# Patient Record
Sex: FEMALE | Race: WHITE | NOT HISPANIC OR LATINO | Employment: FULL TIME | URBAN - METROPOLITAN AREA
[De-identification: names, ages, dates, MRNs, and addresses within clinical notes are randomized per-mention and may not be internally consistent; named-entity substitution may affect disease eponyms.]

---

## 2018-07-20 ENCOUNTER — OFFICE VISIT (OUTPATIENT)
Dept: CARDIOLOGY | Facility: CLINIC | Age: 33
End: 2018-07-20
Payer: COMMERCIAL

## 2018-07-20 ENCOUNTER — OFFICE VISIT (OUTPATIENT)
Dept: ENDOCRINOLOGY | Facility: CLINIC | Age: 33
End: 2018-07-20
Payer: COMMERCIAL

## 2018-07-20 VITALS
DIASTOLIC BLOOD PRESSURE: 88 MMHG | WEIGHT: 172.63 LBS | HEART RATE: 69 BPM | BODY MASS INDEX: 30.59 KG/M2 | SYSTOLIC BLOOD PRESSURE: 128 MMHG | HEIGHT: 63 IN

## 2018-07-20 VITALS
HEIGHT: 64 IN | WEIGHT: 172.63 LBS | DIASTOLIC BLOOD PRESSURE: 88 MMHG | BODY MASS INDEX: 29.47 KG/M2 | HEART RATE: 70 BPM | SYSTOLIC BLOOD PRESSURE: 126 MMHG

## 2018-07-20 DIAGNOSIS — L70.9 ACNE, UNSPECIFIED ACNE TYPE: ICD-10-CM

## 2018-07-20 DIAGNOSIS — E28.2 PCOS (POLYCYSTIC OVARIAN SYNDROME): Primary | ICD-10-CM

## 2018-07-20 DIAGNOSIS — R00.2 FLUTTERING SENSATION OF HEART: ICD-10-CM

## 2018-07-20 DIAGNOSIS — R07.9 CHEST PAIN, UNSPECIFIED TYPE: ICD-10-CM

## 2018-07-20 DIAGNOSIS — L68.0 HIRSUTISM: ICD-10-CM

## 2018-07-20 DIAGNOSIS — I49.49 PREMATURE BEATS: Primary | ICD-10-CM

## 2018-07-20 PROCEDURE — 99204 OFFICE O/P NEW MOD 45 MIN: CPT | Mod: S$GLB,,, | Performed by: INTERNAL MEDICINE

## 2018-07-20 PROCEDURE — 3008F BODY MASS INDEX DOCD: CPT | Mod: CPTII,S$GLB,, | Performed by: INTERNAL MEDICINE

## 2018-07-20 PROCEDURE — 99999 PR PBB SHADOW E&M-EST. PATIENT-LVL III: CPT | Mod: PBBFAC,,, | Performed by: INTERNAL MEDICINE

## 2018-07-20 PROCEDURE — 93000 ELECTROCARDIOGRAM COMPLETE: CPT | Mod: S$GLB,,, | Performed by: INTERNAL MEDICINE

## 2018-07-20 NOTE — LETTER
July 29, 2018      PIERO Dodson Jr., MD  [address]  [facility] [address]           [facility]  [address] [facility] [address] [place] [address]  Phone: [phone]  Fax: [phone]          Patient: Lacey Jenkins Mondor   MR Number: [id number]   YOB: 1985   Date of Visit: 7/20/2018       Dear Dr. PIERO Dodson Jr.:    Thank you for referring Lacey Mondor to me for evaluation. Attached you will find relevant portions of my assessment and plan of care.    If you have questions, please do not hesitate to call me. I look forward to following Lacey Mondor along with you.    Sincerely,    Messi Dickson, DO [employer]  CC:  No Recipients    If you would like to receive this communication electronically, please contact [email or web address] or [phone] to request more information on [employer] access.    For providers and/or their staff who would like to refer a patient to [facility], please contact us through our one-stop-shop provider referral line, [facility], at [phone].    If you feel you have received this communication in error or would no longer like to receive these types of communications, please e-mail [email or web address]

## 2018-07-20 NOTE — PROGRESS NOTES
CHIEF COMPLAINT: PCOS  33 year old being seen as a new patient. Diagnosed with PCOS in teens but does not recall age. Has cystic acne. She delivered 9 months ago and still breast feeding. No menstrual cycle. Has not seen derm for acne. Has hirsutism on chin- tweezes. No history of metformin. She is seeing OB.       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 Possible hypothyroidism. + Dm 2.     MEDICATIONS/ALLERGIES: The patient's MedCard has been updated and reviewed.      ROS:   Constitutional: No recent significant weight change  Eyes: No recent visual changes  ENT: No dysphagia  Cardiovascular: Denies current anginal symptoms  Respiratory: Denies current respiratory difficulty  Gastrointestinal: Denies recent bowel disturbances  GenitoUrinary - No dysuria  Skin: No new skin rash  Neurologic: No focal neurologic complaints  Remainder ROS negative        PE:    GENERAL: Well developed, well nourished.  PSYCH:  appropriate mood and affect  EYES:  PERRL, EOM intact.  ENT: Nares patent, oropharynx clear, mucosa pink,   NECK: Supple, trachea midline, no palpable thyroid nodules.   CHEST: Resp even and unlabored, CTA bilateral.  CARDIAC: RRR, S1, S2 heard, no murmurs, rubs, S3, or S4  ABDOMEN: Soft, non-tender, non-distended;  No organomegaly  VASCULAR:  DP pulses +2/4 bilaterally, no edema  NEURO: Gait steady, CN II-VII grossly intact  SKIN: No areas of breakdown, no acanthosis nigracans.    LABS     ASSESSMENT/PLAN:  1. PCOS  2. Hirsutism  3. Acne    PLAN:  Will do w/u as seen below. If fertility a consideration in the near future can try metformin depending on the insulin levels. Currently breast feeding.       FOLLOWUP  Fasting and 8 AM- Insulin. CMP, 17 hydroprogesterone. Testosterone, DHEAS, ACTH, Cortisol, TSH

## 2018-07-20 NOTE — PROGRESS NOTES
Subjective:    Patient ID:  Lacey Jenkins Mondor is a 33 y.o. female who presents for evaluation of Palpitations      Pt has been experiencing a fluttering in her chest which is described as a single beat and will recur throughout the day. It started during her last pregnancy. Occasionally she will feel like her heart races for a few seconds. She denies any dizziness, syncope or pre-syncope. She denies any sustained rhythms. She denies any chest pain, SOB, PND, orthopnea.        Review of Systems   Constitution: Negative for weight gain and weight loss.   HENT: Negative.    Eyes: Negative.    Cardiovascular: Positive for irregular heartbeat and palpitations. Negative for chest pain, claudication, cyanosis, dyspnea on exertion, leg swelling, near-syncope, orthopnea (no PND) and syncope.   Respiratory: Negative for cough, hemoptysis, shortness of breath and snoring.    Endocrine: Negative.    Skin: Negative.    Musculoskeletal: Negative for joint pain, muscle cramps, muscle weakness and myalgias.   Gastrointestinal: Negative for diarrhea, hematemesis, nausea and vomiting.   Genitourinary: Negative.    Neurological: Negative for dizziness, focal weakness, light-headedness, loss of balance, numbness, paresthesias and seizures.   Psychiatric/Behavioral: Negative.         Objective:    Physical Exam   Constitutional: She is oriented to person, place, and time. She appears well-developed and well-nourished.   Eyes: Pupils are equal, round, and reactive to light.   Neck: Normal range of motion. No thyromegaly present.   Cardiovascular: Normal rate, regular rhythm, S1 normal, S2 normal, normal heart sounds, intact distal pulses and normal pulses.   No extrasystoles are present. PMI is not displaced.  Exam reveals no friction rub.    No murmur heard.  Pulmonary/Chest: Effort normal and breath sounds normal. She has no wheezes. She has no rales. She exhibits no tenderness.   Abdominal: Soft. Bowel sounds are normal. She  exhibits no distension and no mass. There is no tenderness.   Musculoskeletal: Normal range of motion. She exhibits no edema.   Neurological: She is alert and oriented to person, place, and time.   Skin: Skin is warm and dry.   Vitals reviewed.      ECG - normal  Assessment:       1. Premature beats    2. Fluttering sensation of heart         Plan:       Spent 20 min discussing benign nature of the palpitations and PAC/PVC's, as well as the causes of increased frequency including stress , caffine, etc.  Pt was reassured.  Holter to document rhythm  We discussed possible B-blocker if the symptoms worsen or too distracting. She will think about it

## 2018-07-21 ENCOUNTER — LAB VISIT (OUTPATIENT)
Dept: LAB | Facility: HOSPITAL | Age: 33
End: 2018-07-21
Attending: INTERNAL MEDICINE
Payer: COMMERCIAL

## 2018-07-21 DIAGNOSIS — E28.2 PCOS (POLYCYSTIC OVARIAN SYNDROME): ICD-10-CM

## 2018-07-21 LAB
ALBUMIN SERPL BCP-MCNC: 4.1 G/DL
ALP SERPL-CCNC: 84 U/L
ALT SERPL W/O P-5'-P-CCNC: 13 U/L
ANION GAP SERPL CALC-SCNC: 8 MMOL/L
AST SERPL-CCNC: 15 U/L
BILIRUB SERPL-MCNC: 0.3 MG/DL
BUN SERPL-MCNC: 19 MG/DL
CALCIUM SERPL-MCNC: 9.4 MG/DL
CHLORIDE SERPL-SCNC: 109 MMOL/L
CO2 SERPL-SCNC: 23 MMOL/L
CORTIS SERPL-MCNC: 10.9 UG/DL
CREAT SERPL-MCNC: 0.7 MG/DL
DHEA-S SERPL-MCNC: 164 UG/DL
EST. GFR  (AFRICAN AMERICAN): >60 ML/MIN/1.73 M^2
EST. GFR  (NON AFRICAN AMERICAN): >60 ML/MIN/1.73 M^2
GLUCOSE SERPL-MCNC: 94 MG/DL
INSULIN COLLECTION INTERVAL: 3
INSULIN SERPL-ACNC: 10.9 UU/ML
POTASSIUM SERPL-SCNC: 4.4 MMOL/L
PROT SERPL-MCNC: 7.3 G/DL
SODIUM SERPL-SCNC: 140 MMOL/L
TESTOST SERPL-MCNC: 39 NG/DL
TSH SERPL DL<=0.005 MIU/L-ACNC: 1.64 UIU/ML

## 2018-07-21 PROCEDURE — 82024 ASSAY OF ACTH: CPT

## 2018-07-21 PROCEDURE — 83498 ASY HYDROXYPROGESTERONE 17-D: CPT

## 2018-07-21 PROCEDURE — 83525 ASSAY OF INSULIN: CPT

## 2018-07-21 PROCEDURE — 82627 DEHYDROEPIANDROSTERONE: CPT

## 2018-07-21 PROCEDURE — 80053 COMPREHEN METABOLIC PANEL: CPT

## 2018-07-21 PROCEDURE — 84403 ASSAY OF TOTAL TESTOSTERONE: CPT

## 2018-07-21 PROCEDURE — 82533 TOTAL CORTISOL: CPT

## 2018-07-21 PROCEDURE — 84443 ASSAY THYROID STIM HORMONE: CPT

## 2018-07-23 ENCOUNTER — OFFICE VISIT (OUTPATIENT)
Dept: FAMILY MEDICINE | Facility: CLINIC | Age: 33
End: 2018-07-23
Payer: COMMERCIAL

## 2018-07-23 VITALS
WEIGHT: 172.19 LBS | HEIGHT: 63 IN | SYSTOLIC BLOOD PRESSURE: 122 MMHG | DIASTOLIC BLOOD PRESSURE: 80 MMHG | HEART RATE: 73 BPM | BODY MASS INDEX: 30.51 KG/M2 | OXYGEN SATURATION: 99 %

## 2018-07-23 DIAGNOSIS — H65.93 OME (OTITIS MEDIA WITH EFFUSION), BILATERAL: Primary | ICD-10-CM

## 2018-07-23 PROCEDURE — 99999 PR PBB SHADOW E&M-EST. PATIENT-LVL III: CPT | Mod: PBBFAC,,, | Performed by: NURSE PRACTITIONER

## 2018-07-23 PROCEDURE — 99203 OFFICE O/P NEW LOW 30 MIN: CPT | Mod: S$GLB,,, | Performed by: NURSE PRACTITIONER

## 2018-07-23 PROCEDURE — 3008F BODY MASS INDEX DOCD: CPT | Mod: CPTII,S$GLB,, | Performed by: NURSE PRACTITIONER

## 2018-07-23 RX ORDER — FLUTICASONE PROPIONATE 50 MCG
1 SPRAY, SUSPENSION (ML) NASAL DAILY
Qty: 16 G | Refills: 0 | Status: SHIPPED | OUTPATIENT
Start: 2018-07-23 | End: 2018-10-05

## 2018-07-23 RX ORDER — NAPROXEN SODIUM 220 MG
220 TABLET ORAL
COMMUNITY
End: 2019-08-19

## 2018-07-23 RX ORDER — AZELASTINE 1 MG/ML
1 SPRAY, METERED NASAL 2 TIMES DAILY
Qty: 30 ML | Refills: 2 | Status: SHIPPED | OUTPATIENT
Start: 2018-07-23 | End: 2019-03-12

## 2018-07-23 RX ORDER — ONDANSETRON HYDROCHLORIDE 8 MG/1
8 TABLET, FILM COATED ORAL EVERY 8 HOURS PRN
Qty: 30 TABLET | Refills: 0 | Status: SHIPPED | OUTPATIENT
Start: 2018-07-23 | End: 2018-08-21

## 2018-07-23 NOTE — PROGRESS NOTES
"Subjective:       Patient ID: Lacey Jenkins Mondor is a 33 y.o. female.    Chief Complaint: Otalgia    There are no preventive care reminders to display for this patient.    Past Medical History:  Past Medical History:  No date: Abnormal thyroid stimulating hormone (TSH) lev*  No date: Depression  No date: Goiter  No date: Ovarian cyst  No date: Pancreatitis  No date: Preeclampsia  Past Surgical History:  No date: DILATION AND CURETTAGE OF UTERUS  No date: ECTOPIC PREGNANCY SURGERY Left      Comment: laparoscopy  No date: Laparoscopic left salpingectomy  Review of patient's allergies indicates:   -- Morphine -- Other (See Comments)    --  "burns like hell when they put it in"   -- Oxycodone-acetaminophen -- Blisters   -- Percocet (oxycodone-acetaminophen) -- Itching  Current Outpatient Prescriptions on File Prior to Visit:  prenat.vits,adan,min-iron-folic Tab, Take 1 tablet by mouth once daily., Disp: , Rfl:     No current facility-administered medications on file prior to visit.     Social History    Marital status:              Spouse name:                       Years of education:                 Number of children:               Occupational History    None on file    Social History Main Topics    Smoking status: Never Smoker                                                                Smokeless tobacco: Never Used                        Alcohol use: No                 Comment: rare social    Drug use: No              Sexual activity: Yes               Partners with: Male       Birth control/protection: None    Other Topics            Concern    None on file    Social History Narrative    ** Merged History Encounter **           Review of patient's family history indicates:  Problem: Colon polyps      Relation: Mother       Age of Onset: (Not Specified)   Problem: Colon cancer      Relation: Maternal Grandmother       Age of Onset: (Not Specified)   Problem: Diabetes      Relation: Paternal Grandmother "       Age of Onset: (Not Specified)   Problem: Heart failure      Relation: Paternal Grandmother       Age of Onset: (Not Specified)             Otalgia    There is pain in both ears. This is a new problem. The current episode started 1 to 4 weeks ago. The problem occurs constantly. The problem has been gradually worsening. There has been no fever. The fever has been present for less than 1 day. The pain is at a severity of 4/10. The pain is moderate. Associated symptoms include drainage, ear discharge, hearing loss, neck pain and a sore throat. Pertinent negatives include no abdominal pain, coughing, diarrhea, headaches, rash, rhinorrhea or vomiting. She has tried NSAIDs, acetaminophen and ear drops for the symptoms. The treatment provided moderate relief. There is no history of a chronic ear infection, hearing loss or a tympanostomy tube.     Review of Systems   Constitutional: Negative for chills and fever.   HENT: Positive for ear discharge, ear pain, hearing loss and sore throat. Negative for rhinorrhea.    Eyes: Negative.    Respiratory: Negative.  Negative for cough.    Cardiovascular: Negative.    Gastrointestinal: Negative.  Negative for abdominal pain, diarrhea and vomiting.   Musculoskeletal: Positive for neck pain.   Skin: Negative for rash.   Neurological: Negative.  Negative for headaches.       Objective:      Physical Exam   Constitutional: She is oriented to person, place, and time. No distress.   HENT:   Right Ear: No tenderness. Tympanic membrane is not erythematous. A middle ear effusion is present.   Left Ear: No tenderness. Tympanic membrane is not erythematous. A middle ear effusion is present.   Cardiovascular: Normal rate.    Pulmonary/Chest: Effort normal.   Abdominal: Soft.   Neurological: She is alert and oriented to person, place, and time.   Skin: She is not diaphoretic.   Vitals reviewed.      Assessment:       1. OME (otitis media with effusion), bilateral        Plan:       1. OME  (otitis media with effusion), bilateral  Patient is breastfeeding. Try flonase and astelin. Continue aleve, follow up if not resolving.   - fluticasone (FLONASE) 50 mcg/actuation nasal spray; 1 spray (50 mcg total) by Each Nare route once daily.  Dispense: 16 g; Refill: 0  - azelastine (ASTELIN) 137 mcg (0.1 %) nasal spray; 1 spray (137 mcg total) by Nasal route 2 (two) times daily.  Dispense: 30 mL; Refill: 2

## 2018-07-24 LAB
17OHP SERPL-MCNC: 122 NG/DL
ACTH PLAS-MCNC: 22 PG/ML

## 2018-07-25 ENCOUNTER — PATIENT MESSAGE (OUTPATIENT)
Dept: ENDOCRINOLOGY | Facility: CLINIC | Age: 33
End: 2018-07-25

## 2018-07-27 ENCOUNTER — TELEPHONE (OUTPATIENT)
Dept: FAMILY MEDICINE | Facility: CLINIC | Age: 33
End: 2018-07-27

## 2018-07-27 ENCOUNTER — PATIENT MESSAGE (OUTPATIENT)
Dept: FAMILY MEDICINE | Facility: CLINIC | Age: 33
End: 2018-07-27

## 2018-07-27 ENCOUNTER — PATIENT MESSAGE (OUTPATIENT)
Dept: ENDOCRINOLOGY | Facility: CLINIC | Age: 33
End: 2018-07-27

## 2018-07-27 DIAGNOSIS — R07.9 CHEST PAIN, UNSPECIFIED TYPE: Primary | ICD-10-CM

## 2018-07-27 DIAGNOSIS — E28.2 PCOS (POLYCYSTIC OVARIAN SYNDROME): Primary | ICD-10-CM

## 2018-07-27 RX ORDER — AMOXICILLIN 875 MG/1
875 TABLET, FILM COATED ORAL EVERY 12 HOURS
Qty: 20 TABLET | Refills: 0 | Status: SHIPPED | OUTPATIENT
Start: 2018-07-27 | End: 2018-11-26

## 2018-07-27 RX ORDER — METFORMIN HYDROCHLORIDE 500 MG/1
500 TABLET, EXTENDED RELEASE ORAL 2 TIMES DAILY WITH MEALS
Qty: 180 TABLET | Refills: 3 | Status: SHIPPED | OUTPATIENT
Start: 2018-07-27 | End: 2019-10-16

## 2018-07-27 NOTE — TELEPHONE ENCOUNTER
I have responded on my chart, but patients do need to be aware that we have 24 hours to respond to messages, if it is urgent then there is a dr on call or an appointment can be scheduled with an available practitioner or PCC.

## 2018-07-27 NOTE — TELEPHONE ENCOUNTER
----- Message from Janna Roberts MA sent at 7/27/2018  2:33 PM CDT -----  Contact: moni   Still holding for call back, or message,   No voice,   [phone]

## 2018-08-03 ENCOUNTER — CLINICAL SUPPORT (OUTPATIENT)
Dept: CARDIOLOGY | Facility: CLINIC | Age: 33
End: 2018-08-03
Attending: INTERNAL MEDICINE
Payer: COMMERCIAL

## 2018-08-03 DIAGNOSIS — I49.49 PREMATURE BEATS: ICD-10-CM

## 2018-08-03 DIAGNOSIS — R00.2 FLUTTERING SENSATION OF HEART: ICD-10-CM

## 2018-08-03 PROCEDURE — 93224 XTRNL ECG REC UP TO 48 HRS: CPT | Mod: S$GLB,,, | Performed by: INTERNAL MEDICINE

## 2018-08-09 LAB
OHS CV HOLTER LENGTH DECIMAL HOURS: 47.98
OHS CV HOLTER LENGTH HOURS: 47
OHS CV HOLTER LENGTH MINUTES: 59

## 2018-08-10 ENCOUNTER — PATIENT MESSAGE (OUTPATIENT)
Dept: ADMINISTRATIVE | Facility: OTHER | Age: 33
End: 2018-08-10

## 2018-08-10 ENCOUNTER — TELEPHONE (OUTPATIENT)
Dept: CARDIOLOGY | Facility: CLINIC | Age: 33
End: 2018-08-10

## 2018-08-10 NOTE — TELEPHONE ENCOUNTER
Test(s) Reviewed  I have reviewed the following in detail:  [] Stress test   [] Angiography   [] Echocardiogram   [] Labs   [x] Other:  Holter     Call Pt and tell her tests are ok  Occasional premature beats  Do not feel further w/u needed at this time. If symptoms persist, worsen or change Pt encouraged to return for further tests and discuss possible additional meds

## 2018-08-22 RX ORDER — ONDANSETRON HYDROCHLORIDE 8 MG/1
8 TABLET, FILM COATED ORAL EVERY 8 HOURS PRN
Qty: 30 TABLET | Refills: 0 | Status: SHIPPED | OUTPATIENT
Start: 2018-08-22 | End: 2018-10-28

## 2018-09-18 ENCOUNTER — TELEPHONE (OUTPATIENT)
Dept: ENDOCRINOLOGY | Facility: CLINIC | Age: 33
End: 2018-09-18

## 2018-09-18 NOTE — TELEPHONE ENCOUNTER
Dr. Dickson, pt is due to get 3 mo labs (CMP, insulin) in a week or so.  She thinks she could be pregnant and wants to know if she should still get the labs done or if the plan will change.  Please advise.

## 2018-10-05 DIAGNOSIS — H65.93 OME (OTITIS MEDIA WITH EFFUSION), BILATERAL: ICD-10-CM

## 2018-10-05 RX ORDER — FLUTICASONE PROPIONATE 50 MCG
1 SPRAY, SUSPENSION (ML) NASAL DAILY
Qty: 16 G | Refills: 0 | Status: SHIPPED | OUTPATIENT
Start: 2018-10-05 | End: 2018-12-05

## 2018-10-28 RX ORDER — ONDANSETRON HYDROCHLORIDE 8 MG/1
8 TABLET, FILM COATED ORAL EVERY 8 HOURS PRN
Qty: 30 TABLET | Refills: 0 | Status: CANCELLED | OUTPATIENT
Start: 2018-10-28

## 2018-11-02 RX ORDER — ONDANSETRON HYDROCHLORIDE 8 MG/1
8 TABLET, FILM COATED ORAL EVERY 8 HOURS PRN
Qty: 30 TABLET | Refills: 0 | Status: CANCELLED | OUTPATIENT
Start: 2018-10-28

## 2018-11-02 RX ORDER — ONDANSETRON HYDROCHLORIDE 8 MG/1
8 TABLET, FILM COATED ORAL EVERY 8 HOURS PRN
Qty: 30 TABLET | Refills: 0 | Status: SHIPPED | OUTPATIENT
Start: 2018-11-02 | End: 2018-12-05 | Stop reason: SDUPTHER

## 2018-11-26 ENCOUNTER — OFFICE VISIT (OUTPATIENT)
Dept: FAMILY MEDICINE | Facility: CLINIC | Age: 33
End: 2018-11-26
Payer: COMMERCIAL

## 2018-11-26 VITALS
DIASTOLIC BLOOD PRESSURE: 76 MMHG | TEMPERATURE: 99 F | SYSTOLIC BLOOD PRESSURE: 111 MMHG | OXYGEN SATURATION: 98 % | WEIGHT: 166.88 LBS | HEIGHT: 63 IN | HEART RATE: 86 BPM | BODY MASS INDEX: 29.57 KG/M2

## 2018-11-26 DIAGNOSIS — R11.10 VOMITING, INTRACTABILITY OF VOMITING NOT SPECIFIED, PRESENCE OF NAUSEA NOT SPECIFIED, UNSPECIFIED VOMITING TYPE: ICD-10-CM

## 2018-11-26 DIAGNOSIS — R50.9 FEVER, UNSPECIFIED FEVER CAUSE: ICD-10-CM

## 2018-11-26 DIAGNOSIS — J02.9 SORE THROAT: ICD-10-CM

## 2018-11-26 DIAGNOSIS — H66.002 ACUTE SUPPURATIVE OTITIS MEDIA OF LEFT EAR WITHOUT SPONTANEOUS RUPTURE OF TYMPANIC MEMBRANE, RECURRENCE NOT SPECIFIED: Primary | ICD-10-CM

## 2018-11-26 DIAGNOSIS — Z3A.13 13 WEEKS GESTATION OF PREGNANCY: ICD-10-CM

## 2018-11-26 DIAGNOSIS — J30.1 SEASONAL ALLERGIC RHINITIS DUE TO POLLEN: ICD-10-CM

## 2018-11-26 PROCEDURE — 99214 OFFICE O/P EST MOD 30 MIN: CPT | Mod: S$GLB,,, | Performed by: NURSE PRACTITIONER

## 2018-11-26 PROCEDURE — 99999 PR PBB SHADOW E&M-EST. PATIENT-LVL IV: CPT | Mod: PBBFAC,,, | Performed by: NURSE PRACTITIONER

## 2018-11-26 PROCEDURE — 3008F BODY MASS INDEX DOCD: CPT | Mod: CPTII,S$GLB,, | Performed by: NURSE PRACTITIONER

## 2018-11-26 RX ORDER — AMOXICILLIN 875 MG/1
875 TABLET, FILM COATED ORAL EVERY 12 HOURS
Qty: 14 TABLET | Refills: 0 | Status: SHIPPED | OUTPATIENT
Start: 2018-11-26 | End: 2019-04-25

## 2018-11-26 NOTE — PROGRESS NOTES
Subjective:       Patient ID: Lacey Jenkins Mondor is a 33 y.o. female.    Chief Complaint: Sore Throat and Cough (cough up mucus~blood )    Here today with sore throat   13 weeks pregnant and + sick contacts at home with hand foot and mouth viral illness   She has been ill since 6 days   This is the first time I have seen her in clinic. She is patient of Dr Dodson       Sore Throat    This is a new problem. The current episode started in the past 7 days (5 days ). The problem has been gradually worsening. There has been no fever. Associated symptoms include congestion, coughing, trouble swallowing and vomiting. Pertinent negatives include no abdominal pain, diarrhea, drooling, ear discharge, ear pain, headaches, hoarse voice, plugged ear sensation, neck pain, shortness of breath, stridor or swollen glands. She has tried NSAIDs for the symptoms.   Cough   This is a new problem. The current episode started in the past 7 days. The problem has been gradually worsening. The problem occurs constantly. The cough is productive of purulent sputum, productive of sputum and productive of bloody sputum. Associated symptoms include a fever, nasal congestion, postnasal drip and a sore throat. Pertinent negatives include no chest pain, chills, ear congestion, ear pain, headaches, heartburn, hemoptysis, myalgias, rash, rhinorrhea, shortness of breath, sweats, weight loss or wheezing. The symptoms are aggravated by lying down. Treatments tried: cough meds      Vitals:    11/26/18 0846   BP: 111/76   Pulse: 86   Temp: 98.8 °F (37.1 °C)     Review of Systems   Constitutional: Positive for fatigue and fever. Negative for chills, diaphoresis and weight loss.   HENT: Positive for congestion, postnasal drip, sore throat and trouble swallowing. Negative for drooling, ear discharge, ear pain, hoarse voice and rhinorrhea.    Eyes: Negative.    Respiratory: Positive for cough. Negative for hemoptysis, shortness of breath, wheezing and  stridor.    Cardiovascular: Negative.  Negative for chest pain.   Gastrointestinal: Positive for vomiting. Negative for abdominal pain, diarrhea, heartburn and nausea.   Endocrine: Negative.    Genitourinary: Negative for dysuria and hematuria.   Musculoskeletal: Negative.  Negative for myalgias and neck pain.   Skin: Negative for color change and rash.   Allergic/Immunologic: Negative.    Neurological: Negative for speech difficulty, numbness and headaches.   Hematological: Negative.    Psychiatric/Behavioral: Negative.        Past Medical History:   Diagnosis Date    Abnormal thyroid stimulating hormone (TSH) level     Depression     Goiter     Ovarian cyst     Pancreatitis     Preeclampsia      Objective:      Physical Exam   Constitutional: She is oriented to person, place, and time. She appears well-developed and well-nourished.   HENT:   Head: Normocephalic and atraumatic.   Right Ear: Hearing, tympanic membrane, external ear and ear canal normal.   Left Ear: Hearing and external ear normal. Tympanic membrane is erythematous. A middle ear effusion is present.   Nose: Mucosal edema and rhinorrhea present. Right sinus exhibits no maxillary sinus tenderness and no frontal sinus tenderness. Left sinus exhibits no maxillary sinus tenderness and no frontal sinus tenderness.   Mouth/Throat: Uvula is midline and mucous membranes are normal. Posterior oropharyngeal erythema present.   Eyes: Conjunctivae and EOM are normal. Pupils are equal, round, and reactive to light.   Neck: Neck supple.   Cardiovascular: Normal rate, regular rhythm, normal heart sounds and intact distal pulses. Exam reveals no friction rub.   No murmur heard.  Pulmonary/Chest: Effort normal and breath sounds normal. No stridor. No respiratory distress. She has no wheezes.   Abdominal: Soft. Bowel sounds are normal.   Musculoskeletal: Normal range of motion.   Neurological: She is alert and oriented to person, place, and time.   Skin: Skin is  warm and dry.   Psychiatric: She has a normal mood and affect. Her behavior is normal. Judgment and thought content normal.   Nursing note and vitals reviewed.      Assessment:       1. Acute suppurative otitis media of left ear without spontaneous rupture of tympanic membrane, recurrence not specified    2. Sore throat    3. Fever, unspecified fever cause    4. Vomiting, intractability of vomiting not specified, presence of nausea not specified, unspecified vomiting type    5. Seasonal allergic rhinitis due to pollen    6. 13 weeks gestation of pregnancy        Plan:       Acute suppurative otitis media of left ear without spontaneous rupture of tympanic membrane, recurrence not specified      amoxicillin (AMOXIL) 875 MG tablet; Take 1 tablet (875 mg total) by mouth every 12 (twelve) hours.  Dispense: 14 tablet; Refill: 0    Sore throat  -     POCT Rapid Strep A    Fever, unspecified fever cause  -     POCT Rapid Strep A- negative     Vomiting, intractability of vomiting not specified, presence of nausea not specified, unspecified vomiting type  Better     Seasonal allergic rhinitis due to pollen  Add flonase       13 weeks pregnancy   Discussed to know what meds she can take   Discussed take antihistamines for throat       FU if not better

## 2018-12-05 DIAGNOSIS — H65.93 OME (OTITIS MEDIA WITH EFFUSION), BILATERAL: ICD-10-CM

## 2018-12-05 RX ORDER — FLUTICASONE PROPIONATE 50 MCG
1 SPRAY, SUSPENSION (ML) NASAL DAILY
Qty: 16 G | Refills: 0 | Status: CANCELLED | OUTPATIENT
Start: 2018-12-05

## 2018-12-06 RX ORDER — FLUTICASONE PROPIONATE 50 MCG
1 SPRAY, SUSPENSION (ML) NASAL DAILY
Qty: 16 G | Refills: 0 | Status: SHIPPED | OUTPATIENT
Start: 2018-12-06 | End: 2019-07-16 | Stop reason: SDUPTHER

## 2019-03-12 DIAGNOSIS — H65.93 OME (OTITIS MEDIA WITH EFFUSION), BILATERAL: ICD-10-CM

## 2019-03-12 RX ORDER — AZELASTINE 1 MG/ML
1 SPRAY, METERED NASAL 2 TIMES DAILY
Qty: 30 ML | Refills: 2 | Status: SHIPPED | OUTPATIENT
Start: 2019-03-12 | End: 2020-04-23

## 2019-03-14 ENCOUNTER — IMMUNIZATION (OUTPATIENT)
Dept: PHARMACY | Facility: CLINIC | Age: 34
End: 2019-03-14
Payer: COMMERCIAL

## 2019-04-25 ENCOUNTER — OFFICE VISIT (OUTPATIENT)
Dept: DERMATOLOGY | Facility: CLINIC | Age: 34
End: 2019-04-25
Payer: COMMERCIAL

## 2019-04-25 VITALS — WEIGHT: 166.88 LBS | BODY MASS INDEX: 29.57 KG/M2 | HEIGHT: 63 IN

## 2019-04-25 DIAGNOSIS — L70.0 ACNE VULGARIS: ICD-10-CM

## 2019-04-25 DIAGNOSIS — L30.9 ECZEMA, UNSPECIFIED TYPE: Primary | ICD-10-CM

## 2019-04-25 DIAGNOSIS — D22.9 BENIGN NEVUS: ICD-10-CM

## 2019-04-25 PROCEDURE — 99999 PR PBB SHADOW E&M-EST. PATIENT-LVL II: ICD-10-PCS | Mod: PBBFAC,,, | Performed by: DERMATOLOGY

## 2019-04-25 PROCEDURE — 3008F BODY MASS INDEX DOCD: CPT | Mod: CPTII,S$GLB,, | Performed by: DERMATOLOGY

## 2019-04-25 PROCEDURE — 99202 PR OFFICE/OUTPT VISIT, NEW, LEVL II, 15-29 MIN: ICD-10-PCS | Mod: 25,S$GLB,, | Performed by: DERMATOLOGY

## 2019-04-25 PROCEDURE — 99999 PR PBB SHADOW E&M-EST. PATIENT-LVL II: CPT | Mod: PBBFAC,,, | Performed by: DERMATOLOGY

## 2019-04-25 PROCEDURE — 11900 PR INJECTION INTO SKIN LESIONS, UP TO 7: ICD-10-PCS | Mod: S$GLB,,, | Performed by: DERMATOLOGY

## 2019-04-25 PROCEDURE — 11900 INJECT SKIN LESIONS </W 7: CPT | Mod: S$GLB,,, | Performed by: DERMATOLOGY

## 2019-04-25 PROCEDURE — 99202 OFFICE O/P NEW SF 15 MIN: CPT | Mod: 25,S$GLB,, | Performed by: DERMATOLOGY

## 2019-04-25 PROCEDURE — 3008F PR BODY MASS INDEX (BMI) DOCUMENTED: ICD-10-PCS | Mod: CPTII,S$GLB,, | Performed by: DERMATOLOGY

## 2019-04-25 RX ORDER — TRIAMCINOLONE ACETONIDE 1 MG/G
CREAM TOPICAL 2 TIMES DAILY
Qty: 453.6 G | Refills: 1 | Status: SHIPPED | OUTPATIENT
Start: 2019-04-25

## 2019-04-25 NOTE — PROGRESS NOTES
Subjective:       Patient ID:  Lacey Jenkins Mondor is a 33 y.o. female who presents for   Chief Complaint   Patient presents with    Lesion     32 y/o F presents for initial visit for lesion on left cheek, inflamed x 2 weeks.  It has been present for a long time (there was a mole there originally that was scratched) She put make up on top of it and the next week it got inflamed and she started to pick at it and it continued to get worse. Tx with Bactroban.    No h/o skin cancer. Denies family h/o melanoma.    Social Hx: pharmacy tech at [employer]  .  Past Medical History:   Diagnosis Date    Abnormal thyroid stimulating hormone (TSH) level     Depression     Goiter     Ovarian cyst     Pancreatitis     Preeclampsia      Review of Systems   Skin: Positive for itching. Negative for daily sunscreen use, activity-related sunscreen use and wears hat.        Objective:    Physical Exam   Constitutional: She appears well-developed and well-nourished.   HENT:   Mouth/Throat: Lips normal.    Eyes: Lids are normal.    Neurological: She is alert and oriented to person, place, and time.   Psychiatric: She has a normal mood and affect.   Skin:   Areas Examined (abnormalities noted in diagram):   Head / Face Inspection Performed  Neck Inspection Performed  Chest / Axilla Inspection Performed                  Diagram Legend     Erythematous scaling macule/papule c/w actinic keratosis       Vascular papule c/w angioma      Pigmented verrucoid papule/plaque c/w seborrheic keratosis      Yellow umbilicated papule c/w sebaceous hyperplasia      Irregularly shaped tan macule c/w lentigo     1-2 mm smooth white papules consistent with Milia      Movable subcutaneous cyst with punctum c/w epidermal inclusion cyst      Subcutaneous movable cyst c/w pilar cyst      Firm pink to brown papule c/w dermatofibroma      Pedunculated fleshy papule(s) c/w skin tag(s)      Evenly pigmented macule c/w junctional nevus     Mildly  variegated pigmented, slightly irregular-bordered macule c/w mildly atypical nevus      Flesh colored to evenly pigmented papule c/w intradermal nevus       Pink pearly papule/plaque c/w basal cell carcinoma      Erythematous hyperkeratotic cursted plaque c/w SCC      Surgical scar with no sign of skin cancer recurrence      Open and closed comedones      Inflammatory papules and pustules      Verrucoid papule consistent consistent with wart     Erythematous eczematous patches and plaques     Dystrophic onycholytic nail with subungual debris c/w onychomycosis     Umbilicated papule    Erythematous-base heme-crusted tan verrucoid plaque consistent with inflamed seborrheic keratosis     Erythematous Silvery Scaling Plaque c/w Psoriasis     See annotation      Assessment / Plan:        Eczema, unspecified type  Minimize handwashing when possible  -     triamcinolone acetonide 0.1% (KENALOG) 0.1 % cream; Apply topically 2 (two) times daily. hands  Dispense: 453.6 g; Refill: 1    Benign nevus  Pt would like to have this mole removed when the inflammation improves/resolves    Acne vulgaris/Cyst  I believe there is an acne cyst below this nevus  I encouraged her to avoid picking, scratching    Intralesional Kenalog 2.5 mg/cc (0.15 cc total) injected into 1 lesion on the L cheek today after obtaining verbal consent including risk of surrounding hypopigmentation. Patient tolerated procedure well.    Units: 1  NDC for Kenalog 10mg/cc:  6655-1613-81         Follow up for pt will call for procedure appt for mole removal.

## 2019-05-12 ENCOUNTER — PATIENT MESSAGE (OUTPATIENT)
Dept: SURGERY | Facility: CLINIC | Age: 34
End: 2019-05-12

## 2019-05-14 ENCOUNTER — OFFICE VISIT (OUTPATIENT)
Dept: SURGERY | Facility: CLINIC | Age: 34
End: 2019-05-14
Payer: COMMERCIAL

## 2019-05-14 VITALS
TEMPERATURE: 98 F | BODY MASS INDEX: 29.71 KG/M2 | SYSTOLIC BLOOD PRESSURE: 118 MMHG | WEIGHT: 174 LBS | HEIGHT: 64 IN | DIASTOLIC BLOOD PRESSURE: 90 MMHG | HEART RATE: 94 BPM

## 2019-05-14 DIAGNOSIS — K64.5 THROMBOSED EXTERNAL HEMORRHOIDS: Primary | ICD-10-CM

## 2019-05-14 PROCEDURE — 99203 PR OFFICE/OUTPT VISIT, NEW, LEVL III, 30-44 MIN: ICD-10-PCS | Mod: 25,S$GLB,, | Performed by: SURGERY

## 2019-05-14 PROCEDURE — 99203 OFFICE O/P NEW LOW 30 MIN: CPT | Mod: 25,S$GLB,, | Performed by: SURGERY

## 2019-05-14 PROCEDURE — 46320 REMOVAL OF HEMORRHOID CLOT: CPT | Mod: S$GLB,,, | Performed by: SURGERY

## 2019-05-14 PROCEDURE — 99999 PR PBB SHADOW E&M-EST. PATIENT-LVL III: ICD-10-PCS | Mod: PBBFAC,,, | Performed by: SURGERY

## 2019-05-14 PROCEDURE — 99999 PR PBB SHADOW E&M-EST. PATIENT-LVL III: CPT | Mod: PBBFAC,,, | Performed by: SURGERY

## 2019-05-14 PROCEDURE — 3008F BODY MASS INDEX DOCD: CPT | Mod: CPTII,S$GLB,, | Performed by: SURGERY

## 2019-05-14 PROCEDURE — 46320 PR EXCISION THROMBOSED HEMORRHOID, EXTERNAL: ICD-10-PCS | Mod: S$GLB,,, | Performed by: SURGERY

## 2019-05-14 PROCEDURE — 3008F PR BODY MASS INDEX (BMI) DOCUMENTED: ICD-10-PCS | Mod: CPTII,S$GLB,, | Performed by: SURGERY

## 2019-05-14 NOTE — PROGRESS NOTES
Subjective:       Patient ID: Lacey Jenkins Mondor is a 34 y.o. female.    Chief Complaint: Hemorrhoids (Thrombosed hemorrhoid)    HPI  Pleasant 33 yo F presents for evaluation of her hemorrhoids. Pt notes that she is 36 weeks pregnant and states that 4 days ago developed intense pain and discomfort in the perianal area.  She has had swelling and pain in the area.  Notes a firm hard mass.  Was seen in local ER and told that she had thrombosed hemorrhoids.  Denies bleeding.  No fever/chills.  Pt has been doing sitz baths and hemorrhoid cream.  She is otherwise healthy  Review of Systems   Constitutional: Negative for activity change, appetite change, fever and unexpected weight change.   Respiratory: Negative for chest tightness, shortness of breath and wheezing.    Cardiovascular: Negative for chest pain.   Gastrointestinal: Positive for rectal pain. Negative for abdominal distention, abdominal pain, anal bleeding, blood in stool, constipation, diarrhea, nausea and vomiting.   Genitourinary: Negative for difficulty urinating, dysuria and frequency.   Skin: Negative for color change and wound.   Neurological: Negative for dizziness.   Hematological: Negative for adenopathy.   Psychiatric/Behavioral: Negative for agitation and decreased concentration.       Objective:      Physical Exam   Constitutional: She is oriented to person, place, and time. She appears well-developed and well-nourished.   HENT:   Head: Normocephalic and atraumatic.   Eyes: Pupils are equal, round, and reactive to light.   Neck: Normal range of motion. Neck supple. No tracheal deviation present. No thyromegaly present.   Cardiovascular: Normal rate, regular rhythm and normal heart sounds.   No murmur heard.  Pulmonary/Chest: Effort normal and breath sounds normal. She exhibits no tenderness.   Abdominal: Soft. Bowel sounds are normal. She exhibits no distension, no abdominal bruit, no pulsatile midline mass and no mass. There is no  hepatosplenomegaly. There is no tenderness. There is no rigidity, no rebound, no guarding, no tenderness at McBurney's point and negative Bowman's sign. Hernia confirmed negative in the ventral area.   pregnant   Genitourinary: Rectum normal.   Genitourinary Comments: Pt placed in L lateral position.  ON ext examination there is an enlarged thrombosed ext hemorrhoid in the L lateral position with both thrombosis and inflammatory swelling present.     Musculoskeletal: Normal range of motion.   Lymphadenopathy:     She has no cervical adenopathy.   Neurological: She is alert and oriented to person, place, and time.   Skin: Skin is warm. No rash noted. No erythema.   Psychiatric: She has a normal mood and affect.   Vitals reviewed.      Assessment:    Thrombosed ext hemorrhoid  No diagnosis found.    Plan:       D/w pt.  I have offered excision of the thrombosed hemorrhoid in office today vs continued observation.  Pt desired excision    Following signing of informed consent pt was placed in L lateral position.  Area was infiltrated with 5 cc's of 2% lidocaine with.  I then cut an ellipse of skin overlying the hemorrhoid in the L lateral  position with Metzenbaum scissors.  Clot was sharply dissected and removed without event.  Wound was irrigated and left open.  Pressure dressing applied.  Pt tolerated the procedure well.  She will RTC in 4 weeks

## 2019-05-15 ENCOUNTER — PATIENT MESSAGE (OUTPATIENT)
Dept: SURGERY | Facility: CLINIC | Age: 34
End: 2019-05-15

## 2019-06-18 ENCOUNTER — PATIENT MESSAGE (OUTPATIENT)
Dept: ENDOCRINOLOGY | Facility: CLINIC | Age: 34
End: 2019-06-18

## 2019-06-27 ENCOUNTER — OFFICE VISIT (OUTPATIENT)
Dept: FAMILY MEDICINE | Facility: CLINIC | Age: 34
End: 2019-06-27
Payer: COMMERCIAL

## 2019-06-27 VITALS
BODY MASS INDEX: 26.42 KG/M2 | SYSTOLIC BLOOD PRESSURE: 118 MMHG | HEART RATE: 72 BPM | DIASTOLIC BLOOD PRESSURE: 86 MMHG | TEMPERATURE: 98 F | OXYGEN SATURATION: 97 % | HEIGHT: 64 IN | WEIGHT: 154.75 LBS

## 2019-06-27 DIAGNOSIS — H69.93 EUSTACHIAN TUBE DYSFUNCTION, BILATERAL: Primary | ICD-10-CM

## 2019-06-27 DIAGNOSIS — J02.9 SORE THROAT: ICD-10-CM

## 2019-06-27 PROCEDURE — 99999 PR PBB SHADOW E&M-EST. PATIENT-LVL IV: ICD-10-PCS | Mod: PBBFAC,,, | Performed by: NURSE PRACTITIONER

## 2019-06-27 PROCEDURE — 3008F BODY MASS INDEX DOCD: CPT | Mod: CPTII,S$GLB,, | Performed by: NURSE PRACTITIONER

## 2019-06-27 PROCEDURE — 99999 PR PBB SHADOW E&M-EST. PATIENT-LVL IV: CPT | Mod: PBBFAC,,, | Performed by: NURSE PRACTITIONER

## 2019-06-27 PROCEDURE — 3008F PR BODY MASS INDEX (BMI) DOCUMENTED: ICD-10-PCS | Mod: CPTII,S$GLB,, | Performed by: NURSE PRACTITIONER

## 2019-06-27 PROCEDURE — 99213 OFFICE O/P EST LOW 20 MIN: CPT | Mod: S$GLB,,, | Performed by: NURSE PRACTITIONER

## 2019-06-27 PROCEDURE — 99213 PR OFFICE/OUTPT VISIT, EST, LEVL III, 20-29 MIN: ICD-10-PCS | Mod: S$GLB,,, | Performed by: NURSE PRACTITIONER

## 2019-06-27 RX ORDER — TRIAMCINOLONE ACETONIDE 55 UG/1
2 SPRAY, METERED NASAL DAILY
Qty: 16.9 ML | Refills: 6 | Status: SHIPPED | OUTPATIENT
Start: 2019-06-27 | End: 2020-10-06

## 2019-06-27 NOTE — PROGRESS NOTES
Subjective:       Patient ID: Lacey Jenkins Mondor is a 34 y.o. female.    Chief Complaint: Otitis Media (started 2 weeks ago)    Otalgia    There is pain in both ears. This is a recurrent problem. The current episode started 1 to 4 weeks ago. The problem occurs hourly. The problem has been waxing and waning. There has been no fever. The pain is at a severity of 4/10. The pain is moderate. Associated symptoms include headaches, neck pain and a sore throat. Pertinent negatives include no abdominal pain, coughing, diarrhea, drainage, ear discharge, hearing loss, rash, rhinorrhea or vomiting. She has tried NSAIDs and acetaminophen for the symptoms. The treatment provided moderate relief. Her past medical history is significant for a chronic ear infection. There is no history of hearing loss or a tympanostomy tube.     Vitals:    06/27/19 0955   BP: 118/86   Pulse: 72   Temp: 98.2 °F (36.8 °C)     Review of Systems   Constitutional: Negative.  Negative for diaphoresis, fatigue and fever.   HENT: Positive for ear pain and sore throat. Negative for ear discharge, hearing loss and rhinorrhea.    Eyes: Negative.    Respiratory: Negative.  Negative for cough, shortness of breath and wheezing.    Cardiovascular: Negative.  Negative for chest pain.   Gastrointestinal: Negative for abdominal pain, diarrhea, nausea and vomiting.   Endocrine: Negative.    Genitourinary: Negative.  Negative for dysuria and hematuria.   Musculoskeletal: Positive for neck pain.   Skin: Negative for color change and rash.   Allergic/Immunologic: Negative.    Neurological: Positive for headaches. Negative for speech difficulty and numbness.   Hematological: Negative.    Psychiatric/Behavioral: Negative.        Past Medical History:   Diagnosis Date    Abnormal thyroid stimulating hormone (TSH) level     Depression     Goiter     Ovarian cyst     Pancreatitis     Preeclampsia        Objective:      Physical Exam   Constitutional: She is oriented  to person, place, and time. She appears well-developed and well-nourished.   HENT:   Head: Normocephalic and atraumatic.   Right Ear: Hearing, tympanic membrane, external ear and ear canal normal.   Left Ear: Hearing, external ear and ear canal normal. A middle ear effusion is present.   Nose: Nose normal. No mucosal edema, rhinorrhea or sinus tenderness. Right sinus exhibits no maxillary sinus tenderness and no frontal sinus tenderness. Left sinus exhibits no maxillary sinus tenderness and no frontal sinus tenderness.   Mouth/Throat: Uvula is midline and mucous membranes are normal. Posterior oropharyngeal erythema present. No oropharyngeal exudate or posterior oropharyngeal edema.   Eyes: Pupils are equal, round, and reactive to light. Conjunctivae and EOM are normal.   Neck: Normal range of motion. Neck supple.   Cardiovascular: Normal rate, regular rhythm, normal heart sounds and intact distal pulses. Exam reveals no friction rub.   No murmur heard.  Pulmonary/Chest: Effort normal and breath sounds normal. No stridor. No respiratory distress. She has no wheezes. She has no rales.   Abdominal: Soft. Bowel sounds are normal.   Musculoskeletal: Normal range of motion. She exhibits no edema or tenderness.   Neurological: She is alert and oriented to person, place, and time. No cranial nerve deficit. Coordination normal.   Skin: Skin is warm and dry.   Psychiatric: She has a normal mood and affect. Her behavior is normal. Judgment and thought content normal.   Nursing note and vitals reviewed.      Assessment:       1. Eustachian tube dysfunction, bilateral    2. Sore throat        Plan:       Eustachian tube dysfunction, bilateral  -     triamcinolone (NASACORT) 55 mcg nasal inhaler; Use 2 sprays by Nasal route once daily.  Dispense: 16.9 mL; Refill: 6    Sore throat          Discussed nasacort would help with symptoms  Call if not better

## 2019-07-16 DIAGNOSIS — H65.93 OME (OTITIS MEDIA WITH EFFUSION), BILATERAL: ICD-10-CM

## 2019-07-16 RX ORDER — FLUTICASONE PROPIONATE 50 MCG
1 SPRAY, SUSPENSION (ML) NASAL DAILY
Qty: 16 G | Refills: 0 | Status: CANCELLED | OUTPATIENT
Start: 2019-07-16

## 2019-07-17 DIAGNOSIS — H65.93 OME (OTITIS MEDIA WITH EFFUSION), BILATERAL: ICD-10-CM

## 2019-07-17 RX ORDER — FLUTICASONE PROPIONATE 50 MCG
1 SPRAY, SUSPENSION (ML) NASAL DAILY
Qty: 16 G | Refills: 5 | Status: SHIPPED | OUTPATIENT
Start: 2019-07-17 | End: 2021-10-01

## 2019-08-05 ENCOUNTER — PATIENT OUTREACH (OUTPATIENT)
Dept: ADMINISTRATIVE | Facility: HOSPITAL | Age: 34
End: 2019-08-05

## 2019-08-08 ENCOUNTER — TELEPHONE (OUTPATIENT)
Dept: FAMILY MEDICINE | Facility: CLINIC | Age: 34
End: 2019-08-08

## 2019-08-08 ENCOUNTER — PATIENT MESSAGE (OUTPATIENT)
Dept: FAMILY MEDICINE | Facility: CLINIC | Age: 34
End: 2019-08-08

## 2019-08-08 RX ORDER — ACETAMINOPHEN 500 MG
5000 TABLET ORAL DAILY
COMMUNITY
End: 2021-10-01 | Stop reason: ALTCHOICE

## 2019-08-08 RX ORDER — CALCIUM CARBONATE 600 MG
600 TABLET ORAL ONCE
COMMUNITY
End: 2019-08-19

## 2019-08-08 RX ORDER — MULTIVIT WITH MINERALS/HERBS
1 TABLET ORAL DAILY
COMMUNITY
End: 2021-12-06

## 2019-08-08 RX ORDER — ASPIRIN 81 MG
200 TABLET, DELAYED RELEASE (ENTERIC COATED) ORAL DAILY
COMMUNITY
End: 2021-10-01

## 2019-08-08 RX ORDER — DEXLANSOPRAZOLE 60 MG/1
60 CAPSULE, DELAYED RELEASE ORAL EVERY MORNING
COMMUNITY
End: 2019-08-19

## 2019-08-08 RX ORDER — ASCORBIC ACID 250 MG
250 TABLET ORAL DAILY
COMMUNITY
End: 2021-12-06

## 2019-08-19 ENCOUNTER — OFFICE VISIT (OUTPATIENT)
Dept: FAMILY MEDICINE | Facility: CLINIC | Age: 34
End: 2019-08-19
Payer: COMMERCIAL

## 2019-08-19 VITALS
SYSTOLIC BLOOD PRESSURE: 122 MMHG | HEART RATE: 75 BPM | OXYGEN SATURATION: 96 % | DIASTOLIC BLOOD PRESSURE: 74 MMHG | TEMPERATURE: 98 F

## 2019-08-19 DIAGNOSIS — R53.83 OTHER FATIGUE: ICD-10-CM

## 2019-08-19 DIAGNOSIS — K21.9 GASTROESOPHAGEAL REFLUX DISEASE WITHOUT ESOPHAGITIS: ICD-10-CM

## 2019-08-19 DIAGNOSIS — E28.2 PCOS (POLYCYSTIC OVARIAN SYNDROME): ICD-10-CM

## 2019-08-19 DIAGNOSIS — Z00.01 ENCOUNTER FOR ROUTINE ADULT HEALTH EXAMINATION WITH ABNORMAL FINDINGS: Primary | ICD-10-CM

## 2019-08-19 DIAGNOSIS — J30.1 SEASONAL ALLERGIC RHINITIS DUE TO POLLEN: ICD-10-CM

## 2019-08-19 PROCEDURE — 90471 HPV VACCINE 9-VALENT 3 DOSE IM: ICD-10-PCS | Mod: S$GLB,,, | Performed by: FAMILY MEDICINE

## 2019-08-19 PROCEDURE — 99395 PR PREVENTIVE VISIT,EST,18-39: ICD-10-PCS | Mod: 25,S$GLB,, | Performed by: FAMILY MEDICINE

## 2019-08-19 PROCEDURE — 99999 PR PBB SHADOW E&M-EST. PATIENT-LVL III: CPT | Mod: PBBFAC,,, | Performed by: FAMILY MEDICINE

## 2019-08-19 PROCEDURE — 90651 HPV VACCINE 9-VALENT 3 DOSE IM: ICD-10-PCS | Mod: S$GLB,,, | Performed by: FAMILY MEDICINE

## 2019-08-19 PROCEDURE — 99999 PR PBB SHADOW E&M-EST. PATIENT-LVL III: ICD-10-PCS | Mod: PBBFAC,,, | Performed by: FAMILY MEDICINE

## 2019-08-19 PROCEDURE — 90472 IMMUNIZATION ADMIN EACH ADD: CPT | Mod: S$GLB,,, | Performed by: FAMILY MEDICINE

## 2019-08-19 PROCEDURE — 99395 PREV VISIT EST AGE 18-39: CPT | Mod: 25,S$GLB,, | Performed by: FAMILY MEDICINE

## 2019-08-19 PROCEDURE — 90632 HEPATITIS A VACCINE ADULT IM: ICD-10-PCS | Mod: S$GLB,,, | Performed by: FAMILY MEDICINE

## 2019-08-19 PROCEDURE — 90471 IMMUNIZATION ADMIN: CPT | Mod: S$GLB,,, | Performed by: FAMILY MEDICINE

## 2019-08-19 PROCEDURE — 90632 HEPA VACCINE ADULT IM: CPT | Mod: S$GLB,,, | Performed by: FAMILY MEDICINE

## 2019-08-19 PROCEDURE — 90472 HEPATITIS A VACCINE ADULT IM: ICD-10-PCS | Mod: S$GLB,,, | Performed by: FAMILY MEDICINE

## 2019-08-19 PROCEDURE — 90651 9VHPV VACCINE 2/3 DOSE IM: CPT | Mod: S$GLB,,, | Performed by: FAMILY MEDICINE

## 2019-08-19 RX ORDER — PANTOPRAZOLE SODIUM 20 MG/1
20 TABLET, DELAYED RELEASE ORAL DAILY
Qty: 30 TABLET | Refills: 11 | Status: SHIPPED | OUTPATIENT
Start: 2019-08-19 | End: 2020-11-02 | Stop reason: ALTCHOICE

## 2019-08-19 RX ORDER — ESOMEPRAZOLE MAGNESIUM 40 MG/1
40 CAPSULE, DELAYED RELEASE ORAL
Qty: 30 CAPSULE | Refills: 11 | Status: SHIPPED | OUTPATIENT
Start: 2019-08-19 | End: 2019-08-19

## 2019-08-19 NOTE — PROGRESS NOTES
Subjective:       Patient ID: Lacey Jenkins Mondor is a 34 y.o. female.    Chief Complaint: Establish Care, annual checkup    HPI     The patient is coming here today for a annual checkup.  She is breast feeding currently, the patient also is coming here to establish a new primary care physician.  She is asking for Gardasil and hepatitis a vaccinations to be given today.  The patient has as reflux during pregnancy and was prescribed Dexilant 60 mg and also ranitidine at bedtime.  The patient is been taking those medications.  The patient also complains of chronic headaches and takes Tylenol with codeine as needed.  She also takes ibuprofen as needed and Tylenol as needed but do not work as well.  The symptoms are improved since she had the baby.  She denies any symptoms of chest pain, shortness of breath, nausea, vomiting, abdominal pain, complains of fatigue.    Past medical history, past social history was reviewed and discussed with the patient.    Review of Systems   Constitutional: Negative for activity change and unexpected weight change.   HENT: Negative for hearing loss, rhinorrhea and trouble swallowing.    Eyes: Negative for discharge and visual disturbance.   Respiratory: Negative for chest tightness and wheezing.    Cardiovascular: Negative for chest pain and palpitations.   Gastrointestinal: Negative for blood in stool, constipation, diarrhea and vomiting.   Endocrine: Negative for polydipsia and polyuria.   Genitourinary: Negative for difficulty urinating, dysuria, hematuria and menstrual problem.   Musculoskeletal: Negative for arthralgias and joint swelling.   Skin: Negative for color change and pallor.   Neurological: Positive for headaches. Negative for weakness.   Psychiatric/Behavioral: Negative for confusion and dysphoric mood.       Objective:      Physical Exam   Constitutional: She appears well-developed and well-nourished. No distress.   HENT:   Head: Normocephalic and atraumatic.   Right  Ear: External ear normal.   Left Ear: External ear normal.   Mouth/Throat: Oropharynx is clear and moist. No oropharyngeal exudate.   Eyes: Pupils are equal, round, and reactive to light. Conjunctivae are normal. Right eye exhibits no discharge. Left eye exhibits no discharge. No scleral icterus.   Neck: Neck supple. No tracheal deviation present. No thyromegaly present.   Cardiovascular: Normal rate, regular rhythm and normal heart sounds.   No murmur heard.  Pulmonary/Chest: Effort normal and breath sounds normal. No respiratory distress. She has no wheezes.   Abdominal: She exhibits no distension. There is no tenderness.   Musculoskeletal: She exhibits no tenderness or deformity.   Lymphadenopathy:     She has no cervical adenopathy.   Neurological: No cranial nerve deficit. Coordination normal.   Skin: She is not diaphoretic. No erythema. No pallor.   Psychiatric: She has a normal mood and affect. Her behavior is normal. Judgment and thought content normal.   Nursing note and vitals reviewed.      Assessment:       1. Encounter for routine adult health examination with abnormal findings    2. PCOS (polycystic ovarian syndrome)    3. Breast feeding status of mother    4. Seasonal allergic rhinitis due to pollen    5. Other fatigue    6. Gastroesophageal reflux disease without esophagitis        Plan:       Encounter for routine adult health examination with abnormal findings  -     (In Office Administered) HPV Vaccine (9-Valent) (3 Dose) (IM)  -     (In Office Administered) Hepatitis A Vaccine (Adult) (IM)  -     CBC auto differential; Future; Expected date: 08/19/2019  -     Lipid panel; Future; Expected date: 08/19/2019  -     Comprehensive metabolic panel; Future; Expected date: 08/19/2019  -     TSH; Future; Expected date: 08/19/2019    PCOS (polycystic ovarian syndrome):  Stable    Breast feeding status of mother:  Stable    Seasonal allergic rhinitis due to pollen:  Stable    Other fatigue:  New problem,  Workup needed    Gastroesophageal reflux disease without esophagitis:  Improved  -     pantoprazole (PROTONIX) 20 MG tablet; Take 1 tablet (20 mg total) by mouth once daily.  Dispense: 30 tablet; Refill: 11    Will discontinue Dexilant, as the medication has side effect for the infant with breast feeding.  Will start patient on pantoprazole 20 mg instead and will continue with ranitidine at bedtime.  Cannot prescribe Tylenol plus codeine because is contraindicated with lactation.  The patient was advised to take ibuprofen or as needed for pain.  Make sure to drink plenty water if the symptoms persist to let us know.  Continue with the rest of the medications.Patient agreed with assessment and plan. Patient verbalized understanding.

## 2019-08-19 NOTE — PATIENT INSTRUCTIONS

## 2019-08-20 ENCOUNTER — LAB VISIT (OUTPATIENT)
Dept: LAB | Facility: HOSPITAL | Age: 34
End: 2019-08-20
Attending: FAMILY MEDICINE
Payer: COMMERCIAL

## 2019-08-20 DIAGNOSIS — D50.8 OTHER IRON DEFICIENCY ANEMIA: Primary | ICD-10-CM

## 2019-08-20 DIAGNOSIS — Z00.01 ENCOUNTER FOR ROUTINE ADULT HEALTH EXAMINATION WITH ABNORMAL FINDINGS: ICD-10-CM

## 2019-08-20 LAB
ALBUMIN SERPL BCP-MCNC: 4 G/DL (ref 3.5–5.2)
ALP SERPL-CCNC: 78 U/L (ref 55–135)
ALT SERPL W/O P-5'-P-CCNC: 14 U/L (ref 10–44)
ANION GAP SERPL CALC-SCNC: 8 MMOL/L (ref 8–16)
AST SERPL-CCNC: 17 U/L (ref 10–40)
BASOPHILS # BLD AUTO: 0.06 K/UL (ref 0–0.2)
BASOPHILS NFR BLD: 0.7 % (ref 0–1.9)
BILIRUB SERPL-MCNC: 0.3 MG/DL (ref 0.1–1)
BUN SERPL-MCNC: 14 MG/DL (ref 6–20)
CALCIUM SERPL-MCNC: 9.5 MG/DL (ref 8.7–10.5)
CHLORIDE SERPL-SCNC: 107 MMOL/L (ref 95–110)
CHOLEST SERPL-MCNC: 235 MG/DL (ref 120–199)
CHOLEST/HDLC SERPL: 3.1 {RATIO} (ref 2–5)
CO2 SERPL-SCNC: 23 MMOL/L (ref 23–29)
CREAT SERPL-MCNC: 0.7 MG/DL (ref 0.5–1.4)
DIFFERENTIAL METHOD: ABNORMAL
EOSINOPHIL # BLD AUTO: 0.2 K/UL (ref 0–0.5)
EOSINOPHIL NFR BLD: 1.8 % (ref 0–8)
ERYTHROCYTE [DISTWIDTH] IN BLOOD BY AUTOMATED COUNT: 18 % (ref 11.5–14.5)
EST. GFR  (AFRICAN AMERICAN): >60 ML/MIN/1.73 M^2
EST. GFR  (NON AFRICAN AMERICAN): >60 ML/MIN/1.73 M^2
GLUCOSE SERPL-MCNC: 88 MG/DL (ref 70–110)
HCT VFR BLD AUTO: 38.8 % (ref 37–48.5)
HDLC SERPL-MCNC: 77 MG/DL (ref 40–75)
HDLC SERPL: 32.8 % (ref 20–50)
HGB BLD-MCNC: 11.9 G/DL (ref 12–16)
IMM GRANULOCYTES # BLD AUTO: 0.01 K/UL (ref 0–0.04)
IMM GRANULOCYTES NFR BLD AUTO: 0.1 % (ref 0–0.5)
LDLC SERPL CALC-MCNC: 145.2 MG/DL (ref 63–159)
LYMPHOCYTES # BLD AUTO: 3.1 K/UL (ref 1–4.8)
LYMPHOCYTES NFR BLD: 36.9 % (ref 18–48)
MCH RBC QN AUTO: 25.4 PG (ref 27–31)
MCHC RBC AUTO-ENTMCNC: 30.7 G/DL (ref 32–36)
MCV RBC AUTO: 83 FL (ref 82–98)
MONOCYTES # BLD AUTO: 0.4 K/UL (ref 0.3–1)
MONOCYTES NFR BLD: 5 % (ref 4–15)
NEUTROPHILS # BLD AUTO: 4.6 K/UL (ref 1.8–7.7)
NEUTROPHILS NFR BLD: 55.5 % (ref 38–73)
NONHDLC SERPL-MCNC: 158 MG/DL
NRBC BLD-RTO: 0 /100 WBC
PLATELET # BLD AUTO: 378 K/UL (ref 150–350)
PMV BLD AUTO: 10.1 FL (ref 9.2–12.9)
POTASSIUM SERPL-SCNC: 4.4 MMOL/L (ref 3.5–5.1)
PROT SERPL-MCNC: 7.3 G/DL (ref 6–8.4)
RBC # BLD AUTO: 4.68 M/UL (ref 4–5.4)
SODIUM SERPL-SCNC: 138 MMOL/L (ref 136–145)
TRIGL SERPL-MCNC: 64 MG/DL (ref 30–150)
TSH SERPL DL<=0.005 MIU/L-ACNC: 2.31 UIU/ML (ref 0.4–4)
WBC # BLD AUTO: 8.32 K/UL (ref 3.9–12.7)

## 2019-08-20 PROCEDURE — 80061 LIPID PANEL: CPT

## 2019-08-20 PROCEDURE — 80053 COMPREHEN METABOLIC PANEL: CPT

## 2019-08-20 PROCEDURE — 36415 COLL VENOUS BLD VENIPUNCTURE: CPT | Mod: PO

## 2019-08-20 PROCEDURE — 85025 COMPLETE CBC W/AUTO DIFF WBC: CPT

## 2019-08-20 PROCEDURE — 84443 ASSAY THYROID STIM HORMONE: CPT

## 2019-08-21 ENCOUNTER — PATIENT MESSAGE (OUTPATIENT)
Dept: FAMILY MEDICINE | Facility: CLINIC | Age: 34
End: 2019-08-21

## 2019-08-23 ENCOUNTER — PATIENT MESSAGE (OUTPATIENT)
Dept: FAMILY MEDICINE | Facility: CLINIC | Age: 34
End: 2019-08-23

## 2019-09-10 ENCOUNTER — IMMUNIZATION (OUTPATIENT)
Dept: PHARMACY | Facility: CLINIC | Age: 34
End: 2019-09-10
Payer: COMMERCIAL

## 2019-09-27 ENCOUNTER — PATIENT MESSAGE (OUTPATIENT)
Dept: FAMILY MEDICINE | Facility: CLINIC | Age: 34
End: 2019-09-27

## 2019-10-01 ENCOUNTER — LAB VISIT (OUTPATIENT)
Dept: LAB | Facility: HOSPITAL | Age: 34
End: 2019-10-01
Attending: FAMILY MEDICINE
Payer: COMMERCIAL

## 2019-10-01 DIAGNOSIS — D50.8 OTHER IRON DEFICIENCY ANEMIA: ICD-10-CM

## 2019-10-01 LAB
BASOPHILS # BLD AUTO: 0.06 K/UL (ref 0–0.2)
BASOPHILS NFR BLD: 0.7 % (ref 0–1.9)
DIFFERENTIAL METHOD: ABNORMAL
EOSINOPHIL # BLD AUTO: 0.1 K/UL (ref 0–0.5)
EOSINOPHIL NFR BLD: 1.2 % (ref 0–8)
ERYTHROCYTE [DISTWIDTH] IN BLOOD BY AUTOMATED COUNT: 15.4 % (ref 11.5–14.5)
FERRITIN SERPL-MCNC: 36 NG/ML (ref 20–300)
HCT VFR BLD AUTO: 39.6 % (ref 37–48.5)
HGB BLD-MCNC: 12.5 G/DL (ref 12–16)
IMM GRANULOCYTES # BLD AUTO: 0.02 K/UL (ref 0–0.04)
IMM GRANULOCYTES NFR BLD AUTO: 0.2 % (ref 0–0.5)
IRON SERPL-MCNC: 89 UG/DL (ref 30–160)
LYMPHOCYTES # BLD AUTO: 3.3 K/UL (ref 1–4.8)
LYMPHOCYTES NFR BLD: 39.4 % (ref 18–48)
MCH RBC QN AUTO: 26.6 PG (ref 27–31)
MCHC RBC AUTO-ENTMCNC: 31.6 G/DL (ref 32–36)
MCV RBC AUTO: 84 FL (ref 82–98)
MONOCYTES # BLD AUTO: 0.5 K/UL (ref 0.3–1)
MONOCYTES NFR BLD: 5.4 % (ref 4–15)
NEUTROPHILS # BLD AUTO: 4.4 K/UL (ref 1.8–7.7)
NEUTROPHILS NFR BLD: 53.1 % (ref 38–73)
NRBC BLD-RTO: 0 /100 WBC
PLATELET # BLD AUTO: 420 K/UL (ref 150–350)
PMV BLD AUTO: 9.4 FL (ref 9.2–12.9)
RBC # BLD AUTO: 4.7 M/UL (ref 4–5.4)
SATURATED IRON: 25 % (ref 20–50)
TOTAL IRON BINDING CAPACITY: 360 UG/DL (ref 250–450)
TRANSFERRIN SERPL-MCNC: 243 MG/DL (ref 200–375)
WBC # BLD AUTO: 8.29 K/UL (ref 3.9–12.7)

## 2019-10-01 PROCEDURE — 85025 COMPLETE CBC W/AUTO DIFF WBC: CPT

## 2019-10-01 PROCEDURE — 83540 ASSAY OF IRON: CPT

## 2019-10-01 PROCEDURE — 36415 COLL VENOUS BLD VENIPUNCTURE: CPT | Mod: PO

## 2019-10-01 PROCEDURE — 82728 ASSAY OF FERRITIN: CPT

## 2019-10-02 ENCOUNTER — PATIENT MESSAGE (OUTPATIENT)
Dept: FAMILY MEDICINE | Facility: CLINIC | Age: 34
End: 2019-10-02

## 2019-10-16 RX ORDER — METFORMIN HYDROCHLORIDE 500 MG/1
500 TABLET, EXTENDED RELEASE ORAL 2 TIMES DAILY WITH MEALS
Qty: 180 TABLET | Refills: 3 | Status: SHIPPED | OUTPATIENT
Start: 2019-10-16 | End: 2021-10-01

## 2019-10-18 ENCOUNTER — PATIENT MESSAGE (OUTPATIENT)
Dept: PHARMACY | Facility: CLINIC | Age: 34
End: 2019-10-18

## 2019-10-25 ENCOUNTER — OFFICE VISIT (OUTPATIENT)
Dept: FAMILY MEDICINE | Facility: CLINIC | Age: 34
End: 2019-10-25
Payer: COMMERCIAL

## 2019-10-25 VITALS
TEMPERATURE: 99 F | BODY MASS INDEX: 28.85 KG/M2 | WEIGHT: 169 LBS | HEIGHT: 64 IN | SYSTOLIC BLOOD PRESSURE: 110 MMHG | OXYGEN SATURATION: 99 % | DIASTOLIC BLOOD PRESSURE: 70 MMHG | HEART RATE: 92 BPM

## 2019-10-25 DIAGNOSIS — A08.4 VIRAL GASTROENTERITIS: Primary | ICD-10-CM

## 2019-10-25 DIAGNOSIS — R52 BODY ACHES: ICD-10-CM

## 2019-10-25 LAB
INFLUENZA A, MOLECULAR: NEGATIVE
INFLUENZA B, MOLECULAR: NEGATIVE
SPECIMEN SOURCE: NORMAL

## 2019-10-25 PROCEDURE — 96372 THER/PROPH/DIAG INJ SC/IM: CPT | Mod: S$GLB,,, | Performed by: PHYSICIAN ASSISTANT

## 2019-10-25 PROCEDURE — 96372 PR INJECTION,THERAP/PROPH/DIAG2ST, IM OR SUBCUT: ICD-10-PCS | Mod: S$GLB,,, | Performed by: PHYSICIAN ASSISTANT

## 2019-10-25 PROCEDURE — 99214 OFFICE O/P EST MOD 30 MIN: CPT | Mod: 25,S$GLB,, | Performed by: PHYSICIAN ASSISTANT

## 2019-10-25 PROCEDURE — 99999 PR PBB SHADOW E&M-EST. PATIENT-LVL III: CPT | Mod: PBBFAC,,, | Performed by: PHYSICIAN ASSISTANT

## 2019-10-25 PROCEDURE — 99999 PR PBB SHADOW E&M-EST. PATIENT-LVL III: ICD-10-PCS | Mod: PBBFAC,,, | Performed by: PHYSICIAN ASSISTANT

## 2019-10-25 PROCEDURE — 87502 INFLUENZA DNA AMP PROBE: CPT | Mod: PO

## 2019-10-25 PROCEDURE — 3008F PR BODY MASS INDEX (BMI) DOCUMENTED: ICD-10-PCS | Mod: CPTII,S$GLB,, | Performed by: PHYSICIAN ASSISTANT

## 2019-10-25 PROCEDURE — 3008F BODY MASS INDEX DOCD: CPT | Mod: CPTII,S$GLB,, | Performed by: PHYSICIAN ASSISTANT

## 2019-10-25 PROCEDURE — 99214 PR OFFICE/OUTPT VISIT, EST, LEVL IV, 30-39 MIN: ICD-10-PCS | Mod: 25,S$GLB,, | Performed by: PHYSICIAN ASSISTANT

## 2019-10-25 RX ORDER — PROMETHAZINE HYDROCHLORIDE 12.5 MG/1
12.5 TABLET ORAL EVERY 6 HOURS PRN
Qty: 10 TABLET | Refills: 0 | Status: SHIPPED | OUTPATIENT
Start: 2019-10-25 | End: 2019-10-28

## 2019-10-25 RX ORDER — PROMETHAZINE HYDROCHLORIDE 25 MG/ML
25 INJECTION, SOLUTION INTRAMUSCULAR; INTRAVENOUS
Status: COMPLETED | OUTPATIENT
Start: 2019-10-25 | End: 2019-10-25

## 2019-10-25 RX ADMIN — PROMETHAZINE HYDROCHLORIDE 25 MG: 25 INJECTION, SOLUTION INTRAMUSCULAR; INTRAVENOUS at 10:10

## 2019-10-25 NOTE — PROGRESS NOTES
"phSubjective:      Patient ID: Lacey Jenkins Mondor is a 34 y.o. female.    Chief Complaint: Fatigue (since last night); Generalized Body Aches; Fever; Nausea; Dizziness; and Diarrhea    Patient is new to me, here today for sudden onset of body aches, nausea, vomiting and diarrhea   Patient states she hasn't eaten anything and vomited last time she drank water  Patient is currently breast feeding   Son at home was sick 2 days ago vomiting    Fatigue   This is a new problem. The current episode started yesterday. The problem has been gradually worsening. Associated symptoms include abdominal pain, chills, fatigue, a fever (subjective), headaches, nausea, a sore throat and vomiting. Pertinent negatives include no congestion, coughing or rash. Treatments tried: Motrin (last taken yesterday) The treatment provided no relief.     Review of Systems   Constitutional: Positive for appetite change (decreased), chills, fatigue and fever (subjective).   HENT: Positive for ear pain (L ear) and sore throat. Negative for congestion, ear discharge and rhinorrhea.    Respiratory: Negative for cough, shortness of breath and wheezing.    Gastrointestinal: Positive for abdominal pain, diarrhea, nausea and vomiting. Negative for blood in stool.   Genitourinary: Negative for dysuria, frequency and hematuria.   Musculoskeletal:        +body aches   Skin: Negative for color change, rash and wound.   Neurological: Positive for dizziness and headaches. Negative for light-headedness.       Objective:   /70 (BP Location: Left arm, Patient Position: Sitting, BP Method: Medium (Manual))   Pulse 92   Temp 99.1 °F (37.3 °C) (Oral)   Ht 5' 4" (1.626 m)   Wt 76.7 kg (169 lb)   SpO2 99%   BMI 29.01 kg/m²   Physical Exam   Constitutional: She appears well-developed and well-nourished. She appears ill. No distress.   HENT:   Head: Normocephalic and atraumatic.   Right Ear: Tympanic membrane and ear canal normal. No drainage, swelling or " tenderness. Tympanic membrane is not erythematous. No middle ear effusion.   Left Ear: Tympanic membrane and ear canal normal. No drainage, swelling or tenderness. Tympanic membrane is not erythematous.  No middle ear effusion.   Nose: No mucosal edema. Right sinus exhibits no maxillary sinus tenderness and no frontal sinus tenderness. Left sinus exhibits no maxillary sinus tenderness and no frontal sinus tenderness.   Mouth/Throat: Uvula is midline and oropharynx is clear and moist. No posterior oropharyngeal erythema.   Mild erythema of bilateral ear canals   Cardiovascular: Normal rate, regular rhythm and normal heart sounds.   No murmur heard.  Pulmonary/Chest: Effort normal and breath sounds normal. No respiratory distress. She has no decreased breath sounds. She has no wheezes. She has no rhonchi. She has no rales.   Abdominal: Soft. Normal appearance and bowel sounds are normal. There is generalized tenderness (mild).   Patient vomiting in office   Lymphadenopathy:     She has no cervical adenopathy.   Skin: Skin is warm, dry and intact. No rash noted. She is not diaphoretic.   Psychiatric: She has a normal mood and affect. Her speech is normal and behavior is normal. Thought content normal. Cognition and memory are normal.     Assessment:      1. Viral gastroenteritis    2. Body aches    3. Breast feeding status of mother       Plan:   Viral gastroenteritis  -     promethazine injection 25 mg  -     promethazine (PHENERGAN) 12.5 MG Tab; Take 1 tablet (12.5 mg total) by mouth every 6 (six) hours as needed.  Dispense: 10 tablet; Refill: 0    Body aches  -     Influenza A & B by Molecular    Breast feeding status of mother    Patient states Zofran causes abdominal pain/constipation, can not take  Per epocrates, caution advise with phenergan, however, may use short term, discussed risk vs benefit with patient, use for shortest duration possible   Discussed phenergan will cause drowsiness, her  is present  at appt to drive her home   Ensure adequate hydration with electrolyte filled fluids   Will call with flu results     Discussed worsening signs/symptoms and when to return to clinic or go to ED.   Patient expresses understanding and agrees with treatment plan.

## 2019-10-28 ENCOUNTER — PATIENT MESSAGE (OUTPATIENT)
Dept: FAMILY MEDICINE | Facility: CLINIC | Age: 34
End: 2019-10-28

## 2019-10-29 NOTE — TELEPHONE ENCOUNTER
Please schedule the patient for a nurse visit for Gardasil injection, orders are in place.  Thank you

## 2019-11-07 ENCOUNTER — CLINICAL SUPPORT (OUTPATIENT)
Dept: FAMILY MEDICINE | Facility: CLINIC | Age: 34
End: 2019-11-07
Payer: COMMERCIAL

## 2019-11-07 PROCEDURE — 90651 9VHPV VACCINE 2/3 DOSE IM: CPT | Mod: S$GLB,,, | Performed by: FAMILY MEDICINE

## 2019-11-07 PROCEDURE — 90651 HPV VACCINE 9-VALENT 3 DOSE IM: ICD-10-PCS | Mod: S$GLB,,, | Performed by: FAMILY MEDICINE

## 2019-11-07 PROCEDURE — 90471 IMMUNIZATION ADMIN: CPT | Mod: S$GLB,,, | Performed by: FAMILY MEDICINE

## 2019-11-07 PROCEDURE — 90471 HPV VACCINE 9-VALENT 3 DOSE IM: ICD-10-PCS | Mod: S$GLB,,, | Performed by: FAMILY MEDICINE

## 2019-11-08 RX ORDER — ONDANSETRON HYDROCHLORIDE 8 MG/1
8 TABLET, FILM COATED ORAL
Qty: 35 TABLET | Refills: 5 | Status: SHIPPED | OUTPATIENT
Start: 2019-11-08 | End: 2020-05-05

## 2019-11-29 ENCOUNTER — PATIENT MESSAGE (OUTPATIENT)
Dept: FAMILY MEDICINE | Facility: CLINIC | Age: 34
End: 2019-11-29

## 2019-11-29 RX ORDER — FLUCONAZOLE 150 MG/1
150 TABLET ORAL DAILY
Qty: 7 TABLET | Refills: 0 | Status: SHIPPED | OUTPATIENT
Start: 2019-11-29 | End: 2019-12-06

## 2020-01-23 ENCOUNTER — PATIENT MESSAGE (OUTPATIENT)
Dept: FAMILY MEDICINE | Facility: CLINIC | Age: 35
End: 2020-01-23

## 2020-01-24 ENCOUNTER — LAB VISIT (OUTPATIENT)
Dept: LAB | Facility: HOSPITAL | Age: 35
End: 2020-01-24
Attending: PHYSICIAN ASSISTANT
Payer: COMMERCIAL

## 2020-01-24 ENCOUNTER — OFFICE VISIT (OUTPATIENT)
Dept: FAMILY MEDICINE | Facility: CLINIC | Age: 35
End: 2020-01-24
Payer: COMMERCIAL

## 2020-01-24 VITALS
HEART RATE: 88 BPM | SYSTOLIC BLOOD PRESSURE: 126 MMHG | WEIGHT: 170.19 LBS | HEIGHT: 64 IN | BODY MASS INDEX: 29.06 KG/M2 | DIASTOLIC BLOOD PRESSURE: 80 MMHG

## 2020-01-24 LAB
ALBUMIN SERPL BCP-MCNC: 4.6 G/DL (ref 3.5–5.2)
ALP SERPL-CCNC: 90 U/L (ref 55–135)
ALT SERPL W/O P-5'-P-CCNC: 13 U/L (ref 10–44)
ANION GAP SERPL CALC-SCNC: 10 MMOL/L (ref 8–16)
AST SERPL-CCNC: 17 U/L (ref 10–40)
BASOPHILS # BLD AUTO: 0.06 K/UL (ref 0–0.2)
BASOPHILS NFR BLD: 0.6 % (ref 0–1.9)
BILIRUB SERPL-MCNC: 0.3 MG/DL (ref 0.1–1)
BUN SERPL-MCNC: 15 MG/DL (ref 6–20)
CALCIUM SERPL-MCNC: 9.4 MG/DL (ref 8.7–10.5)
CHLORIDE SERPL-SCNC: 106 MMOL/L (ref 95–110)
CO2 SERPL-SCNC: 24 MMOL/L (ref 23–29)
CREAT SERPL-MCNC: 0.8 MG/DL (ref 0.5–1.4)
DIFFERENTIAL METHOD: ABNORMAL
EOSINOPHIL # BLD AUTO: 0.1 K/UL (ref 0–0.5)
EOSINOPHIL NFR BLD: 1.3 % (ref 0–8)
ERYTHROCYTE [DISTWIDTH] IN BLOOD BY AUTOMATED COUNT: 13.2 % (ref 11.5–14.5)
EST. GFR  (AFRICAN AMERICAN): >60 ML/MIN/1.73 M^2
EST. GFR  (NON AFRICAN AMERICAN): >60 ML/MIN/1.73 M^2
FSH SERPL-ACNC: 4.8 MIU/ML
GLUCOSE SERPL-MCNC: 90 MG/DL (ref 70–110)
HCT VFR BLD AUTO: 40.6 % (ref 37–48.5)
HGB BLD-MCNC: 12.7 G/DL (ref 12–16)
IMM GRANULOCYTES # BLD AUTO: 0.03 K/UL (ref 0–0.04)
IMM GRANULOCYTES NFR BLD AUTO: 0.3 % (ref 0–0.5)
LYMPHOCYTES # BLD AUTO: 4.1 K/UL (ref 1–4.8)
LYMPHOCYTES NFR BLD: 40.8 % (ref 18–48)
MCH RBC QN AUTO: 27.6 PG (ref 27–31)
MCHC RBC AUTO-ENTMCNC: 31.3 G/DL (ref 32–36)
MCV RBC AUTO: 88 FL (ref 82–98)
MONOCYTES # BLD AUTO: 0.7 K/UL (ref 0.3–1)
MONOCYTES NFR BLD: 6.8 % (ref 4–15)
NEUTROPHILS # BLD AUTO: 5.1 K/UL (ref 1.8–7.7)
NEUTROPHILS NFR BLD: 50.2 % (ref 38–73)
NRBC BLD-RTO: 0 /100 WBC
PLATELET # BLD AUTO: 415 K/UL (ref 150–350)
PMV BLD AUTO: 9.5 FL (ref 9.2–12.9)
POTASSIUM SERPL-SCNC: 4.2 MMOL/L (ref 3.5–5.1)
PROT SERPL-MCNC: 8 G/DL (ref 6–8.4)
RBC # BLD AUTO: 4.6 M/UL (ref 4–5.4)
SODIUM SERPL-SCNC: 140 MMOL/L (ref 136–145)
T3FREE SERPL-MCNC: 3.2 PG/ML (ref 2.3–4.2)
T4 FREE SERPL-MCNC: 1.05 NG/DL (ref 0.71–1.51)
TSH SERPL DL<=0.005 MIU/L-ACNC: 2.14 UIU/ML (ref 0.4–4)
WBC # BLD AUTO: 10.05 K/UL (ref 3.9–12.7)

## 2020-01-24 PROCEDURE — 84481 FREE ASSAY (FT-3): CPT

## 2020-01-24 PROCEDURE — 36415 COLL VENOUS BLD VENIPUNCTURE: CPT | Mod: PO

## 2020-01-24 PROCEDURE — 80053 COMPREHEN METABOLIC PANEL: CPT

## 2020-01-24 PROCEDURE — 84439 ASSAY OF FREE THYROXINE: CPT

## 2020-01-24 PROCEDURE — 82672 ASSAY OF ESTROGEN: CPT

## 2020-01-24 PROCEDURE — 84443 ASSAY THYROID STIM HORMONE: CPT

## 2020-01-24 PROCEDURE — 85025 COMPLETE CBC W/AUTO DIFF WBC: CPT

## 2020-01-24 PROCEDURE — 99214 PR OFFICE/OUTPT VISIT, EST, LEVL IV, 30-39 MIN: ICD-10-PCS | Mod: S$GLB,,, | Performed by: PHYSICIAN ASSISTANT

## 2020-01-24 PROCEDURE — 3008F BODY MASS INDEX DOCD: CPT | Mod: CPTII,S$GLB,, | Performed by: PHYSICIAN ASSISTANT

## 2020-01-24 PROCEDURE — 3008F PR BODY MASS INDEX (BMI) DOCUMENTED: ICD-10-PCS | Mod: CPTII,S$GLB,, | Performed by: PHYSICIAN ASSISTANT

## 2020-01-24 PROCEDURE — 99999 PR PBB SHADOW E&M-EST. PATIENT-LVL IV: ICD-10-PCS | Mod: PBBFAC,,, | Performed by: PHYSICIAN ASSISTANT

## 2020-01-24 PROCEDURE — 83001 ASSAY OF GONADOTROPIN (FSH): CPT

## 2020-01-24 PROCEDURE — 99999 PR PBB SHADOW E&M-EST. PATIENT-LVL IV: CPT | Mod: PBBFAC,,, | Performed by: PHYSICIAN ASSISTANT

## 2020-01-24 PROCEDURE — 99214 OFFICE O/P EST MOD 30 MIN: CPT | Mod: S$GLB,,, | Performed by: PHYSICIAN ASSISTANT

## 2020-01-24 RX ORDER — SERTRALINE HYDROCHLORIDE 50 MG/1
50 TABLET, FILM COATED ORAL DAILY
Qty: 30 TABLET | Refills: 1 | Status: SHIPPED | OUTPATIENT
Start: 2020-01-24 | End: 2020-03-14 | Stop reason: SDUPTHER

## 2020-01-24 RX ORDER — HYDROXYZINE PAMOATE 50 MG/1
50 CAPSULE ORAL EVERY 8 HOURS PRN
Qty: 20 CAPSULE | Refills: 0 | Status: SHIPPED | OUTPATIENT
Start: 2020-01-24 | End: 2021-10-01

## 2020-01-24 NOTE — PATIENT INSTRUCTIONS
"  Understanding Postpartum Depression  Youve just had a baby. You know you should be excited and happy. But instead you find yourself crying for no reason. You may have trouble coping with your daily tasks. You feel sad, tired, and hopeless most of the time. You may even feel ashamed or guilty. But what youre going through is not your fault and you can feel better. Talk to your healthcare provider. He or she can help.    What is depression?  Depression is a mood disorder that affects the way you think and feel. The most common symptom is a feeling of deep sadness. You may also feel as if you just cant cope with life. Other symptoms include:  · Gaining or losing a lot of weight  · Sleeping too much or too little  · Feeling tired all the time  · Feeling restless  · Crying a lot  · Having too little or too much appetite.  · Withdrawing from friends and family  · Having headaches, aches and pains, or stomach problems that won't go away.  · Fears of harming your baby  · Lack of interest in your baby  · Feeling worthless or guilty  · No longer finding pleasure in things you used to  · Having trouble thinking clearly or making decisions  · Thinking about death or suicide   Depression after childbirth  You may be weepy and tired right after giving birth. These feelings are normal. Theyre sometimes called the baby blues. These blues go away after 2 or 3 weeks. However, postpartum (meaning after birth) depression lasts much longer and is more severe than the "baby blues." It can make you feel sad and hopeless. You may also fear that your baby will be harmed and worry about being a bad mother.  What causes postpartum depression?  The exact cause of postpartum depression is unknown. Changes in brain chemistry or structure are believed to play a big role in depression. It may be due to changes in your hormones during and after childbirth. You may also be tired from caring for your baby and adjusting to being a mother. " All these factors may make you feel depressed. In some cases, your genes may also play a role.  Depression can be treated  The good news is that there are many ways to treat postpartum depression. Talking to your healthcare provider is the first step toward feeling better.  Resources  · National [place] of Mental Fkpatk041-300-3334mex.[facility].nih.gov  · National [place] on Mental Ktdmobv046-269-1897ksq.[place].org  · Mental Health Opdbxci625-216-8314vri.[facility].org  · National Suicide Trpnlxl700-030-1586 (800-SUICIDE)   Date Last Reviewed: 8/16/2015  © 0323-9813 [employer]. [address] [place] [address], [place] [address]. All rights reserved. This information is not intended as a substitute for professional medical care. Always follow your healthcare professional's instructions.

## 2020-01-27 ENCOUNTER — PATIENT MESSAGE (OUTPATIENT)
Dept: FAMILY MEDICINE | Facility: CLINIC | Age: 35
End: 2020-01-27

## 2020-01-27 LAB — ESTROGEN SERPL-MCNC: 191 PG/ML

## 2020-01-27 NOTE — TELEPHONE ENCOUNTER
Over the past 3 years the platlets have been elevated. Giving blood may not be the best choice since the hemoglobin is border line anemic. Since this is seen on several lab results, I suggest of follow up with Dr Anedrsen to see if a hematologist consult is needed.

## 2020-01-27 NOTE — PROGRESS NOTES
Subjective:       Patient ID: Lacey Jenkins Mondor is a 34 y.o. female.    Chief Complaint: Anxiety    Anxiety   Presents for initial visit. Onset was 1 to 4 weeks ago. The problem has been rapidly worsening. Symptoms include confusion, decreased concentration, depressed mood, excessive worry, irritability, muscle tension, nervous/anxious behavior and restlessness. Patient reports no chest pain, shortness of breath or suicidal ideas. Symptoms occur constantly. The severity of symptoms is causing significant distress and interfering with daily activities. Exacerbated by: unknown besides recent birth of her child. The quality of sleep is poor.     There are no known risk factors. Past treatments include nothing.     Past Medical History:   Diagnosis Date    Abnormal thyroid stimulating hormone (TSH) level     Depression     Goiter     Ovarian cyst     Pancreatitis     Preeclampsia        Review of Systems   Constitutional: Positive for irritability. Negative for activity change, chills and fatigue.   Respiratory: Negative for chest tightness and shortness of breath.    Cardiovascular: Negative for chest pain.   Gastrointestinal: Negative for abdominal pain.   Psychiatric/Behavioral: Positive for agitation, confusion, decreased concentration and dysphoric mood. Negative for behavioral problems, hallucinations, self-injury, sleep disturbance and suicidal ideas. The patient is nervous/anxious. The patient is not hyperactive.        Objective:      Physical Exam   Constitutional: She appears well-developed and well-nourished. No distress.   Cardiovascular: Normal rate and regular rhythm.   Pulmonary/Chest: Effort normal and breath sounds normal.   Abdominal: Soft. Bowel sounds are normal.   Skin: She is not diaphoretic.   Psychiatric: Her speech is normal and behavior is normal. Judgment and thought content normal. Her mood appears anxious. She is not actively hallucinating. Cognition and memory are normal. She  exhibits a depressed mood.   Has an overwhelming feeling of inadequacy.  She is attentive.   Nursing note and vitals reviewed.      Assessment:       1. Post-partum depression        Plan:       Post-partum depression  -     Comprehensive metabolic panel; Future; Expected date: 01/24/2020  -     CBC auto differential; Future; Expected date: 01/24/2020  -     T3, free; Future; Expected date: 01/24/2020  -     T4, free; Future; Expected date: 01/24/2020  -     TSH; Future; Expected date: 01/24/2020  -     Follicle stimulating hormone; Future; Expected date: 01/24/2020  -     ESTROGENS, TOTAL; Future; Expected date: 01/24/2020    Other orders  -     sertraline (ZOLOFT) 50 MG tablet; Take 1 tablet (50 mg total) by mouth once daily.  Dispense: 30 tablet; Refill: 1  -     hydrOXYzine pamoate (VISTARIL) 50 MG Cap; Take 1 capsule (50 mg total) by mouth every 8 (eight) hours as needed.  Dispense: 20 capsule; Refill: 0

## 2020-01-30 ENCOUNTER — OFFICE VISIT (OUTPATIENT)
Dept: FAMILY MEDICINE | Facility: CLINIC | Age: 35
End: 2020-01-30
Payer: COMMERCIAL

## 2020-01-30 VITALS
SYSTOLIC BLOOD PRESSURE: 122 MMHG | HEIGHT: 64 IN | DIASTOLIC BLOOD PRESSURE: 82 MMHG | BODY MASS INDEX: 29.02 KG/M2 | OXYGEN SATURATION: 96 % | WEIGHT: 170 LBS | HEART RATE: 74 BPM

## 2020-01-30 DIAGNOSIS — F41.9 ANXIETY: Primary | ICD-10-CM

## 2020-01-30 PROCEDURE — 99213 PR OFFICE/OUTPT VISIT, EST, LEVL III, 20-29 MIN: ICD-10-PCS | Mod: S$GLB,,, | Performed by: FAMILY MEDICINE

## 2020-01-30 PROCEDURE — 99999 PR PBB SHADOW E&M-EST. PATIENT-LVL IV: ICD-10-PCS | Mod: PBBFAC,,, | Performed by: FAMILY MEDICINE

## 2020-01-30 PROCEDURE — 3008F PR BODY MASS INDEX (BMI) DOCUMENTED: ICD-10-PCS | Mod: CPTII,S$GLB,, | Performed by: FAMILY MEDICINE

## 2020-01-30 PROCEDURE — 99213 OFFICE O/P EST LOW 20 MIN: CPT | Mod: S$GLB,,, | Performed by: FAMILY MEDICINE

## 2020-01-30 PROCEDURE — 99999 PR PBB SHADOW E&M-EST. PATIENT-LVL IV: CPT | Mod: PBBFAC,,, | Performed by: FAMILY MEDICINE

## 2020-01-30 PROCEDURE — 3008F BODY MASS INDEX DOCD: CPT | Mod: CPTII,S$GLB,, | Performed by: FAMILY MEDICINE

## 2020-01-30 NOTE — PATIENT INSTRUCTIONS
Eating Heart-Healthy Food: Using the DASH Plan    Eating for your heart doesnt have to be hard or boring. You just need to know how to make healthier choices. The DASH eating plan has been developed to help you do just that. DASH stands for Dietary Approaches to Stop Hypertension. It is a plan that has been proven to be healthier for your heart and to lower your risk for high blood pressure. It can also help lower your risk for cancer, heart disease, osteoporosis, and diabetes.  Choosing from each food group  Choose foods from each of the food groups below each day. Try to get the recommended number of servings for each food group. The serving numbers are based on a diet of 2,000 calories a day. Talk to your doctor if youre unsure about your calorie needs. Along with getting the correct servings, the DASH plan also recommends a sodium intake less than 2,300 mg per day.        Grains  Servings: 6 to 8 a day  A serving is:  · 1 slice bread  · 1 ounce dry cereal  · Half a cup cooked rice, pasta or cereal  Best choices: Whole grains and any grains high in fiber. Vegetables  Servings: 4 to 5 a day  A serving is:  · 1 cup raw leafy vegetable  · Half a cup cut-up raw or cooked vegetable  · Half a cup vegetable juice  Best choices: Fresh or frozen vegetables prepared without added salt or fat.   Fruits  Servings: 4 to 5 a day  A serving is:  · 1 medium fruit  · One-quarter cup dried fruit  · Half a cup fresh, frozen, or canned fruit  · Half a cup of 100% fruit juices  Best choices: A variety of fresh fruits of different colors. Whole fruits are a better choice than fruit juices. Low-fat or fat-free dairy  Servings: 2 to 3 a day  A serving is:  · 1 cup milk  · 1 cup yogurt  · One and a half ounces cheese  Best choices: Skim or 1% milk, low-fat or fat-free yogurt or buttermilk, and low-fat cheeses.         Lean meats, poultry, fish  Servings: 6 or fewer a day  A serving is:  · 1 ounce cooked meats, poultry, or fish  · 1  egg  Best choices: Lean poultry and fish. Trim away visible fat. Broil, grill, roast, or boil instead of frying. Remove skin from poultry before eating. Limit how much red meat you eat.  Nuts, seeds, beans  Servings: 4 to 5 a week  A serving is:  · One-third cup nuts (one and a half ounces)  · 2 tablespoons nut butter or seeds  · Half a cup cooked dry beans or legumes  Best choices: Dry roasted nuts with no salt added, lentils, kidney beans, garbanzo beans, and whole henderson beans.   Fats and oils  Servings: 2 to 3 a day  A serving is:  · 1 teaspoon vegetable oil  · 1 teaspoon soft margarine  · 1 tablespoon mayonnaise  · 2 tablespoons salad dressing  Best choices: Nut and vegetable oils (nontropical vegetable oils), such as olive and canola oil. Sweets  Servings: 5 a week or fewer  A serving is:  · 1 tablespoon sugar, maple syrup, or honey  · 1 tablespoon jam or jelly  · 1 half-ounce jelly beans (about 15)  · 1 cup lemonade  Best choices: Dried fruit can be a satisfying sweet. Choose low-fat sweets. And watch your serving sizes!      For more on the DASH eating plan, visit:  www.nhlbi.nih.gov/health/health-topics/topics/dash   Date Last Reviewed: 6/1/2016  © 5414-9982 [employer]. [address] [place] [address], [place] [address]. All rights reserved. This information is not intended as a substitute for professional medical care. Always follow your healthcare professional's instructions.

## 2020-01-30 NOTE — PROGRESS NOTES
Subjective:       Patient ID: Lacey Jenkins Mondor is a 34 y.o. female.    Chief Complaint: Anxiety (follow up)    Anxiety   Presents for follow-up visit. Symptoms include depressed mood, excessive worry, irritability, nervous/anxious behavior and panic. Patient reports no chest pain, compulsions, confusion, decreased concentration, dizziness, dry mouth, feeling of choking, hyperventilation, impotence, insomnia, malaise, muscle tension, nausea, obsessions, palpitations, restlessness, shortness of breath or suicidal ideas. Primary symptoms comment: The patient experience symptoms of dizziness while taking sertraline and start to take the medication at nighttime.. Symptoms occur most days. The severity of symptoms is moderate. The quality of sleep is poor. Nighttime awakenings: several.     Compliance with medications is %. Treatment side effects: Insomnia.     Past medical history, past social history was reviewed and discussed with the patient.    Review of Systems   Constitutional: Positive for irritability. Negative for activity change and appetite change.   HENT: Negative for congestion and ear discharge.    Eyes: Negative for discharge and itching.   Respiratory: Negative for choking, chest tightness and shortness of breath.    Cardiovascular: Negative for chest pain, palpitations and leg swelling.   Gastrointestinal: Negative for abdominal distention, abdominal pain and nausea.   Endocrine: Negative for cold intolerance and heat intolerance.   Genitourinary: Negative for dysuria, flank pain and impotence.   Musculoskeletal: Negative for arthralgias and back pain.   Skin: Negative for pallor and rash.   Allergic/Immunologic: Negative for environmental allergies and food allergies.   Neurological: Negative for dizziness and facial asymmetry.   Hematological: Negative for adenopathy. Does not bruise/bleed easily.   Psychiatric/Behavioral: Positive for dysphoric mood and sleep disturbance. Negative for  agitation, confusion, decreased concentration and suicidal ideas. The patient is nervous/anxious. The patient does not have insomnia.        Objective:      Physical Exam   Constitutional: She appears well-developed and well-nourished. No distress.   HENT:   Head: Normocephalic and atraumatic.   Right Ear: External ear normal.   Left Ear: External ear normal.   Mouth/Throat: Oropharynx is clear and moist. No oropharyngeal exudate.   Eyes: Conjunctivae are normal. Right eye exhibits no discharge. Left eye exhibits no discharge. No scleral icterus.   Neck: Neck supple. No tracheal deviation present. No thyromegaly present.   Musculoskeletal: She exhibits no tenderness or deformity.   Neurological: No cranial nerve deficit. Coordination normal.   Skin: No rash noted. She is not diaphoretic. No erythema. No pallor.   Psychiatric: She has a normal mood and affect. Her behavior is normal. Judgment and thought content normal.   Nursing note and vitals reviewed.      Assessment:       1. Anxiety        Plan:       Anxiety:  Improved  -     Ambulatory referral to Psychology    The patient was advised to start taking the sertraline in the morning and take the Vistaril at night.  Will refer the patient to the psychologist for counseling on anxiety.Patient agreed with assessment and plan. Patient verbalized understanding.

## 2020-02-03 RX ORDER — OSELTAMIVIR PHOSPHATE 75 MG/1
75 CAPSULE ORAL DAILY
Qty: 10 CAPSULE | Refills: 0 | Status: SHIPPED | OUTPATIENT
Start: 2020-02-03 | End: 2020-02-15

## 2020-03-10 ENCOUNTER — PATIENT MESSAGE (OUTPATIENT)
Dept: FAMILY MEDICINE | Facility: CLINIC | Age: 35
End: 2020-03-10

## 2020-03-16 RX ORDER — SERTRALINE HYDROCHLORIDE 50 MG/1
50 TABLET, FILM COATED ORAL DAILY
Qty: 90 TABLET | Refills: 0 | Status: SHIPPED | OUTPATIENT
Start: 2020-03-16 | End: 2020-10-06

## 2020-03-16 NOTE — TELEPHONE ENCOUNTER
Please see the following assessment. This refill request still requires some action on your part.      Sertraline has not previously been prescribed by current PCP. [facility] protocol to renew. Pended for your review. Defer refill request to you.      Thank you!

## 2020-03-16 NOTE — PROGRESS NOTES
Refill Authorization Note    Mrs.Mondor is requesting a refill authorization.    Brief assessment and rationale for refill: REVIEW; not previously prescribed by PCP          Medication Therapy Plan: last prescribed by jeanine tabares pa-c, please review     Medication reconciliation completed: No                         Comments:   Requested Prescriptions   Pending Prescriptions Disp Refills    sertraline (ZOLOFT) 50 MG tablet 30 tablet 1     Sig: Take 1 tablet (50 mg total) by mouth once daily.       Psychiatry:  Antidepressants - SSRI Passed - 3/14/2020  3:43 PM        Passed - Patient is at least 18 years old        Passed - Negative Pregnancy Status Check        Passed - Office visit in past 6 months or future 90 days.     Recent Outpatient Visits            1 month ago Anxiety    [facility] Sandra Andersen MD    1 month ago Post-partum depression    [facility] Jeanine Tabares III, PA-C    4 months ago Viral gastroenteritis    [facility] Jael Roy PA-C    6 months ago Encounter for routine adult health examination with abnormal findings    [facility] Sandra Andersen MD    8 months ago Eustachian tube dysfunction, bilateral    [facility] Debby Hermosillo NP                     Appointments  past 12m or future 3m with PCP    Date Provider   Last Visit   1/30/2020 Sandra Andersen MD   Next Visit   Visit date not found Sandra Andersen MD   .  ED visits in past 90 days: 0       Note composed:9:12 PM 03/15/2020

## 2020-03-31 ENCOUNTER — PATIENT MESSAGE (OUTPATIENT)
Dept: FAMILY MEDICINE | Facility: CLINIC | Age: 35
End: 2020-03-31

## 2020-03-31 NOTE — TELEPHONE ENCOUNTER
Actually because she has a nosebleed, I will have to examine her to see if she has any points of bleeding and if I need to do any cauterization, I can see her in the office today or tomorrow.  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  I am living early today, my last appointment is at 3:20 p.m. thank you

## 2020-04-01 ENCOUNTER — OFFICE VISIT (OUTPATIENT)
Dept: FAMILY MEDICINE | Facility: CLINIC | Age: 35
End: 2020-04-01
Payer: COMMERCIAL

## 2020-04-01 VITALS
TEMPERATURE: 98 F | WEIGHT: 168 LBS | HEART RATE: 66 BPM | BODY MASS INDEX: 28.84 KG/M2 | SYSTOLIC BLOOD PRESSURE: 128 MMHG | OXYGEN SATURATION: 98 % | DIASTOLIC BLOOD PRESSURE: 78 MMHG

## 2020-04-01 DIAGNOSIS — H93.8X3 PRESSURE SENSATION IN BOTH EARS: ICD-10-CM

## 2020-04-01 DIAGNOSIS — R04.0 EPISTAXIS: Primary | ICD-10-CM

## 2020-04-01 PROCEDURE — 99213 PR OFFICE/OUTPT VISIT, EST, LEVL III, 20-29 MIN: ICD-10-PCS | Mod: S$GLB,,, | Performed by: FAMILY MEDICINE

## 2020-04-01 PROCEDURE — 99999 PR PBB SHADOW E&M-EST. PATIENT-LVL III: ICD-10-PCS | Mod: PBBFAC,,, | Performed by: FAMILY MEDICINE

## 2020-04-01 PROCEDURE — 99213 OFFICE O/P EST LOW 20 MIN: CPT | Mod: S$GLB,,, | Performed by: FAMILY MEDICINE

## 2020-04-01 PROCEDURE — 3008F BODY MASS INDEX DOCD: CPT | Mod: CPTII,S$GLB,, | Performed by: FAMILY MEDICINE

## 2020-04-01 PROCEDURE — 3008F PR BODY MASS INDEX (BMI) DOCUMENTED: ICD-10-PCS | Mod: CPTII,S$GLB,, | Performed by: FAMILY MEDICINE

## 2020-04-01 PROCEDURE — 99999 PR PBB SHADOW E&M-EST. PATIENT-LVL III: CPT | Mod: PBBFAC,,, | Performed by: FAMILY MEDICINE

## 2020-04-01 NOTE — PATIENT INSTRUCTIONS
Nosebleed (Adult)    Bleeding from the nose most commonly occurs because of injury or drying and cracking of the inner lining of the nose. Most nosebleeds are because of dry air or nose-picking. They can occur during a common cold or an allergy attack. They can also occur on a very hot day, or from dry air in the winter.  If the bleeding site is found, it may be cauterized. This means it is treated to cause a blood clot to form. This may be done with a chemical, heat, or electricity. If the bleeding continues after the site is cauterized, or if the site cannot be found, packing may be put in your nose. This is to apply pressure and stop the bleeding. The packing may be made of gauze or sponge. A small balloon catheter is sometimes used. These must be removed by your doctor. Some types of packing dissolve on their own.  Home care  · If packing was put in your nose, unless told otherwise, do not pull on it or try to remove it yourself. You will be given an appointment to have it removed. You may also have been given antibiotics to prevent a sinus infection. If so, finish all of the medicine.  · Do not blow your nose for 12 hours after the bleeding stops. This will allow a strong blood clot to form. Do not pick your nose. This may restart bleeding.  · Avoid drinking alcohol and hot liquids for the next 2 days. Alcohol or hot liquids in your mouth can dilate blood vessels in your nose. This can cause bleeding to start again.  · Do not take ibuprofen, naproxen, or medicines that contain aspirin. These thin the blood and may cause your nose to bleed. You may take acetaminophen for pain, unless another pain medicine was prescribed.  · If the bleeding starts again, sit up and lean forward to prevent swallowing blood. Pinch your nose tightly on both sides, as shown above, for 10 to 15 minutes. Time yourself. Dont release the pressure on your nose until 10 minutes is up. If bleeding does not stop, continue to pinch your  nose and call your healthcare provider or return to this facility.  · If you have a cold, allergies, or dry nasal membranes, lubricate the nasal passages. Apply a small amount of petroleum jelly inside the nose with a cotton swab twice a day (morning and night).  · Avoid overheating your home. This can dry the air and make your condition worse.  · Put a humidifier in the room where you sleep. This will add moisture to the air.  · Use a saline nasal spray to keep nasal passages moist.  · Do not pick your nose. Keep fingernails trimmed to decrease risk of bleeds.  · Do not smoke.  Follow-up care  Follow up with your healthcare provider, or as advised. Nasal packing should be rechecked or removed within 2 to 3 days.  When to seek medical advice  Call your healthcare provider right away if any of these occur.  · You have another nosebleed that you cannot control  · Dizziness, weakness, or fainting  · You become tired or confused  · Fever of 100.4ºF (38ºC) or higher, or as directed by your healthcare provider  · Headache  · Sinus or facial pain  · Shortness of breath or trouble breathing  Date Last Reviewed: 3/22/2015  © 2842-6872 [employer]. [address] [place] [address], [place] [address]. All rights reserved. This information is not intended as a substitute for professional medical care. Always follow your healthcare professional's instructions.

## 2020-04-01 NOTE — PROGRESS NOTES
Subjective:       Patient ID: Lacey Jenkins Mondor is a 34 y.o. female.    Chief Complaint: Epistaxis    Epistaxis    The bleeding has been from the left nare. This is a new problem. The current episode started yesterday. She has experienced no nasal trauma. The problem occurs constantly. The problem has been resolved. The bleeding is associated with nothing. She has tried pressure for the symptoms. The treatment provided moderate relief. Her past medical history is significant for allergies. There is no history of a bleeding disorder, colds, frequent nosebleeds or sinus problems.   The patient also states that he is having pressure in bilateral ears but this is been going on for more than 2 years.  The patient also complains sometimes of postnasal drip and congestion, but she does not take any medications for this.    Past medical history, past social history was reviewed and discussed with the patient.    Review of Systems   Constitutional: Negative for activity change and unexpected weight change.   HENT: Positive for congestion, nosebleeds and postnasal drip. Negative for hearing loss, rhinorrhea and trouble swallowing.    Eyes: Negative for discharge and visual disturbance.   Respiratory: Negative for apnea, chest tightness and wheezing.    Cardiovascular: Negative for chest pain and palpitations.   Gastrointestinal: Negative for blood in stool, constipation, diarrhea and vomiting.   Endocrine: Negative for polydipsia and polyuria.   Genitourinary: Negative for difficulty urinating, dysuria, hematuria and menstrual problem.   Musculoskeletal: Positive for neck pain. Negative for arthralgias and joint swelling.   Neurological: Negative for weakness and headaches.   Psychiatric/Behavioral: Negative for confusion and dysphoric mood.       Objective:      Physical Exam   Constitutional: She appears well-developed and well-nourished. No distress.   HENT:   Head: Normocephalic and atraumatic.   Right Ear: External  ear normal.   Left Ear: External ear normal.   Nose: No mucosal edema, rhinorrhea, nose lacerations or sinus tenderness. No epistaxis.   Mouth/Throat: Oropharynx is clear and moist. No oropharyngeal exudate or posterior oropharyngeal edema.   Eyes: Pupils are equal, round, and reactive to light. Conjunctivae are normal. Right eye exhibits no discharge. Left eye exhibits no discharge. No scleral icterus.   Neck: Neck supple. No tracheal deviation present. No thyromegaly present.   Cardiovascular: Normal rate, regular rhythm and normal heart sounds.   No murmur heard.  Pulmonary/Chest: Effort normal and breath sounds normal. No respiratory distress. She has no wheezes.   Abdominal: She exhibits no mass. There is no tenderness.   Musculoskeletal: She exhibits no tenderness or deformity.   Neurological: No cranial nerve deficit. Coordination normal.   Skin: She is not diaphoretic. No erythema. No pallor.   Psychiatric: She has a normal mood and affect. Her behavior is normal. Judgment and thought content normal.   Nursing note and vitals reviewed.      Assessment:       1. Epistaxis        Plan:       Epistaxis:  New problem, next visit workup.    Ear pressure:  New problem, next visit workup    The patient was advised to drink plenty of water, can apply a nasal saline solution, also Vaseline with a Q-tip.  If the symptoms recur, the patient will notify us immediately.  This is the patient's 1st episode, blood pressure was good.  Patient agreed with assessment and plan. Patient verbalized understanding.

## 2020-04-16 ENCOUNTER — PATIENT MESSAGE (OUTPATIENT)
Dept: FAMILY MEDICINE | Facility: CLINIC | Age: 35
End: 2020-04-16

## 2020-04-16 RX ORDER — CIPROFLOXACIN 500 MG/1
500 TABLET ORAL 2 TIMES DAILY
Qty: 14 TABLET | Refills: 0 | Status: SHIPPED | OUTPATIENT
Start: 2020-04-16 | End: 2020-04-24

## 2020-04-16 NOTE — TELEPHONE ENCOUNTER
Ciprofloxacin was prescribed if the diarrhea do not subside in the next 72 hr or is having problems with fever still.  If the symptoms get worse she will notify us immediately.  If still having fever, will have to check the patient for COVID-19.  Thank you

## 2020-04-21 DIAGNOSIS — Z01.84 ANTIBODY RESPONSE EXAMINATION: ICD-10-CM

## 2020-04-23 ENCOUNTER — LAB VISIT (OUTPATIENT)
Dept: LAB | Facility: HOSPITAL | Age: 35
End: 2020-04-23
Attending: INTERNAL MEDICINE
Payer: COMMERCIAL

## 2020-04-23 DIAGNOSIS — H65.93 OME (OTITIS MEDIA WITH EFFUSION), BILATERAL: ICD-10-CM

## 2020-04-23 DIAGNOSIS — Z01.84 ANTIBODY RESPONSE EXAMINATION: ICD-10-CM

## 2020-04-23 LAB — SARS-COV-2 IGG SERPL QL IA: NEGATIVE

## 2020-04-23 PROCEDURE — 36415 COLL VENOUS BLD VENIPUNCTURE: CPT | Mod: PO

## 2020-04-23 PROCEDURE — 86769 SARS-COV-2 COVID-19 ANTIBODY: CPT

## 2020-04-24 RX ORDER — AZELASTINE 1 MG/ML
1 SPRAY, METERED NASAL 2 TIMES DAILY
Qty: 30 ML | Refills: 2 | Status: SHIPPED | OUTPATIENT
Start: 2020-04-24 | End: 2021-10-01 | Stop reason: SINTOL

## 2020-04-28 RX ORDER — CYCLOBENZAPRINE HCL 10 MG
10 TABLET ORAL NIGHTLY PRN
Qty: 10 TABLET | Refills: 0 | Status: SHIPPED | OUTPATIENT
Start: 2020-04-28 | End: 2020-05-08

## 2020-05-05 RX ORDER — ONDANSETRON HYDROCHLORIDE 8 MG/1
8 TABLET, FILM COATED ORAL
Qty: 35 TABLET | Refills: 5 | Status: SHIPPED | OUTPATIENT
Start: 2020-05-05 | End: 2021-10-01

## 2020-05-07 ENCOUNTER — PATIENT MESSAGE (OUTPATIENT)
Dept: FAMILY MEDICINE | Facility: CLINIC | Age: 35
End: 2020-05-07

## 2020-05-08 ENCOUNTER — PATIENT MESSAGE (OUTPATIENT)
Dept: FAMILY MEDICINE | Facility: CLINIC | Age: 35
End: 2020-05-08

## 2020-05-08 RX ORDER — BUPROPION HYDROCHLORIDE 150 MG/1
150 TABLET ORAL DAILY
Qty: 30 TABLET | Refills: 11 | Status: SHIPPED | OUTPATIENT
Start: 2020-05-08 | End: 2020-06-04 | Stop reason: DRUGHIGH

## 2020-06-03 ENCOUNTER — PATIENT MESSAGE (OUTPATIENT)
Dept: FAMILY MEDICINE | Facility: CLINIC | Age: 35
End: 2020-06-03

## 2020-06-04 RX ORDER — BUPROPION HYDROCHLORIDE 300 MG/1
300 TABLET ORAL DAILY
Qty: 30 TABLET | Refills: 11 | Status: SHIPPED | OUTPATIENT
Start: 2020-06-04 | End: 2020-10-06

## 2020-06-10 ENCOUNTER — OFFICE VISIT (OUTPATIENT)
Dept: PSYCHIATRY | Facility: CLINIC | Age: 35
End: 2020-06-10
Payer: COMMERCIAL

## 2020-06-10 DIAGNOSIS — F41.9 ANXIETY DISORDER, UNSPECIFIED TYPE: Primary | ICD-10-CM

## 2020-06-10 PROCEDURE — 90791 PSYCH DIAGNOSTIC EVALUATION: CPT | Mod: 95,,, | Performed by: PSYCHOLOGIST

## 2020-06-10 PROCEDURE — 90791 PR PSYCHIATRIC DIAGNOSTIC EVALUATION: ICD-10-PCS | Mod: 95,,, | Performed by: PSYCHOLOGIST

## 2020-06-10 NOTE — PROGRESS NOTES
Behavioral Health: Initial  Date: 06/10/2020   Referring Provider: Dr. Andersen  The patient location is:  [address] [place] [address]  The patient phone number is: [phone]  Visit type: Virtual visit with synchronous audio and video  Each patient to whom he or she provides medical services by telemedicine is:  (1) informed of the relationship between the physician and patient and the respective role of any other health care provider with respect to management of the patient; and (2) notified that he or she may decline to receive medical services by telemedicine and may withdraw from such care at any time.    Hx of present illness: Patient was referred by PCP for symptoms of anxiety.  Patient was seen, examined and chart was reviewed.  Patient presents today noting since February 2020 she started to experience worsening symptoms of anxiety.  Patient notes her  was hospitalized for a stomach virus in March 2020 and due to COVID-19 restrictions she was unable to be with him in the hospital.  Patient notes her  is diabetic and became very ill.  Patient notes during COVID-19 pandemic she has been experiencing worsening symptoms of anxiety. Patient notes her husbands illness significantly exacerbated her anxiety about COVID-19.  Of note, patient notes she was also weaning off of breastfeeding her 2nd child in February.  Patient notes she was initially prescribed Zoloft and then was switched and is now prescribed Wellbutrin.  Patient notes she has been experiencing significant difficulty with anxiety at work due to anxiety of keeping things clean for patients, staff and herself due to COVID-19 pandemic.    Past Medical History:   Diagnosis Date    Abnormal thyroid stimulating hormone (TSH) level     Depression     Goiter     Ovarian cyst     Pancreatitis     Preeclampsia        Current Outpatient Medications:     acetaminophen (TYLENOL ARTHRITIS ORAL), Take by mouth., Disp: , Rfl:      AMINOCAPROIC ACID ORAL, Take 5 g by mouth once., Disp: , Rfl:     ascorbic acid, vitamin C, (VITAMIN C) 250 MG tablet, Take 250 mg by mouth once daily., Disp: , Rfl:     azelastine (ASTELIN) 137 mcg (0.1 %) nasal spray, Use 1 spray (137 mcg total) by Nasal route 2 (two) times daily., Disp: 30 mL, Rfl: 2    b complex vitamins tablet, Take 1 tablet by mouth once daily., Disp: , Rfl:     buPROPion (WELLBUTRIN XL) 300 MG 24 hr tablet, Take 1 tablet (300 mg total) by mouth once daily., Disp: 30 tablet, Rfl: 11    cholecalciferol, vitamin D3, (VITAMIN D3) 5,000 unit Tab, Take 5,000 Units by mouth once daily., Disp: , Rfl:     cyanocobalamin/cobamamide (B12 SL), Place 1 tablet under the tongue once daily., Disp: , Rfl:     docusate sodium 100 mg capsule, Take 200 mg by mouth once daily., Disp: , Rfl:     ferrous sulfate, dried (SLOW FE) 160 mg (50 mg iron) TbSR, Take 1 tablet (160 mg total) by mouth 2 (two) times daily., Disp: 60 tablet, Rfl: 5    fluticasone propionate (FLONASE) 50 mcg/actuation nasal spray, Use 1 spray (50 mcg total) in each nostril once daily., Disp: 16 g, Rfl: 5    hydrocortisone (ANUSOL-HC) 2.5 % rectal cream, Apply as directed to affected area three times a day, Disp: 30 g, Rfl: 1    hydrOXYzine pamoate (VISTARIL) 50 MG Cap, Take 1 capsule (50 mg total) by mouth every 8 (eight) hours as needed., Disp: 20 capsule, Rfl: 0    metFORMIN (GLUCOPHAGE-XR) 500 MG XR 24hr tablet, Take 1 tablet (500 mg total) by mouth 2 (two) times daily with meals., Disp: 180 tablet, Rfl: 3    norethindrone-ethinyl estradiol (NECON) 0.5-35 mg-mcg per tablet, Take 1 tablet by mouth once a day, Disp: 28 tablet, Rfl: 1    ondansetron (ZOFRAN) 8 MG tablet, Take 1 tablet (8 mg total) by mouth every 6 to 8 hours as needed., Disp: 35 tablet, Rfl: 5    pantoprazole (PROTONIX) 20 MG tablet, Take 1 tablet (20 mg total) by mouth once daily., Disp: 30 tablet, Rfl: 11    sertraline (ZOLOFT) 50 MG tablet, Take 1 tablet (50  mg total) by mouth once daily., Disp: 90 tablet, Rfl: 0    triamcinolone (NASACORT) 55 mcg nasal inhaler, Use 2 sprays by Nasal route once daily., Disp: 16.9 mL, Rfl: 6    triamcinolone acetonide 0.1% (KENALOG) 0.1 % cream, Apply topically 2 (two) times daily. hands, Disp: 453.6 g, Rfl: 1    wheat dextrin (BENEFIBER CLEAR SF, DEXTRIN, ORAL), Take 2-4 Doses by mouth once daily., Disp: , Rfl:     Psychiatric Hx:  Patient notes at the age of 25 years old she had a break-up and was prescribed medication for management of anxiety at that time.  Patient notes due to break-up she experienced significant familial stress during that time.  Patient notes hx of engagement in counseling during that time as well.  Patient denies hx of inpatient psychiatric treatment.  Patient notes she was previously prescribed Zoloft and then was switched to Wellbutrin.     Substance Use: Patient denies illicit drug use, tobacco use.  Patient denies ETOH use.  Patient notes she drinks 1 cup of coffee per day and 1 soda per day.    Psychiatric symptoms:  Depression:  Patient endorses symptoms of depressed mood, feeling bad about herself, feeling tired.  She denied suicidal ideation.  Queta/Hypomania:  Denied.  She denied periods of elevated mood or abnormally increased energy or goal-directed activity.  Anxiety: Patient endorses symptoms of nervousness, restlessness, increased worry, feeling overwhelmed.  Thoughts:  Denied delusions, hallucinations.  Suicidal thoughts/behaviors: Patient denied suicidal and homicidal ideation, plan and intent.  Patient noted agreement to call 911/and or present to the ED if she experienced suicidal or homicidal ideation, plan or intent.    Self-injury:  Patient denies.  Sleep: Patient notes she has been experiencing difficulty falling and staying asleep.    Mental Status:  Patient was alert, attentive and cooperative.  Patient was oriented x4.  Patient spoke with clear and intelligible speech.  Patient thought  processes were linear and goal directed.  Patient thought content was relevant.  No overt cognitive deficits were made evident.  Patient's mood was anxious and affect was nervous and tearful; congruent to mood.      PHQ9 6/10/2020   Total Score 17     GAD7 6/10/2020   1. Feeling nervous, anxious, or on edge? 3   2. Not being able to stop or control worrying? 2   3. Worrying too much about different things? 1   4. Trouble relaxing? 0   5. Being so restless that it is hard to sit still? 0   6. Becoming easily annoyed or irritable? 3   7. Feeling afraid as if something awful might happen? 2   DEMETRIUS-7 Score 11     Impressions:  Patient presents today endorsing symptoms of anxiety and depression which at this time appear primarily adjustment related; coping with COVID-19 pandemic.    Diagnosis: Unspecified Anxiety    Plan:  1.  Patient provided psychoeducation on anxiety and panic.  2.  Methods of management of episodes of exacerbated anxiety discussed; deep breathing/relaxation, change of setting, distraction.  3.  Patient to be provided resources for relaxation.  4.  Challenging anxious thinking to be discussed at upcoming appointments.  5.  Patient to follow-up in 2 weeks.    Length of Appointment: 44 minutes

## 2020-07-08 ENCOUNTER — PATIENT MESSAGE (OUTPATIENT)
Dept: FAMILY MEDICINE | Facility: CLINIC | Age: 35
End: 2020-07-08

## 2020-07-08 DIAGNOSIS — G89.29 CHRONIC NONINTRACTABLE HEADACHE, UNSPECIFIED HEADACHE TYPE: ICD-10-CM

## 2020-07-08 DIAGNOSIS — R51.9 CHRONIC NONINTRACTABLE HEADACHE, UNSPECIFIED HEADACHE TYPE: ICD-10-CM

## 2020-07-08 DIAGNOSIS — G43.801 OTHER MIGRAINE WITH STATUS MIGRAINOSUS, NOT INTRACTABLE: Primary | ICD-10-CM

## 2020-07-08 RX ORDER — UBROGEPANT 50 MG/1
50 TABLET ORAL ONCE AS NEEDED
Qty: 10 TABLET | Refills: 2 | Status: SHIPPED | OUTPATIENT
Start: 2020-07-08 | End: 2020-09-25 | Stop reason: SDUPTHER

## 2020-07-31 ENCOUNTER — TELEPHONE (OUTPATIENT)
Dept: PHARMACY | Facility: CLINIC | Age: 35
End: 2020-07-31

## 2020-07-31 NOTE — TELEPHONE ENCOUNTER
Good Afternoon,     The prior authorization for Lacey Jenkins Mondor's Ubrelvy 50mg prescription has been APPROVED FROM 7/22/2020 TO 1/21/2021 with copayment of $0.00.       Patient has been notified of the decision on 7/31/2020 and patient states she will  medication when she needs another refill.      If there are any additional questions or concerns, please contact me.    Thank You!   Nicole Raza CPhT, GLEN.A  Patient Care Advocate   [facility]  Phone: [phone] Ext [phone]  Fax: [phone]

## 2020-07-31 NOTE — TELEPHONE ENCOUNTER
The prior authorization for Lacey Jenkins Mondor's Ubrelvy has been submitted on 7/31/2020 @ 10:20am.      The patient obtained medication on 7/8/2020 with a co-pay card at no charge.    It can take up to 72 hours for a decision to be rendered from the insurance.      Patient is aware of PA and process.  Please let me know if you have any questions.    Thank You!   Nicole Raza CPhT, B.A  Patient Care Advocate   [facility]  Phone: [phone] Ext [phone]  Fax: [phone]

## 2020-09-25 DIAGNOSIS — G43.801 OTHER MIGRAINE WITH STATUS MIGRAINOSUS, NOT INTRACTABLE: ICD-10-CM

## 2020-09-25 RX ORDER — UBROGEPANT 50 MG/1
50 TABLET ORAL ONCE AS NEEDED
Qty: 10 TABLET | Refills: 2 | Status: SHIPPED | OUTPATIENT
Start: 2020-09-25 | End: 2020-09-25

## 2020-09-25 NOTE — TELEPHONE ENCOUNTER
Care Due:                  Date            Visit Type   Department     Provider  --------------------------------------------------------------------------------                                ESTABLISHED                              PATIENT      [facility] FAM[facility]  Last Visit: 04-      [facility]       JOHN MARIE                              MYCHART                              FOLLOWUP/OF  [facility]  Next Visit: 10-      [facility] VISIT   MEDICINE       JOHN MARIE                                                            Last  Test          Frequency    Reason                     Performed    Due Date  --------------------------------------------------------------------------------    HBA1C.......  6 months...  metFORMIN................  Not Found    Overdue    Powered by [employer]. Reference number: [id number]. 9/25/2020 10:32:07 AM   CDT

## 2020-09-28 ENCOUNTER — IMMUNIZATION (OUTPATIENT)
Dept: PHARMACY | Facility: CLINIC | Age: 35
End: 2020-09-28
Payer: COMMERCIAL

## 2020-10-05 ENCOUNTER — PATIENT MESSAGE (OUTPATIENT)
Dept: ADMINISTRATIVE | Facility: HOSPITAL | Age: 35
End: 2020-10-05

## 2020-10-06 ENCOUNTER — OFFICE VISIT (OUTPATIENT)
Dept: FAMILY MEDICINE | Facility: CLINIC | Age: 35
End: 2020-10-06
Payer: COMMERCIAL

## 2020-10-06 VITALS
WEIGHT: 162.69 LBS | OXYGEN SATURATION: 98 % | HEART RATE: 75 BPM | SYSTOLIC BLOOD PRESSURE: 132 MMHG | BODY MASS INDEX: 27.93 KG/M2 | DIASTOLIC BLOOD PRESSURE: 84 MMHG

## 2020-10-06 DIAGNOSIS — M25.50 PAIN IN JOINT INVOLVING MULTIPLE SITES: ICD-10-CM

## 2020-10-06 DIAGNOSIS — Z00.01 ANNUAL VISIT FOR GENERAL ADULT MEDICAL EXAMINATION WITH ABNORMAL FINDINGS: Primary | ICD-10-CM

## 2020-10-06 DIAGNOSIS — F41.9 ANXIETY: ICD-10-CM

## 2020-10-06 PROCEDURE — 99395 PREV VISIT EST AGE 18-39: CPT | Mod: S$GLB,,, | Performed by: FAMILY MEDICINE

## 2020-10-06 PROCEDURE — 99999 PR PBB SHADOW E&M-EST. PATIENT-LVL IV: CPT | Mod: PBBFAC,,, | Performed by: FAMILY MEDICINE

## 2020-10-06 PROCEDURE — 99395 PR PREVENTIVE VISIT,EST,18-39: ICD-10-PCS | Mod: S$GLB,,, | Performed by: FAMILY MEDICINE

## 2020-10-06 PROCEDURE — 99999 PR PBB SHADOW E&M-EST. PATIENT-LVL IV: ICD-10-PCS | Mod: PBBFAC,,, | Performed by: FAMILY MEDICINE

## 2020-10-06 RX ORDER — BUPROPION HYDROCHLORIDE 150 MG/1
300 TABLET ORAL DAILY
Qty: 180 TABLET | Refills: 3 | Status: SHIPPED | OUTPATIENT
Start: 2020-10-06 | End: 2021-06-29 | Stop reason: SDUPTHER

## 2020-10-06 NOTE — PATIENT INSTRUCTIONS
Eating Heart-Healthy Food: Using the DASH Plan    Eating for your heart doesnt have to be hard or boring. You just need to know how to make healthier choices. The DASH eating plan has been developed to help you do just that. DASH stands for Dietary Approaches to Stop Hypertension. It is a plan that has been proven to be healthier for your heart and to lower your risk for high blood pressure. It can also help lower your risk for cancer, heart disease, osteoporosis, and diabetes.  Choosing from each food group  Choose foods from each of the food groups below each day. Try to get the recommended number of servings for each food group. The serving numbers are based on a diet of 2,000 calories a day. Talk to your doctor if youre unsure about your calorie needs. Along with getting the correct servings, the DASH plan also recommends a sodium intake less than 2,300 mg per day.        Grains  Servings: 6 to 8 a day  A serving is:  · 1 slice bread  · 1 ounce dry cereal  · Half a cup cooked rice, pasta or cereal  Best choices: Whole grains and any grains high in fiber. Vegetables  Servings: 4 to 5 a day  A serving is:  · 1 cup raw leafy vegetable  · Half a cup cut-up raw or cooked vegetable  · Half a cup vegetable juice  Best choices: Fresh or frozen vegetables prepared without added salt or fat.   Fruits  Servings: 4 to 5 a day  A serving is:  · 1 medium fruit  · One-quarter cup dried fruit  · Half a cup fresh, frozen, or canned fruit  · Half a cup of 100% fruit juices  Best choices: A variety of fresh fruits of different colors. Whole fruits are a better choice than fruit juices. Low-fat or fat-free dairy  Servings: 2 to 3 a day  A serving is:  · 1 cup milk  · 1 cup yogurt  · One and a half ounces cheese  Best choices: Skim or 1% milk, low-fat or fat-free yogurt or buttermilk, and low-fat cheeses.         Lean meats, poultry, fish  Servings: 6 or fewer a day  A serving is:  · 1 ounce cooked meats, poultry, or fish  · 1  egg  Best choices: Lean poultry and fish. Trim away visible fat. Broil, grill, roast, or boil instead of frying. Remove skin from poultry before eating. Limit how much red meat you eat.  Nuts, seeds, beans  Servings: 4 to 5 a week  A serving is:  · One-third cup nuts (one and a half ounces)  · 2 tablespoons nut butter or seeds  · Half a cup cooked dry beans or legumes  Best choices: Dry roasted nuts with no salt added, lentils, kidney beans, garbanzo beans, and whole henderson beans.   Fats and oils  Servings: 2 to 3 a day  A serving is:  · 1 teaspoon vegetable oil  · 1 teaspoon soft margarine  · 1 tablespoon mayonnaise  · 2 tablespoons salad dressing  Best choices: Nut and vegetable oils (nontropical vegetable oils), such as olive and canola oil. Sweets  Servings: 5 a week or fewer  A serving is:  · 1 tablespoon sugar, maple syrup, or honey  · 1 tablespoon jam or jelly  · 1 half-ounce jelly beans (about 15)  · 1 cup lemonade  Best choices: Dried fruit can be a satisfying sweet. Choose low-fat sweets. And watch your serving sizes!      For more on the DASH eating plan, visit:  www.nhlbi.nih.gov/health/health-topics/topics/dash   Date Last Reviewed: 6/1/2016  © 3007-7432 [employer]. [address] [place] [address], [place] [address]. All rights reserved. This information is not intended as a substitute for professional medical care. Always follow your healthcare professional's instructions.

## 2020-10-06 NOTE — PROGRESS NOTES
Subjective:       Patient ID: Lacey Jenkins Mondor is a 35 y.o. female.    Chief Complaint: Annual Exam    HPI     The patient is coming here today for an annual checkup.  The patient stated that she is doing well, stated that the anxiety and depression are improved since she is taking Wellbutrin, notice improvement when she takes to Wellbutrin 150 but some side effects when she taking Wellbutrin 300 mg.  The patient also stated that is having some achiness on the left food and bilateral hands, the patient stated the symptoms are getting worse.  The patient denies any swelling or redness on the joints.  She is up-to-date with Pap smears, the last 1 was done 2 years ago with Dr. Phelps.    Past medical history, past social history was reviewed and discussed with the patient.    Review of Systems   Constitutional: Negative for activity change and unexpected weight change.   HENT: Negative for hearing loss, rhinorrhea and trouble swallowing.    Eyes: Negative for discharge and visual disturbance.   Respiratory: Negative for chest tightness and wheezing.    Cardiovascular: Positive for palpitations. Negative for chest pain.   Gastrointestinal: Positive for constipation. Negative for blood in stool and diarrhea.   Endocrine: Negative for polydipsia and polyuria.   Genitourinary: Negative for difficulty urinating, dysuria, hematuria and menstrual problem.   Musculoskeletal: Positive for arthralgias. Negative for joint swelling and neck pain.   Neurological: Positive for headaches. Negative for weakness.   Psychiatric/Behavioral: Negative for confusion and dysphoric mood.       Objective:      Physical Exam  Vitals signs and nursing note reviewed.   Constitutional:       General: She is not in acute distress.     Appearance: She is well-developed. She is not diaphoretic.   HENT:      Head: Normocephalic and atraumatic.      Right Ear: External ear normal.      Left Ear: External ear normal.      Nose: Nose normal.       Mouth/Throat:      Pharynx: No oropharyngeal exudate.   Eyes:      General:         Right eye: No discharge.         Left eye: No discharge.      Conjunctiva/sclera: Conjunctivae normal.   Neck:      Musculoskeletal: Neck supple.   Cardiovascular:      Rate and Rhythm: Normal rate and regular rhythm.      Heart sounds: Normal heart sounds. No murmur.   Pulmonary:      Effort: Pulmonary effort is normal. No respiratory distress.      Breath sounds: Normal breath sounds. No wheezing.   Abdominal:      General: There is no distension.      Palpations: There is no mass.      Tenderness: There is no abdominal tenderness.   Musculoskeletal:         General: No tenderness or deformity.   Skin:     Coloration: Skin is not pale.      Findings: No erythema.   Neurological:      Cranial Nerves: No cranial nerve deficit.      Coordination: Coordination normal.   Psychiatric:         Behavior: Behavior normal.         Thought Content: Thought content normal.         Judgment: Judgment normal.         Assessment:       1. Annual visit for general adult medical examination with abnormal findings    2. Pain in joint involving multiple sites        Plan:       Annual visit for general adult medical examination with abnormal findings  -     Comprehensive Metabolic Panel; Future; Expected date: 10/06/2020  -     Lipid Panel; Future; Expected date: 10/06/2020  -     CBC auto differential; Future; Expected date: 10/06/2020  -     TSH; Future; Expected date: 10/06/2020  -     Hepatitis C Antibody; Future; Expected date: 10/06/2020  -     HIV 1/2 Ag/Ab (4th Gen); Future; Expected date: 10/06/2020  -     Uric Acid; Future; Expected date: 10/06/2020  -     ROBERT Screen w/Reflex; Future; Expected date: 10/06/2020  -     Sedimentation rate; Future; Expected date: 10/06/2020  -     Rheumatoid Factor; Future; Expected date: 10/06/2020    Pain in joint involving multiple sites:  New problem workup needed  -     Uric Acid; Future; Expected date:  10/06/2020  -     ROBERT Screen w/Reflex; Future; Expected date: 10/06/2020  -     Sedimentation rate; Future; Expected date: 10/06/2020  -     Rheumatoid Factor; Future; Expected date: 10/06/2020    Anxiety:  Improved  -     buPROPion (WELLBUTRIN XL) 150 MG TB24 tablet; Take 2 tablets (300 mg total) by mouth once daily.  Dispense: 180 tablet; Refill: 3      Will continue with Wellbutrin, will call the patient after have the results of the test.  Continue healthy eating habits, continue weight loss.  The patient's BMI has been recorded in the chart. The patient has been provided educational materials regarding the benefits of attaining and maintaining a normal weight. We will continue to address and follow this issue during follow up visits.   Patient agreed with assessment and plan. Patient verbalized understanding.

## 2020-10-08 ENCOUNTER — LAB VISIT (OUTPATIENT)
Dept: LAB | Facility: HOSPITAL | Age: 35
End: 2020-10-08
Attending: FAMILY MEDICINE
Payer: COMMERCIAL

## 2020-10-08 DIAGNOSIS — M25.50 PAIN IN JOINT INVOLVING MULTIPLE SITES: ICD-10-CM

## 2020-10-08 DIAGNOSIS — Z00.01 ANNUAL VISIT FOR GENERAL ADULT MEDICAL EXAMINATION WITH ABNORMAL FINDINGS: ICD-10-CM

## 2020-10-08 LAB
ALBUMIN SERPL BCP-MCNC: 4.1 G/DL (ref 3.5–5.2)
ALP SERPL-CCNC: 60 U/L (ref 55–135)
ALT SERPL W/O P-5'-P-CCNC: 16 U/L (ref 10–44)
ANION GAP SERPL CALC-SCNC: 8 MMOL/L (ref 8–16)
AST SERPL-CCNC: 14 U/L (ref 10–40)
BASOPHILS # BLD AUTO: 0.06 K/UL (ref 0–0.2)
BASOPHILS NFR BLD: 0.9 % (ref 0–1.9)
BILIRUB SERPL-MCNC: 0.2 MG/DL (ref 0.1–1)
BUN SERPL-MCNC: 15 MG/DL (ref 6–20)
CALCIUM SERPL-MCNC: 8.7 MG/DL (ref 8.7–10.5)
CHLORIDE SERPL-SCNC: 111 MMOL/L (ref 95–110)
CHOLEST SERPL-MCNC: 174 MG/DL (ref 120–199)
CHOLEST/HDLC SERPL: 3.3 {RATIO} (ref 2–5)
CO2 SERPL-SCNC: 21 MMOL/L (ref 23–29)
CREAT SERPL-MCNC: 0.8 MG/DL (ref 0.5–1.4)
DIFFERENTIAL METHOD: ABNORMAL
EOSINOPHIL # BLD AUTO: 0.1 K/UL (ref 0–0.5)
EOSINOPHIL NFR BLD: 1.6 % (ref 0–8)
ERYTHROCYTE [DISTWIDTH] IN BLOOD BY AUTOMATED COUNT: 14 % (ref 11.5–14.5)
ERYTHROCYTE [SEDIMENTATION RATE] IN BLOOD BY WESTERGREN METHOD: 5 MM/HR (ref 0–20)
EST. GFR  (AFRICAN AMERICAN): >60 ML/MIN/1.73 M^2
EST. GFR  (NON AFRICAN AMERICAN): >60 ML/MIN/1.73 M^2
GLUCOSE SERPL-MCNC: 104 MG/DL (ref 70–110)
HCT VFR BLD AUTO: 41.4 % (ref 37–48.5)
HDLC SERPL-MCNC: 53 MG/DL (ref 40–75)
HDLC SERPL: 30.5 % (ref 20–50)
HGB BLD-MCNC: 12.9 G/DL (ref 12–16)
IMM GRANULOCYTES # BLD AUTO: 0.03 K/UL (ref 0–0.04)
IMM GRANULOCYTES NFR BLD AUTO: 0.4 % (ref 0–0.5)
LDLC SERPL CALC-MCNC: 111.4 MG/DL (ref 63–159)
LYMPHOCYTES # BLD AUTO: 2.5 K/UL (ref 1–4.8)
LYMPHOCYTES NFR BLD: 36 % (ref 18–48)
MCH RBC QN AUTO: 27.7 PG (ref 27–31)
MCHC RBC AUTO-ENTMCNC: 31.2 G/DL (ref 32–36)
MCV RBC AUTO: 89 FL (ref 82–98)
MONOCYTES # BLD AUTO: 0.4 K/UL (ref 0.3–1)
MONOCYTES NFR BLD: 5.8 % (ref 4–15)
NEUTROPHILS # BLD AUTO: 3.8 K/UL (ref 1.8–7.7)
NEUTROPHILS NFR BLD: 55.3 % (ref 38–73)
NONHDLC SERPL-MCNC: 121 MG/DL
NRBC BLD-RTO: 0 /100 WBC
PLATELET # BLD AUTO: 404 K/UL (ref 150–350)
PMV BLD AUTO: 9.5 FL (ref 9.2–12.9)
POTASSIUM SERPL-SCNC: 4.3 MMOL/L (ref 3.5–5.1)
PROT SERPL-MCNC: 6.9 G/DL (ref 6–8.4)
RBC # BLD AUTO: 4.66 M/UL (ref 4–5.4)
RHEUMATOID FACT SERPL-ACNC: <10 IU/ML (ref 0–15)
SODIUM SERPL-SCNC: 140 MMOL/L (ref 136–145)
TRIGL SERPL-MCNC: 48 MG/DL (ref 30–150)
TSH SERPL DL<=0.005 MIU/L-ACNC: 1.68 UIU/ML (ref 0.4–4)
URATE SERPL-MCNC: 4.9 MG/DL (ref 2.4–5.7)
WBC # BLD AUTO: 6.94 K/UL (ref 3.9–12.7)

## 2020-10-08 PROCEDURE — 86703 HIV-1/HIV-2 1 RESULT ANTBDY: CPT

## 2020-10-08 PROCEDURE — 86039 ANTINUCLEAR ANTIBODIES (ANA): CPT

## 2020-10-08 PROCEDURE — 80061 LIPID PANEL: CPT

## 2020-10-08 PROCEDURE — 85651 RBC SED RATE NONAUTOMATED: CPT | Mod: PO

## 2020-10-08 PROCEDURE — 84443 ASSAY THYROID STIM HORMONE: CPT

## 2020-10-08 PROCEDURE — 86038 ANTINUCLEAR ANTIBODIES: CPT

## 2020-10-08 PROCEDURE — 80053 COMPREHEN METABOLIC PANEL: CPT

## 2020-10-08 PROCEDURE — 36415 COLL VENOUS BLD VENIPUNCTURE: CPT | Mod: PO

## 2020-10-08 PROCEDURE — 86235 NUCLEAR ANTIGEN ANTIBODY: CPT | Mod: 59

## 2020-10-08 PROCEDURE — 85025 COMPLETE CBC W/AUTO DIFF WBC: CPT

## 2020-10-08 PROCEDURE — 86431 RHEUMATOID FACTOR QUANT: CPT

## 2020-10-08 PROCEDURE — 84550 ASSAY OF BLOOD/URIC ACID: CPT

## 2020-10-08 PROCEDURE — 86803 HEPATITIS C AB TEST: CPT

## 2020-10-09 LAB
ANA PATTERN 1: NORMAL
ANA SER QL IF: POSITIVE
ANA TITR SER IF: NORMAL {TITER}

## 2020-10-10 LAB
HCV AB SERPL QL IA: NEGATIVE
HIV 1+2 AB+HIV1 P24 AG SERPL QL IA: NEGATIVE

## 2020-10-12 DIAGNOSIS — M25.541 ARTHRALGIA OF BOTH HANDS: Primary | ICD-10-CM

## 2020-10-12 DIAGNOSIS — R76.8 ANA POSITIVE: ICD-10-CM

## 2020-10-12 DIAGNOSIS — M25.542 ARTHRALGIA OF BOTH HANDS: Primary | ICD-10-CM

## 2020-10-12 LAB
ANTI SM ANTIBODY: 0.06 RATIO (ref 0–0.99)
ANTI SM/RNP ANTIBODY: 0.05 RATIO (ref 0–0.99)
ANTI-SM INTERPRETATION: NEGATIVE
ANTI-SM/RNP INTERPRETATION: NEGATIVE
ANTI-SSA ANTIBODY: 0.04 RATIO (ref 0–0.99)
ANTI-SSA INTERPRETATION: NEGATIVE
ANTI-SSB ANTIBODY: 0.05 RATIO (ref 0–0.99)
ANTI-SSB INTERPRETATION: NEGATIVE
DSDNA AB SER-ACNC: NORMAL [IU]/ML

## 2020-10-14 ENCOUNTER — PATIENT MESSAGE (OUTPATIENT)
Dept: FAMILY MEDICINE | Facility: CLINIC | Age: 35
End: 2020-10-14

## 2020-10-14 ENCOUNTER — TELEPHONE (OUTPATIENT)
Dept: FAMILY MEDICINE | Facility: CLINIC | Age: 35
End: 2020-10-14

## 2020-10-14 DIAGNOSIS — M25.50 MULTIPLE JOINT PAIN: Primary | ICD-10-CM

## 2020-10-14 NOTE — TELEPHONE ENCOUNTER
"Rheumatology is fully booked until next July. So the Pt is  asking if you can order something called an "Arthritis Survey". Please advise  "

## 2020-10-16 ENCOUNTER — HOSPITAL ENCOUNTER (OUTPATIENT)
Dept: RADIOLOGY | Facility: HOSPITAL | Age: 35
Discharge: HOME | End: 2020-10-16
Attending: FAMILY MEDICINE
Payer: COMMERCIAL

## 2020-10-16 DIAGNOSIS — M25.50 MULTIPLE JOINT PAIN: ICD-10-CM

## 2020-10-16 PROCEDURE — 77077 JOINT SURVEY SINGLE VIEW: CPT | Mod: 26,,, | Performed by: RADIOLOGY

## 2020-10-16 PROCEDURE — 77077 JOINT SURVEY SINGLE VIEW: CPT | Mod: TC,PO

## 2020-10-16 PROCEDURE — 77077 XR ARTHRITIS SURVEY: ICD-10-PCS | Mod: 26,,, | Performed by: RADIOLOGY

## 2020-10-18 ENCOUNTER — PATIENT MESSAGE (OUTPATIENT)
Dept: FAMILY MEDICINE | Facility: CLINIC | Age: 35
End: 2020-10-18

## 2020-11-01 ENCOUNTER — PATIENT MESSAGE (OUTPATIENT)
Dept: FAMILY MEDICINE | Facility: CLINIC | Age: 35
End: 2020-11-01

## 2020-11-02 ENCOUNTER — OFFICE VISIT (OUTPATIENT)
Dept: OTOLARYNGOLOGY | Facility: CLINIC | Age: 35
End: 2020-11-02
Payer: COMMERCIAL

## 2020-11-02 ENCOUNTER — PATIENT MESSAGE (OUTPATIENT)
Dept: FAMILY MEDICINE | Facility: CLINIC | Age: 35
End: 2020-11-02

## 2020-11-02 VITALS — WEIGHT: 159.63 LBS | HEIGHT: 64 IN | BODY MASS INDEX: 27.25 KG/M2

## 2020-11-02 DIAGNOSIS — K21.9 GASTROESOPHAGEAL REFLUX DISEASE, UNSPECIFIED WHETHER ESOPHAGITIS PRESENT: ICD-10-CM

## 2020-11-02 DIAGNOSIS — J35.8 CHRONIC CRYPTITIS OF TONSIL: ICD-10-CM

## 2020-11-02 DIAGNOSIS — R51.9 SINUS HEADACHE: Primary | ICD-10-CM

## 2020-11-02 DIAGNOSIS — R13.10 PILL DYSPHAGIA: ICD-10-CM

## 2020-11-02 DIAGNOSIS — R09.A2 GLOBUS PHARYNGEUS: ICD-10-CM

## 2020-11-02 DIAGNOSIS — K08.89 PAIN IN A TOOTH OR TEETH: ICD-10-CM

## 2020-11-02 PROCEDURE — 99203 PR OFFICE/OUTPT VISIT, NEW, LEVL III, 30-44 MIN: ICD-10-PCS | Mod: 25,S$GLB,, | Performed by: NURSE PRACTITIONER

## 2020-11-02 PROCEDURE — 3008F BODY MASS INDEX DOCD: CPT | Mod: CPTII,S$GLB,, | Performed by: NURSE PRACTITIONER

## 2020-11-02 PROCEDURE — 31575 PR LARYNGOSCOPY, FLEXIBLE; DIAGNOSTIC: ICD-10-PCS | Mod: S$GLB,,, | Performed by: NURSE PRACTITIONER

## 2020-11-02 PROCEDURE — 31575 DIAGNOSTIC LARYNGOSCOPY: CPT | Mod: S$GLB,,, | Performed by: NURSE PRACTITIONER

## 2020-11-02 PROCEDURE — 99999 PR PBB SHADOW E&M-EST. PATIENT-LVL V: ICD-10-PCS | Mod: PBBFAC,,, | Performed by: NURSE PRACTITIONER

## 2020-11-02 PROCEDURE — 99999 PR PBB SHADOW E&M-EST. PATIENT-LVL V: CPT | Mod: PBBFAC,,, | Performed by: NURSE PRACTITIONER

## 2020-11-02 PROCEDURE — 99203 OFFICE O/P NEW LOW 30 MIN: CPT | Mod: 25,S$GLB,, | Performed by: NURSE PRACTITIONER

## 2020-11-02 PROCEDURE — 3008F PR BODY MASS INDEX (BMI) DOCUMENTED: ICD-10-PCS | Mod: CPTII,S$GLB,, | Performed by: NURSE PRACTITIONER

## 2020-11-02 RX ORDER — OMEPRAZOLE 40 MG/1
40 CAPSULE, DELAYED RELEASE ORAL
Qty: 30 CAPSULE | Refills: 11 | Status: ON HOLD | OUTPATIENT
Start: 2020-11-02 | End: 2021-09-13

## 2020-11-02 RX ORDER — FAMOTIDINE 40 MG/1
40 TABLET, FILM COATED ORAL NIGHTLY
Qty: 30 TABLET | Refills: 11 | Status: SHIPPED | OUTPATIENT
Start: 2020-11-02 | End: 2021-10-01

## 2020-11-02 RX ORDER — FLUCONAZOLE 150 MG/1
TABLET ORAL
Qty: 2 TABLET | Refills: 2 | Status: SHIPPED | OUTPATIENT
Start: 2020-11-02 | End: 2021-10-01 | Stop reason: ALTCHOICE

## 2020-11-02 NOTE — PROGRESS NOTES
Subjective:       Patient ID: Lacey Jenkins Mondor is a 35 y.o. female.    Chief Complaint: Other (swallowing issues )    HPI   Patient is new to ENT, self-referred for dysphagia. Patient reports globus sensation X months: feels like lump in the back of her throat, difficult swallowing pills and dried foods, sensation of excessive mucus in the back of her throat. Frequent throat discomfort. Lifelong h/o tonsil stones, gradually becoming larger and more difficult to extrude. Causing halitosis. Hyperactive gag reflex becoming worse, emesis with every teeth-brushing.     Review of Systems   Constitutional: Negative.  Negative for fever.   HENT: Positive for dental problem, postnasal drip, sneezing and trouble swallowing (especially vitamins). Negative for nasal congestion, facial swelling, rhinorrhea, sinus pressure/congestion, sore throat and voice change.         (+)Globus sensation: sensation of lump or swelling in the back of her throat, sensation of thick or too much mucus in the back of her throat, post-nasal drip sensation with absence of anterior nasosinal symptoms.   Frequent throat discomfort  Tonsil stones w/halitosis  Difficulty blowing nose and clearing   Eyes: Negative.  Negative for discharge, redness and itching.   Respiratory: Positive for choking. Negative for cough and shortness of breath.    Cardiovascular: Negative.    Gastrointestinal: Negative.    Musculoskeletal: Negative.    Integumentary:  Negative.   Neurological: Positive for headaches (migraines).   Hematological: Negative.    Psychiatric/Behavioral: Negative.          Objective:      Physical Exam  Vitals signs and nursing note reviewed.   Constitutional:       General: She is not in acute distress.     Appearance: She is well-developed. She is not ill-appearing or diaphoretic.   HENT:      Head: Normocephalic and atraumatic.      Right Ear: Hearing, tympanic membrane, ear canal and external ear normal. No middle ear effusion. Tympanic  membrane is not erythematous.      Left Ear: Hearing, tympanic membrane, ear canal and external ear normal.  No middle ear effusion. Tympanic membrane is not erythematous.      Nose: Nose normal. No mucosal edema or rhinorrhea.      Right Sinus: No maxillary sinus tenderness or frontal sinus tenderness.      Left Sinus: No maxillary sinus tenderness or frontal sinus tenderness.      Mouth/Throat:      Mouth: Mucous membranes are not pale, not dry and not cyanotic. No oral lesions.      Pharynx: Uvula midline. No oropharyngeal exudate or posterior oropharyngeal erythema.   Eyes:      General: Lids are normal. No scleral icterus.        Right eye: No discharge.         Left eye: No discharge.      Conjunctiva/sclera: Conjunctivae normal.      Pupils: Pupils are equal, round, and reactive to light.   Neck:      Musculoskeletal: Normal range of motion and neck supple.      Thyroid: No thyroid mass or thyromegaly.      Trachea: Trachea normal. No tracheal deviation.   Cardiovascular:      Rate and Rhythm: Normal rate.   Pulmonary:      Effort: Pulmonary effort is normal. No respiratory distress.      Breath sounds: No stridor. No wheezing.   Musculoskeletal: Normal range of motion.   Lymphadenopathy:      Head:      Right side of head: No submental, submandibular, tonsillar, preauricular or posterior auricular adenopathy.      Left side of head: No submental, submandibular, tonsillar, preauricular or posterior auricular adenopathy.      Cervical: No cervical adenopathy.      Right cervical: No superficial or posterior cervical adenopathy.     Left cervical: No superficial or posterior cervical adenopathy.   Skin:     General: Skin is warm and dry.      Coloration: Skin is not pale.      Findings: No lesion or rash.   Neurological:      Mental Status: She is alert and oriented to person, place, and time.      Coordination: Coordination normal.      Gait: Gait normal.   Psychiatric:         Speech: Speech normal.          Behavior: Behavior normal. Behavior is cooperative.         Thought Content: Thought content normal.         Judgment: Judgment normal.       Procedure: Flexible laryngoscopy    In order to fully examine the upper aerodigestive tract, including the larynx, in a patient with a hyperactive gag reflex, and suboptimal visualization with indirect mirror exam,  flexible endoscopy is required.   After explaining the procedure and obtaining verbal consent, a timeout was performed with the patient's participation according to the universal protocol. Both nasal cavities were anesthetized with 4% Xylocaine spray mixed with Rikki-Synephrine. The flexible laryngoscope  was inserted into the nasal cavity and advanced to visualize the nasal cavity, nasopharynx, the posterior oropharynx, hypopharynx, and the endolarynx with the  findings noted. The scope was removed and the procedure terminated. The patient tolerated this procedure well without apparent complication.     OVERALL FINDINGS  Nasopharynx - the torus is clear. There are no lesions of the posterior wall.   Oropharynx - no lesions of the tongue base. There is no obvious fullness or asymmetry.  Hypopharynx - there are no lesions of the pyriform sinuses or postcricoid region   Larynx - there are no lesions of the supraglottic or glottic larynx.  Vocal fold mobility is normal.     SPECIFIC FINDINGS  Adenoid tissue - normal   Nasopharynx & eustachian tube orifices - normal   Posterior pharyngeal wall - normal   Base of tongue - normal   Epiglottis - normal   Valleculae - normal   Pyriform sinuses - normal   False vocal cords - normal   True vocal cords - normal  Arytenoids - normal   Interarytenoid space - edema/thickening     Assessment:     Cryptic tonsils with h/o tonsilliths and halitosis    LPRD/GERD manifested as globus sensation and dysphagia  Sinus headaches and teeth pain -- r/o sinusitis w/imaging today  Plan:     Discussed tonsilliths. Curet given for at-home  extrusion    Advised/Cautioned: The results of today's ENT exam and flexible endoscopy were detailed to the patient and all questions were answered. Patient education centered around GERD, known exacerbants and contemporary treatment options. Laryngoscope video was reviewed with the patient in detail. Handouts given on LPRD and GERD were given to the patient. After review of these, patient elected to be placed on PPI 40 mg QAM on an empty stomach for the next 6-8 weeks, and H2-blocker QHS. I encouraged the patient once she has completed the evening meal to not snack or consume any other food products or caffeinated beverages for at least  minutes before retiring. Finally, I encouraged the patient to sleep about 30 degrees above horizontal, and this can be facilitated by using 2-3 pillows or a wedge foam product. If the patient is not demonstrably improved in 6-8 weeks, consultation with gastroenterology may be indicated to rule out intrinsic disease in the lower esophagus, stomach, or proximal duodenum.   Sinuses imaging today -- will call pt with results as soon as available.   Encouraged her to resume her nasal lavage kit

## 2020-11-02 NOTE — PATIENT INSTRUCTIONS
"TONSILLITHS (TONSIL STONES)    Tonsils are not smooth. They are full of crevices or small pits called "crypts."  Tonsilliths or tonsil stones are caused by an accumulation of bacteria and debris that become dislodged in the crypts of the tonsils.  This debris hardens and can be seen extruding from the tonsils.  The sulfa-producing bacteria within the tonsilliths are the cause of the bad breath.  People who chronically suffer from tonsilliths have devised various ways to manually extrude them by using tongue depressors or toothbrushes to push the stones out or a waterpick to flush the stones out.  The only definitive way to eliminate the formation of the tonsilliths is to have the tonsils removed, which is not a trivial undertaking.     The way to differentiate between a tonsil stone and a "pus pocket" is: when it is a pus pocket, the entire tonsillar area (back of mouth, uvula, palate, etc.) will be extremely red and inflamed. There is also usually lymph node enlargement and fever present when it is a pus pocket.         Nasal saline rinse kit (use Neti pot or [employer]Share sinus rinse kit) -- Rinse your sinuses once to twice daily to reduce the allergen burden in your nose. Use sterile water (boiled tap water which has cooled) or distilled bottled water. Add 1/4 teaspoon sea salt and a pinch of baking soda or a mixture packet from the maker of your sinus rinse kit.  Rinse through both sides of nose to cleanse sinus and nasal passages, bending forward with head tilted down. Keep your mouth open, without holding your breath. Squeeze bottle gently until solution starts draining from the opposite nasal passage. After bottle is empty, blow nose very gently, without pinching nose completely, to avoid pressure on eardrums.  There are useful YouTube videos that show demonstration of how to do these properly.     Ponaris Nasal Emollient is used for the relief of: nasal congestion due to colds, nasal irritation, allergy " "exacerbations, nasal crusting. Specifically prepared iodized organic oils of pine, eucalyptus, peppermint, cajeput, and cottonseed. To order Ponaris: ask your pharmacist to order it for you or we carry it in our pharmacy downstairs on the first floor.         How Acid Reflux Affects Your Throat  Do you have to clear your throat or cough often? Are you hoarse? Do you have trouble swallowing? If you have these or other throat symptoms, you may have acid reflux. This occurs when stomach acid flows back up and irritates your throat.  Why you have throat symptoms  There are muscles (esophageal sphincters) at both ends of the tube that carries food to your stomach (the esophagus). These muscles relax to let food pass. Then they tighten to keep stomach acid down. When the lower esophageal sphincter (LES) doesnt tighten enough, acid can flow back (reflux) from your stomach into your esophagus. This may cause heartburn. In some cases the upper esophageal sphincter (UES) also doesnt work well. Then acid can travel higher and enter your throat (pharynx). In many cases, this causes throat symptoms.  Common throat symptoms  · Need to clear your throat often  · Feeling like youre choking  · Long-term (chronic) cough  · Hoarseness  · Trouble swallowing  · Feel like you have a lump in your throat  · Sour or acid taste  · Sore throat that keeps coming back   LARYNGOPHARYNGEAL REFLUX  (SILENT OR ATYPICAL REFLUX)    If you have any of the following symptoms you may have laryngopharyngeal reflux (LPR):  hoarseness, thick or too much mucus, chronic throat pain/irritation, chronic throat clearing, chronic cough, especially cough that wake you up from sleep, chronic "postnasal drip" without the need to blow your nose.     Many people with LPR do not have symptoms of heartburn. Compared to the esophagus, the voice box and the back of the throat are significantly more sensitive to the effects of acid on surrounding tissue. Acid passing " "quickly through the esophagus does not have a chance to irritate the area for too long.  However acid that pools in the throat or voice box can cause prolonged irritation resulting in the symptoms of LPR. In patients known to have LPR, 71% reported hoarseness, 51% reported chronic cough, 47% reported sensation of thickness or lump in the back of the throat, 42% reported chronic throat clearing, and 35% reported trouble swallowing.     Another major symptom of LPR is "postnasal drip."  Patients are often told symptoms are due to abnormal nasal drainage or sinus infection; however this is rarely the cause of chronic throat irritation. For post nasal drip to cause the complaints described, signs and symptoms of an active nasal infection should be present.     Treatments for LPR include:  postural changes, weight reduction, diet modification, medication to reduce stomach acid and promote normal motility, and surgery to prevent reflux. Most patients will begin to notice some relief in her symptoms about 2 weeks after starting the medication; however it is generally recommended the medication should be continued for 2 months. If the symptoms completely resolve, the medication can then be tapered.  Some people will remain symptom free while others may have relapses which required treatment again.    Things you can do to prevent reflux include:  Do not smoke.  Smoking will cause reflux.  Avoid tight fitting clothes or belts around the waist.  Avoid vigorous exercise at least 2 hours before bedtime. Avoid eating at least 2 hours prior to bedtime.  In fact avoid eating your largest meal at night.  Weight loss.  For patient's with recent weight gain, shedding a few pounds is all that is required to improve reflux.  Avoid caffeine, cola beverages, citrus beverages, mints, alcoholic beverages, particularly at night, cheese, fried foods, spicy foods, eggs, and chocolate.  Sleep with the head of bed elevated at least 6 inches " "("MedCline" wedge pillow).    Recommendations:    Take Nexium or Prilosec (PPI) twice daily on an empty stomach (30-60 minutes before eating) at 40 MG.   After 4-8 weeks, with significant symptomatic improvement, you may begin weaning your reflux medications down:  Nexium or Prilosec 40 mg --> to 20 mg (over-the-counter strength).  Continue to wean as symptoms allow.    See a Gastro doctor (GI) for refractory symptoms and continued management.    "

## 2020-12-22 ENCOUNTER — IMMUNIZATION (OUTPATIENT)
Dept: FAMILY MEDICINE | Facility: CLINIC | Age: 35
End: 2020-12-22
Payer: COMMERCIAL

## 2020-12-22 DIAGNOSIS — Z23 NEED FOR VACCINATION: ICD-10-CM

## 2020-12-22 PROCEDURE — 91300 COVID-19, MRNA, LNP-S, PF, 30 MCG/0.3 ML DOSE VACCINE: ICD-10-PCS | Mod: ,,, | Performed by: FAMILY MEDICINE

## 2020-12-22 PROCEDURE — 0001A COVID-19, MRNA, LNP-S, PF, 30 MCG/0.3 ML DOSE VACCINE: CPT | Mod: CV19,,, | Performed by: FAMILY MEDICINE

## 2020-12-22 PROCEDURE — 0001A COVID-19, MRNA, LNP-S, PF, 30 MCG/0.3 ML DOSE VACCINE: ICD-10-PCS | Mod: CV19,,, | Performed by: FAMILY MEDICINE

## 2020-12-22 PROCEDURE — 91300 COVID-19, MRNA, LNP-S, PF, 30 MCG/0.3 ML DOSE VACCINE: CPT | Mod: ,,, | Performed by: FAMILY MEDICINE

## 2020-12-23 ENCOUNTER — PATIENT MESSAGE (OUTPATIENT)
Dept: FAMILY MEDICINE | Facility: CLINIC | Age: 35
End: 2020-12-23

## 2020-12-24 RX ORDER — TIZANIDINE 4 MG/1
4 TABLET ORAL EVERY 6 HOURS PRN
Qty: 30 TABLET | Refills: 0 | Status: SHIPPED | OUTPATIENT
Start: 2020-12-24 | End: 2021-01-06

## 2021-01-04 ENCOUNTER — PATIENT MESSAGE (OUTPATIENT)
Dept: ADMINISTRATIVE | Facility: HOSPITAL | Age: 36
End: 2021-01-04

## 2021-01-12 ENCOUNTER — IMMUNIZATION (OUTPATIENT)
Dept: FAMILY MEDICINE | Facility: CLINIC | Age: 36
End: 2021-01-12
Payer: COMMERCIAL

## 2021-01-12 DIAGNOSIS — Z23 NEED FOR VACCINATION: ICD-10-CM

## 2021-01-12 PROCEDURE — 91300 COVID-19, MRNA, LNP-S, PF, 30 MCG/0.3 ML DOSE VACCINE: CPT | Mod: PBBFAC | Performed by: FAMILY MEDICINE

## 2021-01-12 PROCEDURE — 0002A COVID-19, MRNA, LNP-S, PF, 30 MCG/0.3 ML DOSE VACCINE: CPT | Mod: PBBFAC | Performed by: FAMILY MEDICINE

## 2021-02-04 ENCOUNTER — OFFICE VISIT (OUTPATIENT)
Dept: OBSTETRICS AND GYNECOLOGY | Facility: CLINIC | Age: 36
End: 2021-02-04
Payer: COMMERCIAL

## 2021-02-04 VITALS
WEIGHT: 159.38 LBS | SYSTOLIC BLOOD PRESSURE: 124 MMHG | HEIGHT: 64 IN | DIASTOLIC BLOOD PRESSURE: 72 MMHG | BODY MASS INDEX: 27.21 KG/M2

## 2021-02-04 DIAGNOSIS — Z11.3 SCREEN FOR STD (SEXUALLY TRANSMITTED DISEASE): ICD-10-CM

## 2021-02-04 DIAGNOSIS — Z12.4 PAP SMEAR FOR CERVICAL CANCER SCREENING: ICD-10-CM

## 2021-02-04 DIAGNOSIS — Z34.90 EARLY STAGE OF PREGNANCY: ICD-10-CM

## 2021-02-04 DIAGNOSIS — N91.0 DELAYED MENSES: Primary | ICD-10-CM

## 2021-02-04 LAB
B-HCG UR QL: POSITIVE
CTP QC/QA: YES

## 2021-02-04 PROCEDURE — 99999 PR PBB SHADOW E&M-EST. PATIENT-LVL V: CPT | Mod: PBBFAC,,, | Performed by: SPECIALIST

## 2021-02-04 PROCEDURE — 3008F BODY MASS INDEX DOCD: CPT | Mod: CPTII,S$GLB,, | Performed by: SPECIALIST

## 2021-02-04 PROCEDURE — 99203 PR OFFICE/OUTPT VISIT, NEW, LEVL III, 30-44 MIN: ICD-10-PCS | Mod: S$GLB,,, | Performed by: SPECIALIST

## 2021-02-04 PROCEDURE — 99999 PR PBB SHADOW E&M-EST. PATIENT-LVL V: ICD-10-PCS | Mod: PBBFAC,,, | Performed by: SPECIALIST

## 2021-02-04 PROCEDURE — 1126F AMNT PAIN NOTED NONE PRSNT: CPT | Mod: S$GLB,,, | Performed by: SPECIALIST

## 2021-02-04 PROCEDURE — 87491 CHLMYD TRACH DNA AMP PROBE: CPT

## 2021-02-04 PROCEDURE — 1126F PR PAIN SEVERITY QUANTIFIED, NO PAIN PRESENT: ICD-10-PCS | Mod: S$GLB,,, | Performed by: SPECIALIST

## 2021-02-04 PROCEDURE — 87591 N.GONORRHOEAE DNA AMP PROB: CPT

## 2021-02-04 PROCEDURE — 88175 CYTOPATH C/V AUTO FLUID REDO: CPT

## 2021-02-04 PROCEDURE — 3008F PR BODY MASS INDEX (BMI) DOCUMENTED: ICD-10-PCS | Mod: CPTII,S$GLB,, | Performed by: SPECIALIST

## 2021-02-04 PROCEDURE — 99203 OFFICE O/P NEW LOW 30 MIN: CPT | Mod: S$GLB,,, | Performed by: SPECIALIST

## 2021-02-06 LAB
C TRACH DNA SPEC QL NAA+PROBE: NOT DETECTED
N GONORRHOEA DNA SPEC QL NAA+PROBE: NOT DETECTED

## 2021-02-11 ENCOUNTER — LAB VISIT (OUTPATIENT)
Dept: LAB | Facility: HOSPITAL | Age: 36
End: 2021-02-11
Attending: SPECIALIST
Payer: COMMERCIAL

## 2021-02-11 ENCOUNTER — PATIENT MESSAGE (OUTPATIENT)
Dept: OBSTETRICS AND GYNECOLOGY | Facility: CLINIC | Age: 36
End: 2021-02-11

## 2021-02-11 DIAGNOSIS — Z34.90 EARLY STAGE OF PREGNANCY: ICD-10-CM

## 2021-02-11 LAB
BASOPHILS # BLD AUTO: 0.05 K/UL (ref 0–0.2)
BASOPHILS NFR BLD: 0.6 % (ref 0–1.9)
DIFFERENTIAL METHOD: ABNORMAL
EOSINOPHIL # BLD AUTO: 0.1 K/UL (ref 0–0.5)
EOSINOPHIL NFR BLD: 1 % (ref 0–8)
ERYTHROCYTE [DISTWIDTH] IN BLOOD BY AUTOMATED COUNT: 14 % (ref 11.5–14.5)
HCG INTACT+B SERPL-ACNC: NORMAL MIU/ML
HCT VFR BLD AUTO: 38.6 % (ref 37–48.5)
HGB BLD-MCNC: 12.7 G/DL (ref 12–16)
IMM GRANULOCYTES # BLD AUTO: 0.03 K/UL (ref 0–0.04)
IMM GRANULOCYTES NFR BLD AUTO: 0.3 % (ref 0–0.5)
LYMPHOCYTES # BLD AUTO: 2.6 K/UL (ref 1–4.8)
LYMPHOCYTES NFR BLD: 29.4 % (ref 18–48)
MCH RBC QN AUTO: 28.9 PG (ref 27–31)
MCHC RBC AUTO-ENTMCNC: 32.9 G/DL (ref 32–36)
MCV RBC AUTO: 88 FL (ref 82–98)
MONOCYTES # BLD AUTO: 0.6 K/UL (ref 0.3–1)
MONOCYTES NFR BLD: 6.4 % (ref 4–15)
NEUTROPHILS # BLD AUTO: 5.5 K/UL (ref 1.8–7.7)
NEUTROPHILS NFR BLD: 62.3 % (ref 38–73)
NRBC BLD-RTO: 0 /100 WBC
PLATELET # BLD AUTO: 355 K/UL (ref 150–350)
PMV BLD AUTO: 9.7 FL (ref 9.2–12.9)
RBC # BLD AUTO: 4.4 M/UL (ref 4–5.4)
TSH SERPL DL<=0.005 MIU/L-ACNC: 2.5 UIU/ML (ref 0.4–4)
WBC # BLD AUTO: 8.75 K/UL (ref 3.9–12.7)

## 2021-02-11 PROCEDURE — 84702 CHORIONIC GONADOTROPIN TEST: CPT

## 2021-02-11 PROCEDURE — 87340 HEPATITIS B SURFACE AG IA: CPT

## 2021-02-11 PROCEDURE — 86762 RUBELLA ANTIBODY: CPT

## 2021-02-11 PROCEDURE — 36415 COLL VENOUS BLD VENIPUNCTURE: CPT | Mod: PO

## 2021-02-11 PROCEDURE — 86900 BLOOD TYPING SEROLOGIC ABO: CPT

## 2021-02-11 PROCEDURE — 86703 HIV-1/HIV-2 1 RESULT ANTBDY: CPT

## 2021-02-11 PROCEDURE — 86592 SYPHILIS TEST NON-TREP QUAL: CPT

## 2021-02-11 PROCEDURE — 85025 COMPLETE CBC W/AUTO DIFF WBC: CPT

## 2021-02-11 PROCEDURE — 84443 ASSAY THYROID STIM HORMONE: CPT

## 2021-02-12 LAB
ABO + RH BLD: NORMAL
BLD GP AB SCN CELLS X3 SERPL QL: NORMAL
HBV SURFACE AG SERPL QL IA: NEGATIVE
HIV 1+2 AB+HIV1 P24 AG SERPL QL IA: NEGATIVE
RUBV IGG SER-ACNC: 11.3 IU/ML
RUBV IGG SER-IMP: REACTIVE

## 2021-02-15 LAB — RPR SER QL: NORMAL

## 2021-02-22 LAB
FINAL PATHOLOGIC DIAGNOSIS: NORMAL
Lab: NORMAL

## 2021-05-19 ENCOUNTER — CLINICAL SUPPORT (OUTPATIENT)
Dept: URGENT CARE | Facility: CLINIC | Age: 36
End: 2021-05-19
Payer: COMMERCIAL

## 2021-05-19 DIAGNOSIS — R05.9 COUGH: Primary | ICD-10-CM

## 2021-05-19 DIAGNOSIS — R05.9 COUGH: ICD-10-CM

## 2021-05-19 LAB
CTP QC/QA: YES
SARS-COV-2 RDRP RESP QL NAA+PROBE: NEGATIVE

## 2021-05-19 PROCEDURE — U0002 COVID-19 LAB TEST NON-CDC: HCPCS | Mod: QW,S$GLB,, | Performed by: PREVENTIVE MEDICINE

## 2021-05-19 PROCEDURE — U0002: ICD-10-PCS | Mod: QW,S$GLB,, | Performed by: PREVENTIVE MEDICINE

## 2021-06-16 ENCOUNTER — IMMUNIZATION (OUTPATIENT)
Dept: PHARMACY | Facility: CLINIC | Age: 36
End: 2021-06-16
Payer: COMMERCIAL

## 2021-06-29 DIAGNOSIS — F41.9 ANXIETY: ICD-10-CM

## 2021-06-29 RX ORDER — BUPROPION HYDROCHLORIDE 150 MG/1
300 TABLET ORAL DAILY
Qty: 180 TABLET | Refills: 3 | Status: SHIPPED | OUTPATIENT
Start: 2021-06-29 | End: 2021-12-06

## 2021-08-12 ENCOUNTER — OFFICE VISIT (OUTPATIENT)
Dept: SURGERY | Facility: CLINIC | Age: 36
End: 2021-08-12
Payer: COMMERCIAL

## 2021-08-12 VITALS — HEIGHT: 64 IN | WEIGHT: 178 LBS | BODY MASS INDEX: 30.39 KG/M2 | TEMPERATURE: 98 F

## 2021-08-12 DIAGNOSIS — O22.43 HEMORRHOIDS DURING PREGNANCY IN THIRD TRIMESTER: Primary | ICD-10-CM

## 2021-08-12 PROCEDURE — 3008F PR BODY MASS INDEX (BMI) DOCUMENTED: ICD-10-PCS | Mod: CPTII,S$GLB,, | Performed by: SURGERY

## 2021-08-12 PROCEDURE — 1159F PR MEDICATION LIST DOCUMENTED IN MEDICAL RECORD: ICD-10-PCS | Mod: CPTII,S$GLB,, | Performed by: SURGERY

## 2021-08-12 PROCEDURE — 99999 PR PBB SHADOW E&M-EST. PATIENT-LVL IV: CPT | Mod: PBBFAC,,, | Performed by: SURGERY

## 2021-08-12 PROCEDURE — 99213 OFFICE O/P EST LOW 20 MIN: CPT | Mod: S$GLB,,, | Performed by: SURGERY

## 2021-08-12 PROCEDURE — 99999 PR PBB SHADOW E&M-EST. PATIENT-LVL IV: ICD-10-PCS | Mod: PBBFAC,,, | Performed by: SURGERY

## 2021-08-12 PROCEDURE — 1125F AMNT PAIN NOTED PAIN PRSNT: CPT | Mod: CPTII,S$GLB,, | Performed by: SURGERY

## 2021-08-12 PROCEDURE — 1159F MED LIST DOCD IN RCRD: CPT | Mod: CPTII,S$GLB,, | Performed by: SURGERY

## 2021-08-12 PROCEDURE — 99213 PR OFFICE/OUTPT VISIT, EST, LEVL III, 20-29 MIN: ICD-10-PCS | Mod: S$GLB,,, | Performed by: SURGERY

## 2021-08-12 PROCEDURE — 1125F PR PAIN SEVERITY QUANTIFIED, PAIN PRESENT: ICD-10-PCS | Mod: CPTII,S$GLB,, | Performed by: SURGERY

## 2021-08-12 PROCEDURE — 3008F BODY MASS INDEX DOCD: CPT | Mod: CPTII,S$GLB,, | Performed by: SURGERY

## 2021-08-12 RX ORDER — LIDOCAINE 50 MG/G
OINTMENT TOPICAL
Qty: 50 G | Refills: 1 | Status: SHIPPED | OUTPATIENT
Start: 2021-08-12 | End: 2021-10-01 | Stop reason: ALTCHOICE

## 2021-08-13 ENCOUNTER — LAB VISIT (OUTPATIENT)
Dept: FAMILY MEDICINE | Facility: CLINIC | Age: 36
End: 2021-08-13
Payer: COMMERCIAL

## 2021-08-13 DIAGNOSIS — Z20.822 EXPOSURE TO COVID-19 VIRUS: Primary | ICD-10-CM

## 2021-08-13 PROCEDURE — U0005 INFEC AGEN DETEC AMPLI PROBE: HCPCS | Performed by: FAMILY MEDICINE

## 2021-08-13 PROCEDURE — U0003 INFECTIOUS AGENT DETECTION BY NUCLEIC ACID (DNA OR RNA); SEVERE ACUTE RESPIRATORY SYNDROME CORONAVIRUS 2 (SARS-COV-2) (CORONAVIRUS DISEASE [COVID-19]), AMPLIFIED PROBE TECHNIQUE, MAKING USE OF HIGH THROUGHPUT TECHNOLOGIES AS DESCRIBED BY CMS-2020-01-R: HCPCS | Performed by: FAMILY MEDICINE

## 2021-08-15 LAB
SARS-COV-2 RNA RESP QL NAA+PROBE: NOT DETECTED
SARS-COV-2- CYCLE NUMBER: -1

## 2021-08-16 ENCOUNTER — PATIENT MESSAGE (OUTPATIENT)
Dept: FAMILY MEDICINE | Facility: CLINIC | Age: 36
End: 2021-08-16

## 2021-09-13 PROBLEM — O16.3 HTN COMPLICATING PERIPREGNANCY, ANTEPARTUM, THIRD TRIMESTER: Status: ACTIVE | Noted: 2021-09-13

## 2021-09-17 ENCOUNTER — PATIENT MESSAGE (OUTPATIENT)
Dept: FAMILY MEDICINE | Facility: CLINIC | Age: 36
End: 2021-09-17

## 2021-09-17 DIAGNOSIS — D50.8 OTHER IRON DEFICIENCY ANEMIA: Primary | ICD-10-CM

## 2021-10-01 ENCOUNTER — LAB VISIT (OUTPATIENT)
Dept: LAB | Facility: HOSPITAL | Age: 36
End: 2021-10-01
Attending: INTERNAL MEDICINE
Payer: COMMERCIAL

## 2021-10-01 ENCOUNTER — OFFICE VISIT (OUTPATIENT)
Dept: HEMATOLOGY/ONCOLOGY | Facility: CLINIC | Age: 36
End: 2021-10-01
Payer: COMMERCIAL

## 2021-10-01 ENCOUNTER — PATIENT MESSAGE (OUTPATIENT)
Dept: HEMATOLOGY/ONCOLOGY | Facility: CLINIC | Age: 36
End: 2021-10-01

## 2021-10-01 VITALS
TEMPERATURE: 99 F | HEIGHT: 63 IN | DIASTOLIC BLOOD PRESSURE: 80 MMHG | OXYGEN SATURATION: 98 % | SYSTOLIC BLOOD PRESSURE: 136 MMHG | BODY MASS INDEX: 28.17 KG/M2 | HEART RATE: 90 BPM | WEIGHT: 159 LBS

## 2021-10-01 DIAGNOSIS — D50.8 OTHER IRON DEFICIENCY ANEMIA: ICD-10-CM

## 2021-10-01 LAB
ALBUMIN SERPL BCP-MCNC: 3.7 G/DL (ref 3.5–5.2)
ALP SERPL-CCNC: 117 U/L (ref 55–135)
ALT SERPL W/O P-5'-P-CCNC: 11 U/L (ref 10–44)
ANION GAP SERPL CALC-SCNC: 9 MMOL/L (ref 8–16)
AST SERPL-CCNC: 14 U/L (ref 10–40)
BASOPHILS # BLD AUTO: 0.06 K/UL (ref 0–0.2)
BASOPHILS NFR BLD: 0.8 % (ref 0–1.9)
BILIRUB SERPL-MCNC: 0.3 MG/DL (ref 0.1–1)
BUN SERPL-MCNC: 12 MG/DL (ref 6–20)
CALCIUM SERPL-MCNC: 9.2 MG/DL (ref 8.7–10.5)
CHLORIDE SERPL-SCNC: 107 MMOL/L (ref 95–110)
CO2 SERPL-SCNC: 24 MMOL/L (ref 23–29)
CREAT SERPL-MCNC: 0.8 MG/DL (ref 0.5–1.4)
DIFFERENTIAL METHOD: ABNORMAL
EOSINOPHIL # BLD AUTO: 0.2 K/UL (ref 0–0.5)
EOSINOPHIL NFR BLD: 2.2 % (ref 0–8)
ERYTHROCYTE [DISTWIDTH] IN BLOOD BY AUTOMATED COUNT: 17.4 % (ref 11.5–14.5)
EST. GFR  (AFRICAN AMERICAN): >60 ML/MIN/1.73 M^2
EST. GFR  (NON AFRICAN AMERICAN): >60 ML/MIN/1.73 M^2
FERRITIN SERPL-MCNC: 14 NG/ML (ref 20–300)
GLUCOSE SERPL-MCNC: 94 MG/DL (ref 70–110)
HCT VFR BLD AUTO: 32.6 % (ref 37–48.5)
HGB BLD-MCNC: 9.8 G/DL (ref 12–16)
IMM GRANULOCYTES # BLD AUTO: 0.01 K/UL (ref 0–0.04)
IMM GRANULOCYTES NFR BLD AUTO: 0.1 % (ref 0–0.5)
IRON SERPL-MCNC: 279 UG/DL (ref 30–160)
LYMPHOCYTES # BLD AUTO: 2.8 K/UL (ref 1–4.8)
LYMPHOCYTES NFR BLD: 39 % (ref 18–48)
MCH RBC QN AUTO: 24.4 PG (ref 27–31)
MCHC RBC AUTO-ENTMCNC: 30.1 G/DL (ref 32–36)
MCV RBC AUTO: 81 FL (ref 82–98)
MONOCYTES # BLD AUTO: 0.4 K/UL (ref 0.3–1)
MONOCYTES NFR BLD: 5.7 % (ref 4–15)
NEUTROPHILS # BLD AUTO: 3.8 K/UL (ref 1.8–7.7)
NEUTROPHILS NFR BLD: 52.2 % (ref 38–73)
NRBC BLD-RTO: 0 /100 WBC
PLATELET # BLD AUTO: 527 K/UL (ref 150–450)
PMV BLD AUTO: 8.3 FL (ref 9.2–12.9)
POTASSIUM SERPL-SCNC: 4.4 MMOL/L (ref 3.5–5.1)
PROT SERPL-MCNC: 7 G/DL (ref 6–8.4)
RBC # BLD AUTO: 4.01 M/UL (ref 4–5.4)
SATURATED IRON: 56 % (ref 20–50)
SODIUM SERPL-SCNC: 140 MMOL/L (ref 136–145)
TOTAL IRON BINDING CAPACITY: 494 UG/DL (ref 250–450)
TRANSFERRIN SERPL-MCNC: 334 MG/DL (ref 200–375)
WBC # BLD AUTO: 7.23 K/UL (ref 3.9–12.7)

## 2021-10-01 PROCEDURE — 84466 ASSAY OF TRANSFERRIN: CPT | Performed by: INTERNAL MEDICINE

## 2021-10-01 PROCEDURE — 3079F PR MOST RECENT DIASTOLIC BLOOD PRESSURE 80-89 MM HG: ICD-10-PCS | Mod: CPTII,S$GLB,, | Performed by: INTERNAL MEDICINE

## 2021-10-01 PROCEDURE — 82728 ASSAY OF FERRITIN: CPT | Performed by: INTERNAL MEDICINE

## 2021-10-01 PROCEDURE — 80053 COMPREHEN METABOLIC PANEL: CPT | Mod: PN | Performed by: INTERNAL MEDICINE

## 2021-10-01 PROCEDURE — 99203 PR OFFICE/OUTPT VISIT, NEW, LEVL III, 30-44 MIN: ICD-10-PCS | Mod: S$GLB,,, | Performed by: INTERNAL MEDICINE

## 2021-10-01 PROCEDURE — 3075F PR MOST RECENT SYSTOLIC BLOOD PRESS GE 130-139MM HG: ICD-10-PCS | Mod: CPTII,S$GLB,, | Performed by: INTERNAL MEDICINE

## 2021-10-01 PROCEDURE — 3079F DIAST BP 80-89 MM HG: CPT | Mod: CPTII,S$GLB,, | Performed by: INTERNAL MEDICINE

## 2021-10-01 PROCEDURE — 1159F MED LIST DOCD IN RCRD: CPT | Mod: CPTII,S$GLB,, | Performed by: INTERNAL MEDICINE

## 2021-10-01 PROCEDURE — 99203 OFFICE O/P NEW LOW 30 MIN: CPT | Mod: S$GLB,,, | Performed by: INTERNAL MEDICINE

## 2021-10-01 PROCEDURE — 99999 PR PBB SHADOW E&M-EST. PATIENT-LVL IV: ICD-10-PCS | Mod: PBBFAC,,, | Performed by: INTERNAL MEDICINE

## 2021-10-01 PROCEDURE — 1159F PR MEDICATION LIST DOCUMENTED IN MEDICAL RECORD: ICD-10-PCS | Mod: CPTII,S$GLB,, | Performed by: INTERNAL MEDICINE

## 2021-10-01 PROCEDURE — 99999 PR PBB SHADOW E&M-EST. PATIENT-LVL IV: CPT | Mod: PBBFAC,,, | Performed by: INTERNAL MEDICINE

## 2021-10-01 PROCEDURE — 3008F BODY MASS INDEX DOCD: CPT | Mod: CPTII,S$GLB,, | Performed by: INTERNAL MEDICINE

## 2021-10-01 PROCEDURE — 1111F PR DISCHARGE MEDS RECONCILED W/ CURRENT OUTPATIENT MED LIST: ICD-10-PCS | Mod: CPTII,S$GLB,, | Performed by: INTERNAL MEDICINE

## 2021-10-01 PROCEDURE — 85025 COMPLETE CBC W/AUTO DIFF WBC: CPT | Mod: PN | Performed by: INTERNAL MEDICINE

## 2021-10-01 PROCEDURE — 1111F DSCHRG MED/CURRENT MED MERGE: CPT | Mod: CPTII,S$GLB,, | Performed by: INTERNAL MEDICINE

## 2021-10-01 PROCEDURE — 36415 COLL VENOUS BLD VENIPUNCTURE: CPT | Mod: PN | Performed by: INTERNAL MEDICINE

## 2021-10-01 PROCEDURE — 3008F PR BODY MASS INDEX (BMI) DOCUMENTED: ICD-10-PCS | Mod: CPTII,S$GLB,, | Performed by: INTERNAL MEDICINE

## 2021-10-01 PROCEDURE — 3075F SYST BP GE 130 - 139MM HG: CPT | Mod: CPTII,S$GLB,, | Performed by: INTERNAL MEDICINE

## 2021-10-01 RX ORDER — FERROUS SULFATE 325(65) MG
325 TABLET ORAL DAILY
Qty: 30 TABLET | Refills: 6
Start: 2021-10-01 | End: 2021-10-01 | Stop reason: SDUPTHER

## 2021-10-01 RX ORDER — FERROUS SULFATE 325(65) MG
325 TABLET ORAL DAILY
Qty: 30 TABLET | Refills: 3
Start: 2021-10-01 | End: 2021-12-06

## 2021-10-03 ENCOUNTER — PATIENT MESSAGE (OUTPATIENT)
Dept: HEMATOLOGY/ONCOLOGY | Facility: CLINIC | Age: 36
End: 2021-10-03

## 2021-10-05 ENCOUNTER — PATIENT MESSAGE (OUTPATIENT)
Dept: ADMINISTRATIVE | Facility: OTHER | Age: 36
End: 2021-10-05

## 2021-10-15 ENCOUNTER — PATIENT MESSAGE (OUTPATIENT)
Dept: FAMILY MEDICINE | Facility: CLINIC | Age: 36
End: 2021-10-15

## 2021-10-18 RX ORDER — UBROGEPANT 50 MG/1
50 TABLET ORAL ONCE AS NEEDED
Qty: 10 TABLET | Refills: 2 | Status: SHIPPED | OUTPATIENT
Start: 2021-10-18 | End: 2021-11-28

## 2021-10-19 ENCOUNTER — TELEPHONE (OUTPATIENT)
Dept: PHARMACY | Facility: CLINIC | Age: 36
End: 2021-10-19

## 2021-10-21 ENCOUNTER — PATIENT MESSAGE (OUTPATIENT)
Dept: HEMATOLOGY/ONCOLOGY | Facility: CLINIC | Age: 36
End: 2021-10-21
Payer: COMMERCIAL

## 2021-10-25 ENCOUNTER — TELEPHONE (OUTPATIENT)
Dept: HEMATOLOGY/ONCOLOGY | Facility: CLINIC | Age: 36
End: 2021-10-25
Payer: COMMERCIAL

## 2021-10-26 ENCOUNTER — TELEPHONE (OUTPATIENT)
Dept: HEMATOLOGY/ONCOLOGY | Facility: CLINIC | Age: 36
End: 2021-10-26
Payer: COMMERCIAL

## 2021-11-18 ENCOUNTER — IMMUNIZATION (OUTPATIENT)
Dept: PHARMACY | Facility: CLINIC | Age: 36
End: 2021-11-18
Payer: COMMERCIAL

## 2021-12-02 ENCOUNTER — PATIENT MESSAGE (OUTPATIENT)
Dept: FAMILY MEDICINE | Facility: CLINIC | Age: 36
End: 2021-12-02
Payer: COMMERCIAL

## 2021-12-03 ENCOUNTER — TELEPHONE (OUTPATIENT)
Dept: FAMILY MEDICINE | Facility: CLINIC | Age: 36
End: 2021-12-03
Payer: COMMERCIAL

## 2021-12-06 ENCOUNTER — OFFICE VISIT (OUTPATIENT)
Dept: FAMILY MEDICINE | Facility: CLINIC | Age: 36
End: 2021-12-06
Payer: COMMERCIAL

## 2021-12-06 VITALS
SYSTOLIC BLOOD PRESSURE: 168 MMHG | HEART RATE: 63 BPM | BODY MASS INDEX: 29.76 KG/M2 | WEIGHT: 168 LBS | DIASTOLIC BLOOD PRESSURE: 96 MMHG | TEMPERATURE: 98 F | OXYGEN SATURATION: 96 %

## 2021-12-06 DIAGNOSIS — Z00.01 ANNUAL VISIT FOR GENERAL ADULT MEDICAL EXAMINATION WITH ABNORMAL FINDINGS: Primary | ICD-10-CM

## 2021-12-06 DIAGNOSIS — I10 PRIMARY HYPERTENSION: ICD-10-CM

## 2021-12-06 DIAGNOSIS — D50.8 OTHER IRON DEFICIENCY ANEMIA: ICD-10-CM

## 2021-12-06 DIAGNOSIS — F41.9 ANXIETY: ICD-10-CM

## 2021-12-06 PROCEDURE — 99395 PREV VISIT EST AGE 18-39: CPT | Mod: S$GLB,,, | Performed by: FAMILY MEDICINE

## 2021-12-06 PROCEDURE — 99999 PR PBB SHADOW E&M-EST. PATIENT-LVL III: ICD-10-PCS | Mod: PBBFAC,,, | Performed by: FAMILY MEDICINE

## 2021-12-06 PROCEDURE — 99999 PR PBB SHADOW E&M-EST. PATIENT-LVL III: CPT | Mod: PBBFAC,,, | Performed by: FAMILY MEDICINE

## 2021-12-06 PROCEDURE — 99395 PR PREVENTIVE VISIT,EST,18-39: ICD-10-PCS | Mod: S$GLB,,, | Performed by: FAMILY MEDICINE

## 2021-12-06 RX ORDER — FLUOXETINE 20 MG/1
20 TABLET ORAL DAILY
Qty: 30 TABLET | Refills: 11 | Status: SHIPPED | OUTPATIENT
Start: 2021-12-06 | End: 2022-01-18

## 2021-12-06 RX ORDER — HYDROCHLOROTHIAZIDE 12.5 MG/1
12.5 TABLET ORAL DAILY
Qty: 30 TABLET | Refills: 11 | Status: SHIPPED | OUTPATIENT
Start: 2021-12-06 | End: 2022-04-21

## 2021-12-07 ENCOUNTER — LAB VISIT (OUTPATIENT)
Dept: LAB | Facility: HOSPITAL | Age: 36
End: 2021-12-07
Attending: FAMILY MEDICINE
Payer: COMMERCIAL

## 2021-12-07 DIAGNOSIS — Z00.01 ANNUAL VISIT FOR GENERAL ADULT MEDICAL EXAMINATION WITH ABNORMAL FINDINGS: ICD-10-CM

## 2021-12-07 DIAGNOSIS — D50.8 OTHER IRON DEFICIENCY ANEMIA: ICD-10-CM

## 2021-12-07 LAB
ALBUMIN SERPL BCP-MCNC: 4.2 G/DL (ref 3.5–5.2)
ALP SERPL-CCNC: 95 U/L (ref 55–135)
ALT SERPL W/O P-5'-P-CCNC: 19 U/L (ref 10–44)
ANION GAP SERPL CALC-SCNC: 9 MMOL/L (ref 8–16)
AST SERPL-CCNC: 18 U/L (ref 10–40)
BASOPHILS # BLD AUTO: 0.06 K/UL (ref 0–0.2)
BASOPHILS NFR BLD: 0.8 % (ref 0–1.9)
BILIRUB SERPL-MCNC: 0.3 MG/DL (ref 0.1–1)
BUN SERPL-MCNC: 14 MG/DL (ref 6–20)
CALCIUM SERPL-MCNC: 9.4 MG/DL (ref 8.7–10.5)
CHLORIDE SERPL-SCNC: 107 MMOL/L (ref 95–110)
CHOLEST SERPL-MCNC: 249 MG/DL (ref 120–199)
CHOLEST/HDLC SERPL: 3.4 {RATIO} (ref 2–5)
CO2 SERPL-SCNC: 24 MMOL/L (ref 23–29)
CREAT SERPL-MCNC: 0.8 MG/DL (ref 0.5–1.4)
DIFFERENTIAL METHOD: ABNORMAL
EOSINOPHIL # BLD AUTO: 0.1 K/UL (ref 0–0.5)
EOSINOPHIL NFR BLD: 1.7 % (ref 0–8)
ERYTHROCYTE [DISTWIDTH] IN BLOOD BY AUTOMATED COUNT: 15.9 % (ref 11.5–14.5)
EST. GFR  (AFRICAN AMERICAN): >60 ML/MIN/1.73 M^2
EST. GFR  (NON AFRICAN AMERICAN): >60 ML/MIN/1.73 M^2
FERRITIN SERPL-MCNC: 16 NG/ML (ref 20–300)
GLUCOSE SERPL-MCNC: 95 MG/DL (ref 70–110)
HCT VFR BLD AUTO: 40.5 % (ref 37–48.5)
HDLC SERPL-MCNC: 73 MG/DL (ref 40–75)
HDLC SERPL: 29.3 % (ref 20–50)
HGB BLD-MCNC: 12.9 G/DL (ref 12–16)
IMM GRANULOCYTES # BLD AUTO: 0.02 K/UL (ref 0–0.04)
IMM GRANULOCYTES NFR BLD AUTO: 0.3 % (ref 0–0.5)
IRON SERPL-MCNC: 38 UG/DL (ref 30–160)
LDLC SERPL CALC-MCNC: 157 MG/DL (ref 63–159)
LYMPHOCYTES # BLD AUTO: 3.3 K/UL (ref 1–4.8)
LYMPHOCYTES NFR BLD: 42.8 % (ref 18–48)
MCH RBC QN AUTO: 25.5 PG (ref 27–31)
MCHC RBC AUTO-ENTMCNC: 31.9 G/DL (ref 32–36)
MCV RBC AUTO: 80 FL (ref 82–98)
MONOCYTES # BLD AUTO: 0.5 K/UL (ref 0.3–1)
MONOCYTES NFR BLD: 6 % (ref 4–15)
NEUTROPHILS # BLD AUTO: 3.7 K/UL (ref 1.8–7.7)
NEUTROPHILS NFR BLD: 48.4 % (ref 38–73)
NONHDLC SERPL-MCNC: 176 MG/DL
NRBC BLD-RTO: 0 /100 WBC
PLATELET # BLD AUTO: 445 K/UL (ref 150–450)
PMV BLD AUTO: 9.4 FL (ref 9.2–12.9)
POTASSIUM SERPL-SCNC: 4.2 MMOL/L (ref 3.5–5.1)
PROT SERPL-MCNC: 7.3 G/DL (ref 6–8.4)
RBC # BLD AUTO: 5.06 M/UL (ref 4–5.4)
SATURATED IRON: 9 % (ref 20–50)
SODIUM SERPL-SCNC: 140 MMOL/L (ref 136–145)
TOTAL IRON BINDING CAPACITY: 416 UG/DL (ref 250–450)
TRANSFERRIN SERPL-MCNC: 281 MG/DL (ref 200–375)
TRIGL SERPL-MCNC: 95 MG/DL (ref 30–150)
TSH SERPL DL<=0.005 MIU/L-ACNC: 2.23 UIU/ML (ref 0.4–4)
WBC # BLD AUTO: 7.68 K/UL (ref 3.9–12.7)

## 2021-12-07 PROCEDURE — 84443 ASSAY THYROID STIM HORMONE: CPT | Performed by: FAMILY MEDICINE

## 2021-12-07 PROCEDURE — 85025 COMPLETE CBC W/AUTO DIFF WBC: CPT | Performed by: FAMILY MEDICINE

## 2021-12-07 PROCEDURE — 82728 ASSAY OF FERRITIN: CPT | Performed by: FAMILY MEDICINE

## 2021-12-07 PROCEDURE — 36415 COLL VENOUS BLD VENIPUNCTURE: CPT | Mod: PO | Performed by: FAMILY MEDICINE

## 2021-12-07 PROCEDURE — 80053 COMPREHEN METABOLIC PANEL: CPT | Performed by: FAMILY MEDICINE

## 2021-12-07 PROCEDURE — 80061 LIPID PANEL: CPT | Performed by: FAMILY MEDICINE

## 2021-12-07 PROCEDURE — 84466 ASSAY OF TRANSFERRIN: CPT | Performed by: FAMILY MEDICINE

## 2021-12-08 ENCOUNTER — PATIENT MESSAGE (OUTPATIENT)
Dept: ADMINISTRATIVE | Facility: OTHER | Age: 36
End: 2021-12-08
Payer: COMMERCIAL

## 2021-12-08 ENCOUNTER — TELEPHONE (OUTPATIENT)
Dept: FAMILY MEDICINE | Facility: CLINIC | Age: 36
End: 2021-12-08
Payer: COMMERCIAL

## 2021-12-08 ENCOUNTER — PATIENT MESSAGE (OUTPATIENT)
Dept: FAMILY MEDICINE | Facility: CLINIC | Age: 36
End: 2021-12-08
Payer: COMMERCIAL

## 2021-12-08 DIAGNOSIS — I10 PRIMARY HYPERTENSION: Primary | ICD-10-CM

## 2021-12-13 ENCOUNTER — PATIENT OUTREACH (OUTPATIENT)
Dept: OTHER | Facility: OTHER | Age: 36
End: 2021-12-13
Payer: COMMERCIAL

## 2021-12-13 NOTE — LETTER
December 27, 2021     Elenijuan BullFlemingkins Mondor  [address] [place] [id number]       Better health is within your reach!     Thank you for your interest in [facility], a clinically-proven program designed to help you manage your chronic condition more conveniently and effectively.     As a participant of [facility], you get:   Technology that lets you monitor your health from the comfort of your home by sending readings directly to your care team   A Clinician who specializes in your condition and can adjust your medications as needed   A personal health  who helps you take manageable steps towards a healthier lifestyle       Your doctor will have access to the readings you submit as part of the program; they will work with your [facility] care team in monitoring your progress.    Unfortunately, we haven't been able to reach you. If we don't hear from you by 1/17/2022, you will lose the opportunity to enroll at this time.     If you are still interested in participating in the program, wed love to help! Call us at [phone]. You can also chat with a live support agent or schedule a virtual tech support appointment by visiting our website here.        Sincerely,     [facility]

## 2021-12-17 ENCOUNTER — TELEPHONE (OUTPATIENT)
Dept: FAMILY MEDICINE | Facility: CLINIC | Age: 36
End: 2021-12-17
Payer: COMMERCIAL

## 2021-12-18 DIAGNOSIS — Z20.828 EXPOSURE TO THE FLU: Primary | ICD-10-CM

## 2021-12-18 RX ORDER — BALOXAVIR MARBOXIL 40 MG/1
40 TABLET, FILM COATED ORAL ONCE
Qty: 1 TABLET | Refills: 0 | Status: SHIPPED | OUTPATIENT
Start: 2021-12-18 | End: 2021-12-18

## 2021-12-18 RX ORDER — OSELTAMIVIR PHOSPHATE 75 MG/1
75 CAPSULE ORAL DAILY
Qty: 10 CAPSULE | Refills: 0 | Status: SHIPPED | OUTPATIENT
Start: 2021-12-18 | End: 2021-12-28

## 2022-01-01 ENCOUNTER — PATIENT MESSAGE (OUTPATIENT)
Dept: ADMINISTRATIVE | Facility: OTHER | Age: 37
End: 2022-01-01
Payer: COMMERCIAL

## 2022-01-03 DIAGNOSIS — R51.9 INTRACTABLE HEADACHE, UNSPECIFIED CHRONICITY PATTERN, UNSPECIFIED HEADACHE TYPE: Primary | ICD-10-CM

## 2022-01-18 ENCOUNTER — OFFICE VISIT (OUTPATIENT)
Dept: FAMILY MEDICINE | Facility: CLINIC | Age: 37
End: 2022-01-18
Payer: COMMERCIAL

## 2022-01-18 ENCOUNTER — PATIENT MESSAGE (OUTPATIENT)
Dept: OTHER | Facility: OTHER | Age: 37
End: 2022-01-18
Payer: COMMERCIAL

## 2022-01-18 VITALS — DIASTOLIC BLOOD PRESSURE: 88 MMHG | SYSTOLIC BLOOD PRESSURE: 128 MMHG | HEART RATE: 69 BPM | OXYGEN SATURATION: 99 %

## 2022-01-18 DIAGNOSIS — I10 ESSENTIAL HYPERTENSION: Primary | ICD-10-CM

## 2022-01-18 DIAGNOSIS — F41.9 ANXIETY: ICD-10-CM

## 2022-01-18 DIAGNOSIS — J34.89 SINUS PRESSURE: ICD-10-CM

## 2022-01-18 PROCEDURE — 99214 PR OFFICE/OUTPT VISIT, EST, LEVL IV, 30-39 MIN: ICD-10-PCS | Mod: S$GLB,,, | Performed by: FAMILY MEDICINE

## 2022-01-18 PROCEDURE — 99999 PR PBB SHADOW E&M-EST. PATIENT-LVL III: ICD-10-PCS | Mod: PBBFAC,,, | Performed by: FAMILY MEDICINE

## 2022-01-18 PROCEDURE — 3074F PR MOST RECENT SYSTOLIC BLOOD PRESSURE < 130 MM HG: ICD-10-PCS | Mod: CPTII,S$GLB,, | Performed by: FAMILY MEDICINE

## 2022-01-18 PROCEDURE — 1160F PR REVIEW ALL MEDS BY PRESCRIBER/CLIN PHARMACIST DOCUMENTED: ICD-10-PCS | Mod: CPTII,S$GLB,, | Performed by: FAMILY MEDICINE

## 2022-01-18 PROCEDURE — 3074F SYST BP LT 130 MM HG: CPT | Mod: CPTII,S$GLB,, | Performed by: FAMILY MEDICINE

## 2022-01-18 PROCEDURE — 99214 OFFICE O/P EST MOD 30 MIN: CPT | Mod: S$GLB,,, | Performed by: FAMILY MEDICINE

## 2022-01-18 PROCEDURE — 99999 PR PBB SHADOW E&M-EST. PATIENT-LVL III: CPT | Mod: PBBFAC,,, | Performed by: FAMILY MEDICINE

## 2022-01-18 PROCEDURE — 3079F PR MOST RECENT DIASTOLIC BLOOD PRESSURE 80-89 MM HG: ICD-10-PCS | Mod: CPTII,S$GLB,, | Performed by: FAMILY MEDICINE

## 2022-01-18 PROCEDURE — 1159F PR MEDICATION LIST DOCUMENTED IN MEDICAL RECORD: ICD-10-PCS | Mod: CPTII,S$GLB,, | Performed by: FAMILY MEDICINE

## 2022-01-18 PROCEDURE — 1159F MED LIST DOCD IN RCRD: CPT | Mod: CPTII,S$GLB,, | Performed by: FAMILY MEDICINE

## 2022-01-18 PROCEDURE — 3079F DIAST BP 80-89 MM HG: CPT | Mod: CPTII,S$GLB,, | Performed by: FAMILY MEDICINE

## 2022-01-18 PROCEDURE — 1160F RVW MEDS BY RX/DR IN RCRD: CPT | Mod: CPTII,S$GLB,, | Performed by: FAMILY MEDICINE

## 2022-01-18 RX ORDER — AMLODIPINE BESYLATE 2.5 MG/1
2.5 TABLET ORAL NIGHTLY
Qty: 30 TABLET | Refills: 11 | Status: SHIPPED | OUTPATIENT
Start: 2022-01-18 | End: 2022-04-21

## 2022-01-18 RX ORDER — AMLODIPINE BESYLATE 2.5 MG/1
2.5 TABLET ORAL DAILY
Qty: 30 TABLET | Refills: 11 | Status: SHIPPED | OUTPATIENT
Start: 2022-01-18 | End: 2022-01-18

## 2022-01-18 RX ORDER — FLUOXETINE 10 MG/1
10 TABLET ORAL DAILY
COMMUNITY
End: 2022-04-21

## 2022-01-18 NOTE — PROGRESS NOTES
Subjective:       Patient ID: Lacey Jenkins Mondor is a 36 y.o. female.    Chief Complaint: Hypertension (Follow up )    HPI       Hypertension: the patient is active on the digital hypertension program,  She has been taking hydrochlorothiazide 12.5 mg daily, denies any side effects of the medication.  The patient is been monitoring her blood pressure and is been elevated the majority of the time in the 130-140 range.  Her diastolic blood pressure is also the majority of the time on the 90s. She denies any symptoms of chest pain shortness of breath, she has being very active, she has to work and take care of the kids also at home.      Sinus problems: the patient is being using a nasal saline spray complains of sinus headaches specially when the weather change, the patient stated that sometimes she has to take antihistamine in order to relieve the pressure in the sinuses.      Anxiety: the patient is feeling less anxious, only feels anxiety when her blood pressure goes up and she has been taking less of the fluoxetine, currently only fluoxetine 10 mg daily, denies any side effects of the medication, the patient denies any depression symptoms.    Past medical history, past social history was reviewed and discussed with the patient.    Review of Systems   Constitutional: Negative for activity change, appetite change and chills.   HENT: Positive for sinus pressure. Negative for congestion and ear discharge.    Eyes: Negative for discharge and itching.   Respiratory: Negative for choking and chest tightness.    Cardiovascular: Negative for chest pain, palpitations and leg swelling.   Gastrointestinal: Negative for abdominal distention, abdominal pain and constipation.   Endocrine: Negative for cold intolerance and heat intolerance.   Genitourinary: Negative for dysuria and flank pain.   Musculoskeletal: Negative for arthralgias and back pain.   Skin: Negative for pallor and rash.   Allergic/Immunologic: Negative for  environmental allergies and food allergies.   Neurological: Positive for headaches. Negative for dizziness and facial asymmetry.   Hematological: Negative for adenopathy. Does not bruise/bleed easily.   Psychiatric/Behavioral: Negative for agitation and confusion. The patient is nervous/anxious.        Objective:      Physical Exam  Vitals and nursing note reviewed.   Constitutional:       General: She is not in acute distress.     Appearance: Normal appearance. She is well-developed and well-nourished. She is not diaphoretic.   HENT:      Head: Normocephalic and atraumatic.      Right Ear: External ear normal.      Left Ear: External ear normal.      Nose: Nose normal.      Mouth/Throat:      Mouth: Oropharynx is clear and moist.      Pharynx: No oropharyngeal exudate.   Eyes:      General: No scleral icterus.        Right eye: No discharge.         Left eye: No discharge.      Conjunctiva/sclera: Conjunctivae normal.   Cardiovascular:      Rate and Rhythm: Normal rate and regular rhythm.      Pulses: Intact distal pulses.      Heart sounds: Normal heart sounds. No murmur heard.      Pulmonary:      Effort: Pulmonary effort is normal. No respiratory distress.      Breath sounds: Normal breath sounds. No wheezing.   Musculoskeletal:         General: No tenderness or deformity.      Cervical back: Neck supple.   Skin:     Coloration: Skin is not pale.      Findings: No erythema.   Neurological:      Mental Status: She is alert.      Cranial Nerves: No cranial nerve deficit.      Coordination: Coordination normal.   Psychiatric:         Mood and Affect: Mood and affect normal.         Behavior: Behavior normal.         Thought Content: Thought content normal.         Judgment: Judgment normal.         Assessment:       1. Essential hypertension    2. Sinus pressure    3. Anxiety        Plan:       Essential hypertension:  Uncontrolled  -     amLODIPine (NORVASC) 2.5 MG tablet; Take 1 tablet (2.5 mg total) by mouth  every evening.  Dispense: 30 tablet; Refill: 11    Sinus pressure: stable    Anxiety: improved    Continue healthy habits, avoid processed food and processed starches, decrease salt intake, will start patient amlodipine 2.5 mg at night and hydrochlorothiazide 12.5 mg daily.  Will continue with fluoxetine 10 mg daily, the patient was advised to use a nasal saline solution or Neti pot with sterile  water daily if possible.   Patient agreed with assessment and plan. Patient verbalized understanding.

## 2022-01-24 ENCOUNTER — OFFICE VISIT (OUTPATIENT)
Dept: OPTOMETRY | Facility: CLINIC | Age: 37
End: 2022-01-24
Payer: COMMERCIAL

## 2022-01-24 DIAGNOSIS — Z01.00 EXAMINATION OF EYES AND VISION: Primary | ICD-10-CM

## 2022-01-24 DIAGNOSIS — H52.203 MYOPIA OF BOTH EYES WITH ASTIGMATISM: ICD-10-CM

## 2022-01-24 DIAGNOSIS — H52.13 MYOPIA OF BOTH EYES WITH ASTIGMATISM: ICD-10-CM

## 2022-01-24 PROCEDURE — 92015 PR REFRACTION: ICD-10-PCS | Mod: S$GLB,,, | Performed by: OPTOMETRIST

## 2022-01-24 PROCEDURE — 99999 PR PBB SHADOW E&M-EST. PATIENT-LVL III: ICD-10-PCS | Mod: PBBFAC,,, | Performed by: OPTOMETRIST

## 2022-01-24 PROCEDURE — 92004 PR EYE EXAM, NEW PATIENT,COMPREHESV: ICD-10-PCS | Mod: S$GLB,,, | Performed by: OPTOMETRIST

## 2022-01-24 PROCEDURE — 99999 PR PBB SHADOW E&M-EST. PATIENT-LVL III: CPT | Mod: PBBFAC,,, | Performed by: OPTOMETRIST

## 2022-01-24 PROCEDURE — 92015 DETERMINE REFRACTIVE STATE: CPT | Mod: S$GLB,,, | Performed by: OPTOMETRIST

## 2022-01-24 PROCEDURE — 92004 COMPRE OPH EXAM NEW PT 1/>: CPT | Mod: S$GLB,,, | Performed by: OPTOMETRIST

## 2022-01-24 NOTE — PROGRESS NOTES
HPI     Routine eye exam-dle-over 1 year    Pt complains of blurred vision at near. Has new job working on computer.   Had htn during recent pregnancy and complains of headaches. Getting   controlled with meds.     Last edited by Kiara Morton on 1/24/2022  3:49 PM. (History)        ROS     Positive for: Eyes    Negative for: Constitutional, Gastrointestinal, Neurological, Skin,   Genitourinary, Musculoskeletal, HENT, Endocrine, Cardiovascular,   Respiratory, Psychiatric, Allergic/Imm, Heme/Lymph    Last edited by PIERO Bach, OD on 1/24/2022  4:16 PM. (History)        Assessment /Plan     For exam results, see Encounter Report.    Examination of eyes and vision    Myopia of both eyes with astigmatism      1. Ocular health exam OU  2. Updated specs rx, gave copy, fill prn    Some dry eye/ eye strain working computer all during the day  Reviewed ATs, gave otc suggestions --increase to bid / tid use   Continue uncorrected at computer     Discussed and educated patient on current findings /plan.  RTC 1 year, prn if any changes / issues

## 2022-01-24 NOTE — PATIENT INSTRUCTIONS
"DRY EYES -- BURNING OR ELIE SYMPTOMS:  Use Over The Counter artificial tears as needed for dry eye symptoms.   Some common brands include:  Systane, Optive, Refresh, and Thera-Tears.  These drops can be used as frequently as desired, but may be most helpful use during long periods of concentrated work.  For example, reading / working at the computer. Start with 3-4x per day.     Nighttime Ophthalmic gel or ointments are available: Refresh PM, Genteal, and Lacrilube.    Avoid drops that "get redness out" (Visine, Murine, Clear Eyes), as these may contain medication that could further irritate the eyes, especially with chronic use.    ALLERGY EYES -- ITCHING SYMPTOMS:  Over the counter medications include--Pataday, Zaditor, and Alaway.  Use as directed 1-2 drops daily for symptoms of itching / watering eyes.  These drops will not help for dry eye or exposure symptoms.    REDNESS RELIEF:  Lumify---is a good redness reliever that will not cause irritation if used chronically.         FLASHES / FLOATERS / POSTERIOR VITREOUS DETACHMENT    Call the clinic if you have any further changes in symptoms.  Including:  Increased numbers of floaters or flashing lights, dimness or darkness that moves through or stays constant in your vision, or any pain in the eye (s).    You may sometimes see small specks or clouds moving in your field of vision.  They are called FLOATERS.  You can often see them when looking at a plain background, like a blank wall or blue oneyda.  Floaters are actually tiny clumps of gel or cells inside the VITREOUS, the clear jelly-like fluid that fills the inside of your eye.    While these objects look like they are in front of your eye, they are actually floating inside.  What you see are the shadows they cast on the RETINA, the nerve layer at the back of the eye that senses light and allows you to see.      POSTERIOR VITREOUS DETACHMENT    The appearance of new floaters may be alarming.  If you suddenly " develop new floaters, you should contact your eye care professional  right away.    The retina can tear if the shrinking vitreous pulls away from the wall of the eye.  This sometimes causes a small amount of bleeding in the eye that may appear as new floaters.    A torn retina is always a serious problem, since it can lead to a retinal detachment.  You should see your eye care professional as soon as possible if:     even one new floater appears suddenly;   you see sudden flashes of light;   you notice other symptoms, like the loss of side vision, or a curtain closes down in your vision        POSTERIOR VITREOUS DETACHMENT is more common for people who:     are nearsighted;   have had cataract surgery;   have had YAG laser surgery of the eye;   have had inflammation inside the eye;   are over age 60.      While some floaters may remain visible, many of them will fade over time and become less noticeable.  Even if you've had some floaters for years, you should have your eyes checked as soon as possible if you notice new ones.    FLASHING LIGHTS    When the vitreous gel rubs or pulls on the retina, you may see what look like flashing lights or lightning streaks.  These flashes can appear off and on for several weeks or months.      Some people experience flashes of light that appear as jagged lines or heat waves in both eyes, lasting 10-20 minutes.  These flashes are caused by a spasm of blood vessels in the brain, which is called a migraine.    If a headache follows these flashes, it's called a migraine headache.  If   no headache occurs, these flashes are called Ophthalmic or Ocular Migraine.

## 2022-01-24 NOTE — PROGRESS NOTES
Digital Medicine: Health  Introduction    Introduced Lacey Mondor to Digital Medicine. Discussed health  role and recommended lifestyle modifications.    The history is provided by the patient.           Additional Enrollment Details: Patient states that she's had lingering problems with her bp since her pregnancy; she delivered September 14th. She states that she also had preeclampsia with her first child but it took over 6 weeks for her bp to return to normal. She started taking hctz 12.5mg in December and just last week she reports seeing improvements to her bp once she started taking amlodipine 2.5mg.      Reviewed these steps when taking your blood pressure to ensure an accurate reading.    1. Be still - ensure at least 5 minutes of quiet rest before measurements. Do not talk or listen while taking the reading.  2. Don't smoke, drink caffeinated beverages or exercise within 30 minutes before measuring your blood pressure. Empty your bladder.  3. Sit correctly - sit with your back straight and supported (on a dining chair, rather than a sofa). Your feet should be flat on the floor and your legs should not be crossed. Your arm should be supported on a flat surface (such as a table) with the upper arm at heart level. Make sure the bottom of the cuff is placed directly above the bend of the elbow.   4. Measure at the different times of the day, at least 45 minutes after your medications. It is best to take the readings daily.  5. Dont take the measurement over clothes.          HYPERTENSION  Explained hypertension digital medicine goals including meeting patient-specific BP goal, improved convenience of BP management and reduced risk of heart attack, kidney failure, stroke, eye disease, dementia, and death.      Explained non-pharmacologic therapies like low salt diet and physical activity can reduce blood pressure. .      Explained that we expect patient to submit several blood pressure readings per  week at random times of the day, but at least 30 minutes after taking blood pressure medications. Instructed patient not to allow anyone else to use their blood pressure monitor and phone as data submitted is directly entered into medical record. Reviewed and confirmed appropriate blood pressure monitoring technique.                   Diet-Assessed    Patient reports eating or drinking the following: Patient states that her  prepares their meals; she does not have time to read food labels.    Patient and I reviewed the recommendations from AHA to limit daily sodium intake <1500mg.      Intervention(s): reading food labels, reducing processed foods and DASH diet/sodium reduction education      Physical Activity-Assessed      Additional physical activity details: Patient states that she get about 3,000-4,000 steps a day.              Provided patient education.  Reviewed Device Techniques.     Addressed patient questions and patient has my contact information if needed prior to next outreach.   Explained the importance of self-monitoring and medication adherence. Encouraged the patient to communicate with their health  for lifestyle modifications to help improve or maintain a healthy lifestyle.            There are no preventive care reminders to display for this patient.       Last 5 Patient Entered Readings                Current 30 Day Average: 137/89  Recent Readings 1/21/2022 1/21/2022 1/21/2022 1/21/2022 1/11/2022   SBP (mmHg) 125 128 117 141 143   DBP (mmHg) 76 82 90 89 94   Pulse 66 68 67 69 60

## 2022-01-30 ENCOUNTER — PATIENT MESSAGE (OUTPATIENT)
Dept: OTHER | Facility: OTHER | Age: 37
End: 2022-01-30
Payer: COMMERCIAL

## 2022-03-27 ENCOUNTER — PATIENT MESSAGE (OUTPATIENT)
Dept: OTHER | Facility: OTHER | Age: 37
End: 2022-03-27
Payer: COMMERCIAL

## 2022-03-27 ENCOUNTER — PATIENT MESSAGE (OUTPATIENT)
Dept: FAMILY MEDICINE | Facility: CLINIC | Age: 37
End: 2022-03-27
Payer: COMMERCIAL

## 2022-03-29 ENCOUNTER — PATIENT MESSAGE (OUTPATIENT)
Dept: FAMILY MEDICINE | Facility: CLINIC | Age: 37
End: 2022-03-29
Payer: COMMERCIAL

## 2022-03-30 ENCOUNTER — PATIENT MESSAGE (OUTPATIENT)
Dept: OTHER | Facility: OTHER | Age: 37
End: 2022-03-30
Payer: COMMERCIAL

## 2022-04-21 ENCOUNTER — OFFICE VISIT (OUTPATIENT)
Dept: FAMILY MEDICINE | Facility: CLINIC | Age: 37
End: 2022-04-21
Payer: COMMERCIAL

## 2022-04-21 ENCOUNTER — LAB VISIT (OUTPATIENT)
Dept: LAB | Facility: HOSPITAL | Age: 37
End: 2022-04-21
Attending: FAMILY MEDICINE
Payer: COMMERCIAL

## 2022-04-21 VITALS
OXYGEN SATURATION: 99 % | DIASTOLIC BLOOD PRESSURE: 68 MMHG | BODY MASS INDEX: 29.84 KG/M2 | SYSTOLIC BLOOD PRESSURE: 110 MMHG | HEART RATE: 78 BPM | WEIGHT: 168.44 LBS | HEIGHT: 63 IN

## 2022-04-21 DIAGNOSIS — E61.1 IRON DEFICIENCY: ICD-10-CM

## 2022-04-21 DIAGNOSIS — F41.9 ANXIETY: Primary | ICD-10-CM

## 2022-04-21 LAB
BASOPHILS # BLD AUTO: 0.04 K/UL (ref 0–0.2)
BASOPHILS NFR BLD: 0.6 % (ref 0–1.9)
DIFFERENTIAL METHOD: ABNORMAL
EOSINOPHIL # BLD AUTO: 0.1 K/UL (ref 0–0.5)
EOSINOPHIL NFR BLD: 1.4 % (ref 0–8)
ERYTHROCYTE [DISTWIDTH] IN BLOOD BY AUTOMATED COUNT: 13.9 % (ref 11.5–14.5)
FERRITIN SERPL-MCNC: 37 NG/ML (ref 20–300)
HCT VFR BLD AUTO: 42.3 % (ref 37–48.5)
HGB BLD-MCNC: 13.5 G/DL (ref 12–16)
IMM GRANULOCYTES # BLD AUTO: 0.01 K/UL (ref 0–0.04)
IMM GRANULOCYTES NFR BLD AUTO: 0.2 % (ref 0–0.5)
IRON SERPL-MCNC: 69 UG/DL (ref 30–160)
LYMPHOCYTES # BLD AUTO: 2.2 K/UL (ref 1–4.8)
LYMPHOCYTES NFR BLD: 33.9 % (ref 18–48)
MCH RBC QN AUTO: 28.1 PG (ref 27–31)
MCHC RBC AUTO-ENTMCNC: 31.9 G/DL (ref 32–36)
MCV RBC AUTO: 88 FL (ref 82–98)
MONOCYTES # BLD AUTO: 0.4 K/UL (ref 0.3–1)
MONOCYTES NFR BLD: 6.2 % (ref 4–15)
NEUTROPHILS # BLD AUTO: 3.8 K/UL (ref 1.8–7.7)
NEUTROPHILS NFR BLD: 57.7 % (ref 38–73)
NRBC BLD-RTO: 0 /100 WBC
PLATELET # BLD AUTO: 398 K/UL (ref 150–450)
PMV BLD AUTO: 10.1 FL (ref 9.2–12.9)
RBC # BLD AUTO: 4.81 M/UL (ref 4–5.4)
SATURATED IRON: 17 % (ref 20–50)
TOTAL IRON BINDING CAPACITY: 410 UG/DL (ref 250–450)
TRANSFERRIN SERPL-MCNC: 277 MG/DL (ref 200–375)
WBC # BLD AUTO: 6.6 K/UL (ref 3.9–12.7)

## 2022-04-21 PROCEDURE — 3074F PR MOST RECENT SYSTOLIC BLOOD PRESSURE < 130 MM HG: ICD-10-PCS | Mod: CPTII,S$GLB,, | Performed by: FAMILY MEDICINE

## 2022-04-21 PROCEDURE — 1160F PR REVIEW ALL MEDS BY PRESCRIBER/CLIN PHARMACIST DOCUMENTED: ICD-10-PCS | Mod: CPTII,S$GLB,, | Performed by: FAMILY MEDICINE

## 2022-04-21 PROCEDURE — 36415 COLL VENOUS BLD VENIPUNCTURE: CPT | Mod: PO | Performed by: FAMILY MEDICINE

## 2022-04-21 PROCEDURE — 99213 PR OFFICE/OUTPT VISIT, EST, LEVL III, 20-29 MIN: ICD-10-PCS | Mod: S$GLB,,, | Performed by: FAMILY MEDICINE

## 2022-04-21 PROCEDURE — 82728 ASSAY OF FERRITIN: CPT | Performed by: FAMILY MEDICINE

## 2022-04-21 PROCEDURE — 99999 PR PBB SHADOW E&M-EST. PATIENT-LVL III: CPT | Mod: PBBFAC,,, | Performed by: FAMILY MEDICINE

## 2022-04-21 PROCEDURE — 3078F PR MOST RECENT DIASTOLIC BLOOD PRESSURE < 80 MM HG: ICD-10-PCS | Mod: CPTII,S$GLB,, | Performed by: FAMILY MEDICINE

## 2022-04-21 PROCEDURE — 1160F RVW MEDS BY RX/DR IN RCRD: CPT | Mod: CPTII,S$GLB,, | Performed by: FAMILY MEDICINE

## 2022-04-21 PROCEDURE — 85025 COMPLETE CBC W/AUTO DIFF WBC: CPT | Performed by: FAMILY MEDICINE

## 2022-04-21 PROCEDURE — 3074F SYST BP LT 130 MM HG: CPT | Mod: CPTII,S$GLB,, | Performed by: FAMILY MEDICINE

## 2022-04-21 PROCEDURE — 84466 ASSAY OF TRANSFERRIN: CPT | Performed by: FAMILY MEDICINE

## 2022-04-21 PROCEDURE — 3078F DIAST BP <80 MM HG: CPT | Mod: CPTII,S$GLB,, | Performed by: FAMILY MEDICINE

## 2022-04-21 PROCEDURE — 3008F PR BODY MASS INDEX (BMI) DOCUMENTED: ICD-10-PCS | Mod: CPTII,S$GLB,, | Performed by: FAMILY MEDICINE

## 2022-04-21 PROCEDURE — 3008F BODY MASS INDEX DOCD: CPT | Mod: CPTII,S$GLB,, | Performed by: FAMILY MEDICINE

## 2022-04-21 PROCEDURE — 1159F MED LIST DOCD IN RCRD: CPT | Mod: CPTII,S$GLB,, | Performed by: FAMILY MEDICINE

## 2022-04-21 PROCEDURE — 1159F PR MEDICATION LIST DOCUMENTED IN MEDICAL RECORD: ICD-10-PCS | Mod: CPTII,S$GLB,, | Performed by: FAMILY MEDICINE

## 2022-04-21 PROCEDURE — 99213 OFFICE O/P EST LOW 20 MIN: CPT | Mod: S$GLB,,, | Performed by: FAMILY MEDICINE

## 2022-04-21 PROCEDURE — 99999 PR PBB SHADOW E&M-EST. PATIENT-LVL III: ICD-10-PCS | Mod: PBBFAC,,, | Performed by: FAMILY MEDICINE

## 2022-04-21 RX ORDER — BUPROPION HYDROCHLORIDE 150 MG/1
150 TABLET ORAL DAILY
COMMUNITY
End: 2022-07-15 | Stop reason: SDUPTHER

## 2022-04-22 ENCOUNTER — PATIENT MESSAGE (OUTPATIENT)
Dept: FAMILY MEDICINE | Facility: CLINIC | Age: 37
End: 2022-04-22
Payer: COMMERCIAL

## 2022-04-23 NOTE — PROGRESS NOTES
Assessment:       1. Anxiety    2. Iron deficiency        Plan:       Anxiety:  Stable    Iron deficiency:  Improved  -     CBC Auto Differential; Future; Expected date: 04/21/2022  -     Iron and TIBC; Future; Expected date: 04/21/2022  -     Ferritin; Future; Expected date: 04/21/2022    Continue healthy habits, will call the patient after we have the results of the test.     Patient agreed with assessment and plan. Patient verbalized understanding.     Subjective:       Patient ID: Lacey Jenkins Mondor is a 36 y.o. female.    Chief Complaint: Follow-up follow-up high blood pressure    HPI     The patient coming here today for a follow-up visit, the patient stop taking the blood pressure medications because the blood pressure improved considerably after starting eating a very healthy diet.  The patient is feeling well today, denies any symptoms of chest pain, shortness of breath, nausea, vomiting, abdominal pain.  She is taking Wellbutrin for anxiety, she is doing good with with current treatment.  She stop taking fluoxetine because he was causing side effects.  Upon review of the last blood work, the iron levels were low.      Past medical history, past social history was reviewed and discussed with the patient.    Review of Systems   Constitutional: Negative for activity change, appetite change and chills.   HENT: Negative for congestion and ear discharge.    Eyes: Negative for discharge and itching.   Respiratory: Negative for choking and chest tightness.    Cardiovascular: Negative for chest pain, palpitations and leg swelling.   Gastrointestinal: Negative for abdominal distention, abdominal pain and constipation.   Endocrine: Negative for cold intolerance and heat intolerance.   Genitourinary: Negative for dysuria and flank pain.   Musculoskeletal: Negative for arthralgias and back pain.   Skin: Negative for pallor and rash.   Allergic/Immunologic: Negative for environmental allergies and food allergies.    Neurological: Negative for dizziness, facial asymmetry and headaches.   Hematological: Negative for adenopathy. Does not bruise/bleed easily.   Psychiatric/Behavioral: Negative for agitation, confusion, decreased concentration and sleep disturbance.       Objective:      Physical Exam  Vitals and nursing note reviewed.   Constitutional:       General: She is not in acute distress.     Appearance: Normal appearance. She is well-developed. She is not diaphoretic.   HENT:      Head: Normocephalic and atraumatic.      Right Ear: External ear normal.      Left Ear: External ear normal.      Nose: Nose normal.   Eyes:      General: No scleral icterus.        Right eye: No discharge.         Left eye: No discharge.      Conjunctiva/sclera: Conjunctivae normal.   Cardiovascular:      Rate and Rhythm: Normal rate and regular rhythm.      Heart sounds: Normal heart sounds. No murmur heard.  Pulmonary:      Effort: Pulmonary effort is normal. No respiratory distress.      Breath sounds: Normal breath sounds. No wheezing.   Abdominal:      Palpations: There is no mass.   Musculoskeletal:         General: No tenderness or deformity.      Cervical back: Neck supple.   Skin:     Coloration: Skin is not pale.      Findings: No erythema.   Neurological:      Mental Status: She is alert.      Cranial Nerves: No cranial nerve deficit.      Coordination: Coordination normal.   Psychiatric:         Behavior: Behavior normal.         Thought Content: Thought content normal.         Judgment: Judgment normal.

## 2022-05-12 ENCOUNTER — PATIENT MESSAGE (OUTPATIENT)
Dept: OTHER | Facility: OTHER | Age: 37
End: 2022-05-12
Payer: COMMERCIAL

## 2022-06-24 ENCOUNTER — PATIENT MESSAGE (OUTPATIENT)
Dept: ADMINISTRATIVE | Facility: OTHER | Age: 37
End: 2022-06-24
Payer: COMMERCIAL

## 2022-07-15 ENCOUNTER — PATIENT MESSAGE (OUTPATIENT)
Dept: FAMILY MEDICINE | Facility: CLINIC | Age: 37
End: 2022-07-15
Payer: COMMERCIAL

## 2022-07-15 DIAGNOSIS — F41.9 ANXIETY: ICD-10-CM

## 2022-07-15 RX ORDER — BUPROPION HYDROCHLORIDE 150 MG/1
300 TABLET ORAL DAILY
Qty: 180 TABLET | Refills: 3 | Status: SHIPPED | OUTPATIENT
Start: 2022-07-15 | End: 2023-06-09 | Stop reason: SDUPTHER

## 2022-07-15 NOTE — TELEPHONE ENCOUNTER
No new care gaps identified.  [facility] Embedded Care Gaps. Reference number: [id number]. 7/15/2022   9:46:02 AM SHREYAT

## 2022-09-08 ENCOUNTER — PATIENT MESSAGE (OUTPATIENT)
Dept: PSYCHIATRY | Facility: CLINIC | Age: 37
End: 2022-09-08
Payer: COMMERCIAL

## 2022-09-23 ENCOUNTER — OFFICE VISIT (OUTPATIENT)
Dept: PSYCHIATRY | Facility: CLINIC | Age: 37
End: 2022-09-23
Payer: COMMERCIAL

## 2022-09-23 DIAGNOSIS — F33.1 MODERATE EPISODE OF RECURRENT MAJOR DEPRESSIVE DISORDER: Primary | ICD-10-CM

## 2022-09-23 DIAGNOSIS — F41.1 GAD (GENERALIZED ANXIETY DISORDER): ICD-10-CM

## 2022-09-23 PROCEDURE — 90791 PSYCH DIAGNOSTIC EVALUATION: CPT | Mod: 95,,, | Performed by: PSYCHOLOGIST

## 2022-09-23 PROCEDURE — 90791 PR PSYCHIATRIC DIAGNOSTIC EVALUATION: ICD-10-PCS | Mod: 95,,, | Performed by: PSYCHOLOGIST

## 2022-09-23 NOTE — PROGRESS NOTES
Primary Care Behavioral Health: Initial  Patient Name: Lacey Jenkins Mondor  Date:  09/23/2022  Site:  [facility]  Referral source: Sandra Andersen MD    Chief complaint/reason for encounter:  Anxiety and Depression    History of present illness:  Ms. Lacey Jenkins Mondor is a 37 y.o. White female referred by Sandra Andersen MD.  Patient was seen, examined and chart was reviewed.  Patient presents today noting hx of anxiety and depression.  Patient notes she was engaged in therapy virtually however due to cost would prefer to utilize an [facility] provider or [facility].  Patient notes she is currently having notable stress in how to support her oldest child who is struggling at school with his new teacher.  Patient notes she is seeking to engage in treatment to manage anxiety and depression as well develop strategies to assist her son.    Past Medical History:   Diagnosis Date    Abnormal thyroid stimulating hormone (TSH) level     Depression     wellbutrin    Goiter     Ovarian cyst     Pancreatitis     Preeclampsia          Current Outpatient Medications:     buPROPion (WELLBUTRIN XL) 150 MG TB24 tablet, Take 2 tablets (300 mg total) by mouth once daily., Disp: 180 tablet, Rfl: 3    triamcinolone acetonide 0.1% (KENALOG) 0.1 % cream, Apply topically 2 (two) times daily. hands, Disp: 453.6 g, Rfl: 1    Psychiatric history:  Previous diagnosis: Anxiety, Depression  Previous medications: Wellbutrin   Previous hospitalizations: Patient   History of outpatient treatment: Patient notes she was engaged in therapy with a provider through [facility] however due to cost she decided to discontinue.  Patient notes she did 12 sessions with that therapist and found this help.  Patient previously engaged in  treatment with this provider previously.  Previous suicide attempt:  Patient denies.  Family history of psychiatric illness:  Patient     Current and past substance use:  Alcohol:  Denied current use.  Denied history of abuse or  dependency.  Drugs:  Denied current use.  Denied history of abuse or dependency.  Tobacco:  Denied current use.  Caffeine:  2-3 cups of coffee per day    Psychiatric symptoms:  Depression:  Patient notes she has been experiencing some symptoms of poor frustration tolerance, feeling bad about herself, sleep difficulty.  She denied suicidal ideation.  Queta/Hypomania:  Denied.  She denied periods of elevated mood or abnormally increased energy or goal-directed activity.  Anxiety:  Patient endorses symptoms of nervousness and worry.  Thoughts:  Denied delusions, hallucinations.  Suicidal thoughts/behaviors: Patient denied suicidal and homicidal ideation, plan and intent.  Patient noted agreement to call 911/and or present to the ED if she experienced suicidal or homicidal ideation, plan or intent.    Self-injury:  Patient denies.  Sleep: Patient notes some restless sleep.  Trauma: Patient denies.    Mental Status Exam:  General appearance:  appears stated age, neatly dressed, well groomed  Speech:  Clear and intelligible, normal rate, normal tone, normal pitch, normal volume  Level of cooperation:  cooperative  Thought processes:  Linear, logical, goal-directed  Mood:  Generally euthymic  Thought content:  Relevant and appropriate  Affect:  appropriate and congruent  Orientation:  oriented to person, place, situation and date  Memory/Attention/Concentration:  No gross cognitive deficits made evident during conversation.  Judgment and insight: fair  Language:  intact    Little interest or pleasure in doing things: Not at all  Feeling down, depressed, or hopeless: Not at all  Trouble falling or staying asleep, or sleeping too much: Nearly every day  Feeling tired or having little energy: Nearly every day  Poor appetite or overeating: More than half the days  Feeling bad about yourself - or that you are a failure or have let yourself or your family down: Nearly every day  Trouble concentrating on things, such as reading  the newspaper or watching television: Not at all  Moving or speaking so slowly that other people could have noticed. Or the opposite - being so fidgety or restless that you have been moving around a lot more than usual: Not at all  Thoughts that you would be better off dead, or of hurting yourself in some way: Not at all  PHQ-9 Total Score: 11     PHQ-9 Total Score: 11    GAD7 9/23/2022 6/10/2020   1. Feeling nervous, anxious, or on edge? 2 3   2. Not being able to stop or control worrying? 1 2   3. Worrying too much about different things? 0 1   4. Trouble relaxing? 0 0   5. Being so restless that it is hard to sit still? 0 0   6. Becoming easily annoyed or irritable? 1 3   7. Feeling afraid as if something awful might happen? 2 2   DEMETRIUS-7 Score 6 11     Impression: Patient presents today noting hx of anxiety and depression.  Patient notes she has been experiencing some symptoms of anxiety and depression.  Patient notes one of her children is struggling adapting to school and she is looking to develop strategies to support him.    Diagnosis:     1. Moderate episode of recurrent major depressive disorder  Ambulatory referral/consult to Psychiatry      2. DEMETRIUS (generalized anxiety disorder)  Ambulatory referral/consult to Psychiatry        Plan:    1.  Patient provided psychoeducation on anxiety and depression.  2.  Methods of management of episodes of increased stress and anxiety; change of setting, deep breathing/relaxation, distraction.  3. Stress management discussed.  4.  Identifying anxious thinking and challenging anxious thinking discussed.  5.  Patient notes desire to engage in longer term therapy as well as utilize [facility].   Referral placed and message sent to [facility] staff for continuity of care.  Patient to follow-up PRN.    Length of Session: 45 min face to face, 15 min indirect

## 2022-09-24 ENCOUNTER — IMMUNIZATION (OUTPATIENT)
Dept: FAMILY MEDICINE | Facility: CLINIC | Age: 37
End: 2022-09-24
Payer: COMMERCIAL

## 2022-09-24 PROCEDURE — 90471 IMMUNIZATION ADMIN: CPT | Mod: S$GLB,,, | Performed by: FAMILY MEDICINE

## 2022-09-24 PROCEDURE — 90471 FLU VACCINE (QUAD) GREATER THAN OR EQUAL TO 3YO PRESERVATIVE FREE IM: ICD-10-PCS | Mod: S$GLB,,, | Performed by: FAMILY MEDICINE

## 2022-09-24 PROCEDURE — 90686 IIV4 VACC NO PRSV 0.5 ML IM: CPT | Mod: S$GLB,,, | Performed by: FAMILY MEDICINE

## 2022-09-24 PROCEDURE — 90686 FLU VACCINE (QUAD) GREATER THAN OR EQUAL TO 3YO PRESERVATIVE FREE IM: ICD-10-PCS | Mod: S$GLB,,, | Performed by: FAMILY MEDICINE

## 2022-09-29 ENCOUNTER — PATIENT MESSAGE (OUTPATIENT)
Dept: FAMILY MEDICINE | Facility: CLINIC | Age: 37
End: 2022-09-29
Payer: COMMERCIAL

## 2022-09-29 RX ORDER — UBROGEPANT 50 MG/1
50 TABLET ORAL ONCE AS NEEDED
Qty: 10 TABLET | Refills: 2 | Status: SHIPPED | OUTPATIENT
Start: 2022-09-29 | End: 2022-10-30

## 2022-09-30 ENCOUNTER — TELEPHONE (OUTPATIENT)
Dept: PHARMACY | Facility: CLINIC | Age: 37
End: 2022-09-30
Payer: COMMERCIAL

## 2022-10-05 ENCOUNTER — PATIENT MESSAGE (OUTPATIENT)
Dept: PSYCHIATRY | Facility: CLINIC | Age: 37
End: 2022-10-05
Payer: COMMERCIAL

## 2022-10-20 ENCOUNTER — HOSPITAL ENCOUNTER (OUTPATIENT)
Dept: RADIOLOGY | Facility: HOSPITAL | Age: 37
Discharge: HOME | End: 2022-10-20
Attending: FAMILY MEDICINE
Payer: COMMERCIAL

## 2022-10-20 ENCOUNTER — OFFICE VISIT (OUTPATIENT)
Dept: FAMILY MEDICINE | Facility: CLINIC | Age: 37
End: 2022-10-20
Payer: COMMERCIAL

## 2022-10-20 VITALS
BODY MASS INDEX: 28.31 KG/M2 | OXYGEN SATURATION: 99 % | HEART RATE: 88 BPM | WEIGHT: 159.81 LBS | SYSTOLIC BLOOD PRESSURE: 138 MMHG | DIASTOLIC BLOOD PRESSURE: 82 MMHG

## 2022-10-20 DIAGNOSIS — E61.1 IRON DEFICIENCY: ICD-10-CM

## 2022-10-20 DIAGNOSIS — R07.89 CHEST PRESSURE: ICD-10-CM

## 2022-10-20 DIAGNOSIS — Z00.01 ANNUAL VISIT FOR GENERAL ADULT MEDICAL EXAMINATION WITH ABNORMAL FINDINGS: Primary | ICD-10-CM

## 2022-10-20 PROCEDURE — 3008F BODY MASS INDEX DOCD: CPT | Mod: CPTII,S$GLB,, | Performed by: FAMILY MEDICINE

## 2022-10-20 PROCEDURE — 3075F PR MOST RECENT SYSTOLIC BLOOD PRESS GE 130-139MM HG: ICD-10-PCS | Mod: CPTII,S$GLB,, | Performed by: FAMILY MEDICINE

## 2022-10-20 PROCEDURE — 3079F DIAST BP 80-89 MM HG: CPT | Mod: CPTII,S$GLB,, | Performed by: FAMILY MEDICINE

## 2022-10-20 PROCEDURE — 93005 EKG 12-LEAD: ICD-10-PCS | Mod: S$GLB,,, | Performed by: FAMILY MEDICINE

## 2022-10-20 PROCEDURE — 71046 XR CHEST PA AND LATERAL: ICD-10-PCS | Mod: 26,,, | Performed by: RADIOLOGY

## 2022-10-20 PROCEDURE — 3079F PR MOST RECENT DIASTOLIC BLOOD PRESSURE 80-89 MM HG: ICD-10-PCS | Mod: CPTII,S$GLB,, | Performed by: FAMILY MEDICINE

## 2022-10-20 PROCEDURE — 99395 PR PREVENTIVE VISIT,EST,18-39: ICD-10-PCS | Mod: S$GLB,,, | Performed by: FAMILY MEDICINE

## 2022-10-20 PROCEDURE — 99999 PR PBB SHADOW E&M-EST. PATIENT-LVL III: ICD-10-PCS | Mod: PBBFAC,,, | Performed by: FAMILY MEDICINE

## 2022-10-20 PROCEDURE — 3008F PR BODY MASS INDEX (BMI) DOCUMENTED: ICD-10-PCS | Mod: CPTII,S$GLB,, | Performed by: FAMILY MEDICINE

## 2022-10-20 PROCEDURE — 93010 ELECTROCARDIOGRAM REPORT: CPT | Mod: S$GLB,,, | Performed by: INTERNAL MEDICINE

## 2022-10-20 PROCEDURE — 93005 ELECTROCARDIOGRAM TRACING: CPT | Mod: S$GLB,,, | Performed by: FAMILY MEDICINE

## 2022-10-20 PROCEDURE — 71046 X-RAY EXAM CHEST 2 VIEWS: CPT | Mod: 26,,, | Performed by: RADIOLOGY

## 2022-10-20 PROCEDURE — 99395 PREV VISIT EST AGE 18-39: CPT | Mod: S$GLB,,, | Performed by: FAMILY MEDICINE

## 2022-10-20 PROCEDURE — 99999 PR PBB SHADOW E&M-EST. PATIENT-LVL III: CPT | Mod: PBBFAC,,, | Performed by: FAMILY MEDICINE

## 2022-10-20 PROCEDURE — 3075F SYST BP GE 130 - 139MM HG: CPT | Mod: CPTII,S$GLB,, | Performed by: FAMILY MEDICINE

## 2022-10-20 PROCEDURE — 93010 EKG 12-LEAD: ICD-10-PCS | Mod: S$GLB,,, | Performed by: INTERNAL MEDICINE

## 2022-10-20 PROCEDURE — 1159F PR MEDICATION LIST DOCUMENTED IN MEDICAL RECORD: ICD-10-PCS | Mod: CPTII,S$GLB,, | Performed by: FAMILY MEDICINE

## 2022-10-20 PROCEDURE — 1159F MED LIST DOCD IN RCRD: CPT | Mod: CPTII,S$GLB,, | Performed by: FAMILY MEDICINE

## 2022-10-20 PROCEDURE — 71046 X-RAY EXAM CHEST 2 VIEWS: CPT | Mod: TC,FY,PO

## 2022-10-21 ENCOUNTER — PATIENT MESSAGE (OUTPATIENT)
Dept: PSYCHIATRY | Facility: CLINIC | Age: 37
End: 2022-10-21
Payer: COMMERCIAL

## 2022-10-23 NOTE — PROGRESS NOTES
Assessment:       1. Annual visit for general adult medical examination with abnormal findings    2. Iron deficiency    3. Chest pressure          Plan:       Annual visit for general adult medical examination with abnormal findings  -     Iron and TIBC; Future; Expected date: 10/20/2022  -     FERRITIN; Future; Expected date: 10/20/2022  -     CBC Auto Differential; Future; Expected date: 10/20/2022  -     Comprehensive Metabolic Panel; Future; Expected date: 10/20/2022  -     Lipid Panel; Future; Expected date: 10/20/2022  -     TSH; Future; Expected date: 10/20/2022    Iron deficiency:  Improved  -     Iron and TIBC; Future; Expected date: 10/20/2022  -     FERRITIN; Future; Expected date: 10/20/2022    Chest pressure:  New problem workup needed  -     EKG 12-lead; Future  -     X-Ray Chest PA And Lateral; Future; Expected date: 10/20/2022       Continue healthy eating habits, avoid processed foods and processed starches, EKG did not demonstrate any abnormalities, will order chest x-ray.  The patient's BMI has been recorded in the chart. The patient has been provided educational materials regarding the benefits of attaining and maintaining a normal weight. We will continue to address and follow this issue during follow up visits.   Patient agreed with assessment and plan. Patient verbalized understanding.     Subjective:       Patient ID: Lacey Jenkins Mondor is a 37 y.o. female.    Chief Complaint: Annual Exam    HPI    The patient is coming here today for an annual checkup.  The patient is been complaining of intermittent chest pressure and wants to make sure that everything is okay, the patient denies any severe pain, shortness of breath, diaphoresis associated with the patient also has iron deficiency but this improved from the last time.  The patient has been trying to eat healthy, being more active, she still losing weight.    Past medical history, past social history was reviewed and discussed with the  patient.    Review of Systems   Constitutional:  Negative for activity change, appetite change and chills.   HENT:  Negative for congestion and ear discharge.    Eyes:  Negative for discharge and itching.   Respiratory:  Negative for choking and chest tightness.    Cardiovascular:  Negative for palpitations and leg swelling.        Chest pressure   Gastrointestinal:  Negative for abdominal distention, abdominal pain and constipation.   Endocrine: Negative for cold intolerance and heat intolerance.   Genitourinary:  Negative for dysuria and flank pain.   Musculoskeletal:  Negative for arthralgias and back pain.   Skin:  Negative for pallor and rash.   Allergic/Immunologic: Negative for environmental allergies and food allergies.   Neurological:  Negative for dizziness, facial asymmetry and headaches.   Hematological:  Negative for adenopathy. Does not bruise/bleed easily.   Psychiatric/Behavioral:  Negative for agitation, confusion, decreased concentration and sleep disturbance.      Objective:      Physical Exam  Vitals and nursing note reviewed.   Constitutional:       General: She is not in acute distress.     Appearance: Normal appearance. She is well-developed. She is not diaphoretic.   HENT:      Head: Normocephalic and atraumatic.      Right Ear: External ear normal.      Left Ear: External ear normal.      Nose: Nose normal.      Mouth/Throat:      Pharynx: No oropharyngeal exudate.   Eyes:      General: No scleral icterus.        Right eye: No discharge.         Left eye: No discharge.      Conjunctiva/sclera: Conjunctivae normal.      Pupils: Pupils are equal, round, and reactive to light.   Cardiovascular:      Rate and Rhythm: Normal rate and regular rhythm.      Heart sounds: Normal heart sounds.   Pulmonary:      Effort: Pulmonary effort is normal. No respiratory distress.      Breath sounds: Normal breath sounds. No wheezing.   Abdominal:      General: Abdomen is flat. Bowel sounds are normal. There is  no distension.      Palpations: There is no mass.      Tenderness: There is no abdominal tenderness.   Musculoskeletal:         General: No tenderness.      Cervical back: Neck supple.   Skin:     Coloration: Skin is not pale.      Findings: No erythema.   Neurological:      Mental Status: She is alert.      Cranial Nerves: No cranial nerve deficit.   Psychiatric:         Behavior: Behavior normal.         Thought Content: Thought content normal.         Judgment: Judgment normal.

## 2022-10-29 ENCOUNTER — LAB VISIT (OUTPATIENT)
Dept: LAB | Facility: HOSPITAL | Age: 37
End: 2022-10-29
Attending: FAMILY MEDICINE
Payer: COMMERCIAL

## 2022-10-29 DIAGNOSIS — E61.1 IRON DEFICIENCY: ICD-10-CM

## 2022-10-29 DIAGNOSIS — Z00.01 ANNUAL VISIT FOR GENERAL ADULT MEDICAL EXAMINATION WITH ABNORMAL FINDINGS: ICD-10-CM

## 2022-10-29 LAB
ALBUMIN SERPL BCP-MCNC: 4.2 G/DL (ref 3.5–5.2)
ALP SERPL-CCNC: 62 U/L (ref 55–135)
ALT SERPL W/O P-5'-P-CCNC: 20 U/L (ref 10–44)
ANION GAP SERPL CALC-SCNC: 7 MMOL/L (ref 8–16)
AST SERPL-CCNC: 16 U/L (ref 10–40)
BASOPHILS # BLD AUTO: 0.07 K/UL (ref 0–0.2)
BASOPHILS NFR BLD: 1.2 % (ref 0–1.9)
BILIRUB SERPL-MCNC: 0.4 MG/DL (ref 0.1–1)
BUN SERPL-MCNC: 19 MG/DL (ref 6–20)
CALCIUM SERPL-MCNC: 8.8 MG/DL (ref 8.7–10.5)
CHLORIDE SERPL-SCNC: 109 MMOL/L (ref 95–110)
CHOLEST SERPL-MCNC: 197 MG/DL (ref 120–199)
CHOLEST/HDLC SERPL: 3.5 {RATIO} (ref 2–5)
CO2 SERPL-SCNC: 24 MMOL/L (ref 23–29)
CREAT SERPL-MCNC: 0.8 MG/DL (ref 0.5–1.4)
DIFFERENTIAL METHOD: ABNORMAL
EOSINOPHIL # BLD AUTO: 0.1 K/UL (ref 0–0.5)
EOSINOPHIL NFR BLD: 1.9 % (ref 0–8)
ERYTHROCYTE [DISTWIDTH] IN BLOOD BY AUTOMATED COUNT: 13.2 % (ref 11.5–14.5)
EST. GFR  (NO RACE VARIABLE): >60 ML/MIN/1.73 M^2
FERRITIN SERPL-MCNC: 35 NG/ML (ref 20–300)
GLUCOSE SERPL-MCNC: 96 MG/DL (ref 70–110)
HCT VFR BLD AUTO: 41.9 % (ref 37–48.5)
HDLC SERPL-MCNC: 57 MG/DL (ref 40–75)
HDLC SERPL: 28.9 % (ref 20–50)
HGB BLD-MCNC: 13.3 G/DL (ref 12–16)
IMM GRANULOCYTES # BLD AUTO: 0.01 K/UL (ref 0–0.04)
IMM GRANULOCYTES NFR BLD AUTO: 0.2 % (ref 0–0.5)
IRON SERPL-MCNC: 48 UG/DL (ref 30–160)
LDLC SERPL CALC-MCNC: 128.4 MG/DL (ref 63–159)
LYMPHOCYTES # BLD AUTO: 2.4 K/UL (ref 1–4.8)
LYMPHOCYTES NFR BLD: 42.1 % (ref 18–48)
MCH RBC QN AUTO: 28.7 PG (ref 27–31)
MCHC RBC AUTO-ENTMCNC: 31.7 G/DL (ref 32–36)
MCV RBC AUTO: 90 FL (ref 82–98)
MONOCYTES # BLD AUTO: 0.4 K/UL (ref 0.3–1)
MONOCYTES NFR BLD: 6.3 % (ref 4–15)
NEUTROPHILS # BLD AUTO: 2.7 K/UL (ref 1.8–7.7)
NEUTROPHILS NFR BLD: 48.3 % (ref 38–73)
NONHDLC SERPL-MCNC: 140 MG/DL
NRBC BLD-RTO: 0 /100 WBC
PLATELET # BLD AUTO: 401 K/UL (ref 150–450)
PMV BLD AUTO: 9.7 FL (ref 9.2–12.9)
POTASSIUM SERPL-SCNC: 4.2 MMOL/L (ref 3.5–5.1)
PROT SERPL-MCNC: 7 G/DL (ref 6–8.4)
RBC # BLD AUTO: 4.64 M/UL (ref 4–5.4)
SATURATED IRON: 14 % (ref 20–50)
SODIUM SERPL-SCNC: 140 MMOL/L (ref 136–145)
TOTAL IRON BINDING CAPACITY: 342 UG/DL (ref 250–450)
TRANSFERRIN SERPL-MCNC: 231 MG/DL (ref 200–375)
TRIGL SERPL-MCNC: 58 MG/DL (ref 30–150)
TSH SERPL DL<=0.005 MIU/L-ACNC: 2.31 UIU/ML (ref 0.4–4)
WBC # BLD AUTO: 5.68 K/UL (ref 3.9–12.7)

## 2022-10-29 PROCEDURE — 80053 COMPREHEN METABOLIC PANEL: CPT | Performed by: FAMILY MEDICINE

## 2022-10-29 PROCEDURE — 84443 ASSAY THYROID STIM HORMONE: CPT | Performed by: FAMILY MEDICINE

## 2022-10-29 PROCEDURE — 36415 COLL VENOUS BLD VENIPUNCTURE: CPT | Mod: PO | Performed by: FAMILY MEDICINE

## 2022-10-29 PROCEDURE — 82728 ASSAY OF FERRITIN: CPT | Performed by: FAMILY MEDICINE

## 2022-10-29 PROCEDURE — 84466 ASSAY OF TRANSFERRIN: CPT | Performed by: FAMILY MEDICINE

## 2022-10-29 PROCEDURE — 80061 LIPID PANEL: CPT | Performed by: FAMILY MEDICINE

## 2022-10-29 PROCEDURE — 85025 COMPLETE CBC W/AUTO DIFF WBC: CPT | Performed by: FAMILY MEDICINE

## 2023-01-20 ENCOUNTER — IMMUNIZATION (OUTPATIENT)
Dept: PHARMACY | Facility: CLINIC | Age: 38
End: 2023-01-20
Payer: COMMERCIAL

## 2023-01-20 DIAGNOSIS — Z23 NEED FOR VACCINATION: Primary | ICD-10-CM

## 2023-02-01 ENCOUNTER — PATIENT MESSAGE (OUTPATIENT)
Dept: ADMINISTRATIVE | Facility: OTHER | Age: 38
End: 2023-02-01
Payer: COMMERCIAL

## 2023-02-02 ENCOUNTER — OFFICE VISIT (OUTPATIENT)
Dept: SURGERY | Facility: CLINIC | Age: 38
End: 2023-02-02
Payer: COMMERCIAL

## 2023-02-02 VITALS
DIASTOLIC BLOOD PRESSURE: 85 MMHG | WEIGHT: 163.56 LBS | HEART RATE: 73 BPM | TEMPERATURE: 99 F | SYSTOLIC BLOOD PRESSURE: 155 MMHG | HEIGHT: 63 IN | BODY MASS INDEX: 28.98 KG/M2

## 2023-02-02 DIAGNOSIS — Z80.0 FAMILY HISTORY OF COLON CANCER: Primary | ICD-10-CM

## 2023-02-02 DIAGNOSIS — Z12.11 SCREENING FOR COLON CANCER: ICD-10-CM

## 2023-02-02 PROCEDURE — 99213 PR OFFICE/OUTPT VISIT, EST, LEVL III, 20-29 MIN: ICD-10-PCS | Mod: S$GLB,,, | Performed by: SURGERY

## 2023-02-02 PROCEDURE — 3079F DIAST BP 80-89 MM HG: CPT | Mod: CPTII,S$GLB,, | Performed by: SURGERY

## 2023-02-02 PROCEDURE — 3079F PR MOST RECENT DIASTOLIC BLOOD PRESSURE 80-89 MM HG: ICD-10-PCS | Mod: CPTII,S$GLB,, | Performed by: SURGERY

## 2023-02-02 PROCEDURE — 99999 PR PBB SHADOW E&M-EST. PATIENT-LVL IV: CPT | Mod: PBBFAC,,, | Performed by: SURGERY

## 2023-02-02 PROCEDURE — 3077F SYST BP >= 140 MM HG: CPT | Mod: CPTII,S$GLB,, | Performed by: SURGERY

## 2023-02-02 PROCEDURE — 3077F PR MOST RECENT SYSTOLIC BLOOD PRESSURE >= 140 MM HG: ICD-10-PCS | Mod: CPTII,S$GLB,, | Performed by: SURGERY

## 2023-02-02 PROCEDURE — 1159F MED LIST DOCD IN RCRD: CPT | Mod: CPTII,S$GLB,, | Performed by: SURGERY

## 2023-02-02 PROCEDURE — 3008F PR BODY MASS INDEX (BMI) DOCUMENTED: ICD-10-PCS | Mod: CPTII,S$GLB,, | Performed by: SURGERY

## 2023-02-02 PROCEDURE — 1159F PR MEDICATION LIST DOCUMENTED IN MEDICAL RECORD: ICD-10-PCS | Mod: CPTII,S$GLB,, | Performed by: SURGERY

## 2023-02-02 PROCEDURE — 3008F BODY MASS INDEX DOCD: CPT | Mod: CPTII,S$GLB,, | Performed by: SURGERY

## 2023-02-02 PROCEDURE — 99999 PR PBB SHADOW E&M-EST. PATIENT-LVL IV: ICD-10-PCS | Mod: PBBFAC,,, | Performed by: SURGERY

## 2023-02-02 PROCEDURE — 99213 OFFICE O/P EST LOW 20 MIN: CPT | Mod: S$GLB,,, | Performed by: SURGERY

## 2023-02-02 RX ORDER — UBROGEPANT 100 MG/1
1 TABLET ORAL
COMMUNITY
Start: 2022-09-29

## 2023-02-02 NOTE — PATIENT INSTRUCTIONS
Colonoscopy is scheduled for 3/7/23 the arrival time will be given to you by the preop nurse.  The preop nurse will call you from [phone]  Fasting after midnight.  Someone to drive home.        THE PREOP NURSE WILL CALL, SOMETIMES AS LATE AS 4 PM IN THE AFTERNOON THE DAY BEFORE SURGERY.        Special Instruction:   Bowel Prep for Colonoscopy/Surgery  Purchase:  Dulcolax Pills (Laxative) 4 tablets  14 dose bottle of Miralax Powder  64 ounces of Gatorade or Powerade    The day before your procedure no solid foods, clear liquids only to include water, Gatorade,Powerade.  Coffee no creamer  Soft Drinks  Popsicles   Jello   Jose Aid  Chicken or beef broth  Apple juice, white grape juice, Tea,   Hard Candy    NO RED OR PURPLE COLORED LIQUIDS, JELLO, POPSCILES.  Avoid Ibuprofen,  Advil or Aleve 7 days before procedure.    Mix the entire 14 dose bottle of Miralax into  64 ounces of Gatorade into a large pitcher, stir and chill until ready to use.  The day before procedure starting at noon take all 4 Dulcolax tablets followed by clear liquids until 2pm.  Keep in mind to drink plenty of fluids this is how the laxatives work, plenty fluids.  After 2pm no later than 4pm start drinkg the Miralax/Gatorade mix 8 ounces at a time over a 2-4 hour period until completing the entire 64 ounces, one glass every 15 to 30 minutes.  Keep sipping clear liquids between each dose  Contact your Diabetes doctor or Endocrinologist for detailed instruction regarding your insulin or oral diabetes medication.     Contact Dr. Tompkins if you have any questions, [phone]    Be Sure to Notify Your Doctor if You are on a Prescription Blood Thinner.    Your procedure/surgery is scheduled at the [facility] at [address] [facility] [address] in [place] on the first floor. Entrance 2.       Contact Ramón Gurrola LPN for any questions or concerns. [phone]

## 2023-02-02 NOTE — PROGRESS NOTES
37-year-old female returns discuss colonoscopy and hemorrhoids.  Patient does have significant concern for colon cancer.  She has a strong family history of colon polyps as well as colon cancer.  Her maternal grandmother passed away from colon cancer at the age of 49.  Her mother her mother has had multiple polyps in the past.  As such she is requesting a colonoscopy.  She is also concerned about her hemorrhoids.  She notes that since pregnancy or hemorrhoids appear to be larger and will frequently prolapse.  They cause discomfort and difficulty with bowel movements as well as hygiene.  She denies any significant bleeding.  No significant changes in bowel habits otherwise.  No significant changes in her overall health.    Afebrile vital signs stable   Awake alert orient x3   Abdomen:  Soft nontender nondistended     Assessment: Family history of colon cancer     Discussion with patient.  I have offered to perform a colonoscopy.  At the time of colonoscopy will assess her hemorrhoids.  If she does have large hemorrhoids appear to be prone to prolapse would consider banding which she favors.

## 2023-02-03 DIAGNOSIS — Z12.11 SCREEN FOR COLON CANCER: Primary | ICD-10-CM

## 2023-02-03 RX ORDER — SODIUM CHLORIDE 0.9 % (FLUSH) 0.9 %
10 SYRINGE (ML) INJECTION
Status: CANCELLED | OUTPATIENT
Start: 2023-02-03

## 2023-02-03 RX ORDER — SODIUM CHLORIDE, SODIUM LACTATE, POTASSIUM CHLORIDE, CALCIUM CHLORIDE 600; 310; 30; 20 MG/100ML; MG/100ML; MG/100ML; MG/100ML
INJECTION, SOLUTION INTRAVENOUS CONTINUOUS
Status: CANCELLED | OUTPATIENT
Start: 2023-02-03

## 2023-02-07 RX ORDER — SODIUM PICOSULFATE, MAGNESIUM OXIDE, AND ANHYDROUS CITRIC ACID 10; 3.5; 12 MG/160ML; G/160ML; G/160ML
1 LIQUID ORAL SEE ADMIN INSTRUCTIONS
Qty: 320 ML | Refills: 0 | Status: SHIPPED | OUTPATIENT
Start: 2023-02-07 | End: 2023-11-29

## 2023-02-28 ENCOUNTER — PATIENT MESSAGE (OUTPATIENT)
Dept: OTHER | Facility: OTHER | Age: 38
End: 2023-02-28
Payer: COMMERCIAL

## 2023-02-28 DIAGNOSIS — Z82.49 FAMILY HISTORY OF HEART DISEASE: ICD-10-CM

## 2023-02-28 DIAGNOSIS — Z13.6 ENCOUNTER FOR SCREENING FOR CARDIOVASCULAR DISORDERS: Primary | ICD-10-CM

## 2023-02-28 DIAGNOSIS — I10 PRIMARY HYPERTENSION: ICD-10-CM

## 2023-02-28 DIAGNOSIS — E78.49 OTHER HYPERLIPIDEMIA: ICD-10-CM

## 2023-03-07 ENCOUNTER — HOSPITAL ENCOUNTER (OUTPATIENT)
Facility: HOSPITAL | Age: 38
Discharge: HOME | End: 2023-03-07
Attending: SURGERY | Admitting: SURGERY
Payer: COMMERCIAL

## 2023-03-07 ENCOUNTER — ANESTHESIA EVENT (OUTPATIENT)
Dept: ENDOSCOPY | Facility: HOSPITAL | Age: 38
End: 2023-03-07
Payer: COMMERCIAL

## 2023-03-07 ENCOUNTER — ANESTHESIA (OUTPATIENT)
Dept: ENDOSCOPY | Facility: HOSPITAL | Age: 38
End: 2023-03-07
Payer: COMMERCIAL

## 2023-03-07 DIAGNOSIS — Z80.0 FAMILY HISTORY OF COLON CANCER: ICD-10-CM

## 2023-03-07 DIAGNOSIS — Z12.11 SCREENING FOR COLON CANCER: ICD-10-CM

## 2023-03-07 LAB
B-HCG UR QL: NEGATIVE
CTP QC/QA: YES

## 2023-03-07 PROCEDURE — 81025 URINE PREGNANCY TEST: CPT | Mod: PO | Performed by: SURGERY

## 2023-03-07 PROCEDURE — D9220A PRA ANESTHESIA: Mod: PT,CRNA,, | Performed by: NURSE ANESTHETIST, CERTIFIED REGISTERED

## 2023-03-07 PROCEDURE — 37000008 HC ANESTHESIA 1ST 15 MINUTES: Mod: PO | Performed by: SURGERY

## 2023-03-07 PROCEDURE — D9220A PRA ANESTHESIA: ICD-10-PCS | Mod: PT,CRNA,, | Performed by: NURSE ANESTHETIST, CERTIFIED REGISTERED

## 2023-03-07 PROCEDURE — 25000003 PHARM REV CODE 250: Mod: PO | Performed by: NURSE ANESTHETIST, CERTIFIED REGISTERED

## 2023-03-07 PROCEDURE — 27201022: Mod: PO | Performed by: SURGERY

## 2023-03-07 PROCEDURE — 63600175 PHARM REV CODE 636 W HCPCS: Mod: PO | Performed by: NURSE ANESTHETIST, CERTIFIED REGISTERED

## 2023-03-07 PROCEDURE — 63600175 PHARM REV CODE 636 W HCPCS: Mod: PO | Performed by: SURGERY

## 2023-03-07 PROCEDURE — D9220A PRA ANESTHESIA: ICD-10-PCS | Mod: PT,ANES,, | Performed by: ANESTHESIOLOGY

## 2023-03-07 PROCEDURE — 45398 COLONOSCOPY W/BAND LIGATION: CPT | Mod: ,,, | Performed by: SURGERY

## 2023-03-07 PROCEDURE — 45398: ICD-10-PCS | Mod: PT,,, | Performed by: SURGERY

## 2023-03-07 PROCEDURE — D9220A PRA ANESTHESIA: Mod: PT,ANES,, | Performed by: ANESTHESIOLOGY

## 2023-03-07 PROCEDURE — 45398 COLONOSCOPY W/BAND LIGATION: CPT

## 2023-03-07 PROCEDURE — 37000009 HC ANESTHESIA EA ADD 15 MINS: Mod: PO | Performed by: SURGERY

## 2023-03-07 RX ORDER — PROPOFOL 10 MG/ML
VIAL (ML) INTRAVENOUS
Status: DISCONTINUED | OUTPATIENT
Start: 2023-03-07 | End: 2023-03-07

## 2023-03-07 RX ORDER — FENTANYL CITRATE 50 UG/ML
INJECTION, SOLUTION INTRAMUSCULAR; INTRAVENOUS
Status: DISCONTINUED | OUTPATIENT
Start: 2023-03-07 | End: 2023-03-07

## 2023-03-07 RX ORDER — PROPOFOL 10 MG/ML
VIAL (ML) INTRAVENOUS CONTINUOUS PRN
Status: DISCONTINUED | OUTPATIENT
Start: 2023-03-07 | End: 2023-03-07

## 2023-03-07 RX ORDER — SODIUM CHLORIDE, SODIUM LACTATE, POTASSIUM CHLORIDE, CALCIUM CHLORIDE 600; 310; 30; 20 MG/100ML; MG/100ML; MG/100ML; MG/100ML
INJECTION, SOLUTION INTRAVENOUS CONTINUOUS
Status: DISCONTINUED | OUTPATIENT
Start: 2023-03-07 | End: 2023-03-07 | Stop reason: HOSPADM

## 2023-03-07 RX ORDER — SODIUM CHLORIDE 0.9 % (FLUSH) 0.9 %
10 SYRINGE (ML) INJECTION
Status: DISCONTINUED | OUTPATIENT
Start: 2023-03-07 | End: 2023-03-07 | Stop reason: HOSPADM

## 2023-03-07 RX ORDER — LIDOCAINE HCL/PF 100 MG/5ML
SYRINGE (ML) INTRAVENOUS
Status: DISCONTINUED | OUTPATIENT
Start: 2023-03-07 | End: 2023-03-07

## 2023-03-07 RX ADMIN — PROPOFOL 75 MG: 10 INJECTION, EMULSION INTRAVENOUS at 11:03

## 2023-03-07 RX ADMIN — PROPOFOL 100 MCG/KG/MIN: 10 INJECTION, EMULSION INTRAVENOUS at 11:03

## 2023-03-07 RX ADMIN — FENTANYL CITRATE 50 MCG: 50 INJECTION, SOLUTION INTRAMUSCULAR; INTRAVENOUS at 11:03

## 2023-03-07 RX ADMIN — SODIUM CHLORIDE, SODIUM LACTATE, POTASSIUM CHLORIDE, AND CALCIUM CHLORIDE: .6; .31; .03; .02 INJECTION, SOLUTION INTRAVENOUS at 10:03

## 2023-03-07 RX ADMIN — LIDOCAINE HYDROCHLORIDE 100 MG: 20 INJECTION, SOLUTION INTRAVENOUS at 11:03

## 2023-03-07 NOTE — ANESTHESIA PREPROCEDURE EVALUATION
03/07/2023  Lacey Jenkins Mondor is a 37 y.o., female.      Pre-op Assessment    I have reviewed the Patient Summary Reports.     I have reviewed the Nursing Notes. I have reviewed the NPO Status.   I have reviewed the Medications.     Review of Systems  Anesthesia Hx:  No problems with previous Anesthesia    Social:  Non-Smoker    Cardiovascular:   Hypertension, well controlled    Pulmonary:  Pulmonary Normal    Renal/:  Renal/ Normal     Neurological:  Neurology Normal    Endocrine:  Endocrine Normal Pancreatitis   Psych:   Psychiatric History depression          Physical Exam  General: Well nourished, Cooperative, Alert and Oriented    Airway:  Mallampati: II   Mouth Opening: Normal  TM Distance: Normal  Neck ROM: Normal ROM        Anesthesia Plan  Type of Anesthesia, risks & benefits discussed:    Anesthesia Type: Gen ETT, Gen Supraglottic Airway, Gen Natural Airway, MAC  Intra-op Monitoring Plan: Standard ASA Monitors  Post Op Pain Control Plan: multimodal analgesia  Induction:  IV  Airway Plan: Direct, Video and Fiberoptic, Post-Induction  Informed Consent: Informed consent signed with the Patient and all parties understand the risks and agree with anesthesia plan.  All questions answered.   ASA Score: 2    Ready For Surgery From Anesthesia Perspective.     .

## 2023-03-07 NOTE — PROVATION PATIENT INSTRUCTIONS
Discharge Summary/Instructions after an Endoscopic Procedure  Patient Name: Lacey Mondor  Patient MRN: [id number]  Patient YOB: 1985 Tuesday, March 7, 2023  Praneeth Tompkins MD  Dear patient,  As a result of recent federal legislation (The Federal Cures Act), you may   receive lab or pathology results from your procedure in your [facility]   account before your physician is able to contact you. Your physician or   their representative will relay the results to you with their   recommendations at their soonest availability.  Thank you,  RESTRICTIONS:  During your procedure today, you received medications for sedation.  These   medications may affect your judgment, balance and coordination.  Therefore,   for 24 hours, you have the following restrictions:   - DO NOT drive a car, operate machinery, make legal/financial decisions,   sign important papers or drink alcohol.    ACTIVITY:  Today: no heavy lifting, straining or running due to procedural   sedation/anesthesia.  The following day: return to full activity including work.  DIET:  Eat and drink normally unless instructed otherwise.     TREATMENT FOR COMMON SIDE EFFECTS:  - Mild abdominal pain, nausea, belching, bloating or excessive gas:  rest,   eat lightly and use a heating pad.  - Sore Throat: treat with throat lozenges and/or gargle with warm salt   water.  - Because air was used during the procedure, expelling large amounts of air   from your rectum or belching is normal.  - If a bowel prep was taken, you may not have a bowel movement for 1-3 days.    This is normal.  SYMPTOMS TO WATCH FOR AND REPORT TO YOUR PHYSICIAN:  1. Abdominal pain or bloating, other than gas cramps.  2. Chest pain.  3. Back pain.  4. Signs of infection such as: chills or fever occurring within 24 hours   after the procedure.  5. Rectal bleeding, which would show as bright red, maroon, or black stools.   (A tablespoon of blood from the rectum is not serious, especially if    hemorrhoids are present.)  6. Vomiting.  7. Weakness or dizziness.  GO DIRECTLY TO THE NEAREST EMERGENCY ROOM IF YOU HAVE ANY OF THE FOLLOWING:      Difficulty breathing              Chills and/or fever over 101 F   Persistent vomiting and/or vomiting blood   Severe abdominal pain   Severe chest pain   Black, tarry stools   Bleeding- more than one tablespoon   Any other symptom or condition that you feel may need urgent attention  Your doctor recommends these additional instructions:  If any biopsies were taken, your doctors clinic will contact you in 1 to 2   weeks with any results.  You are being discharged to home.   Resume your regular diet.   Continue your present medications.   We are waiting for your pathology results.   Your physician has recommended a repeat colonoscopy in five years for   screening purposes.   Return to my office as needed.  For questions, problems or results please call your physician - Praneeth Tompkins MD at Work:  [phone].  EMERGENCY PHONE NUMBER: [phone], LAB RESULTS: [phone]  IF A COMPLICATION OR EMERGENCY SITUATION ARISES AND YOU ARE UNABLE TO REACH   YOUR PHYSICIAN - GO DIRECTLY TO THE EMERGENCY ROOM.  ___________________________________________  Nurse Signature  ___________________________________________  Patient/Designated Responsible Party Signature  Praneeth Tompkins MD  3/7/2023 11:38:42 AM  This report has been verified and signed electronically.  Dear patient,  As a result of recent federal legislation (The Federal Cures Act), you may   receive lab or pathology results from your procedure in your [facility]   account before your physician is able to contact you. Your physician or   their representative will relay the results to you with their   recommendations at their soonest availability.  Thank you.  PROVATION

## 2023-03-07 NOTE — H&P
"[facility] - Endoscopy  History & Physical     Subjective:     Chief Complaint/Reason for Admission: colonoscopy    History of Present Illness:   Patient 37 y.o. female presents for colonoscopy.   Notes strong family history in her maternal grandmother.  Pt also notes hemorrhoid prolapse concerning for her..    Patient Active Problem List    Diagnosis Date Noted    HTN complicating peripregnancy, antepartum, third trimester 09/13/2021    Breast feeding status of mother 08/19/2019    Fluttering sensation of heart 09/20/2016    Goiter 01/07/2016    Abnormal thyroid stimulating hormone (TSH) level 01/07/2016    Allergic rhinitis due to pollen 01/07/2016    Post-op pain 06/25/2012        Medications Prior to Admission   Medication Sig Dispense Refill Last Dose    buPROPion (WELLBUTRIN XL) 150 MG TB24 tablet Take 2 tablets (300 mg total) by mouth once daily. 180 tablet 3 3/6/2023    sod picosulf-mag ox-citric ac (CLENPIQ) 10 mg-3.5 gram -12 gram/160 mL Soln Take 1 kit by mouth As instructed (Follow Insert). 320 mL 0 3/7/2023    triamcinolone acetonide 0.1% (KENALOG) 0.1 % cream Apply topically 2 (two) times daily. hands (Patient not taking: Reported on 2/2/2023) 453.6 g 1     ubrogepant (UBRELVY) 100 mg tablet Take 1 tablet by mouth as needed.   More than a month     Review of patient's allergies indicates:   Allergen Reactions    Latex, natural rubber Other (See Comments)     pain    Morphine Other (See Comments)     "burns like hell when they put it in"    Oxycodone-acetaminophen Blisters    Percocet [oxycodone-acetaminophen] Itching        Past Medical History:   Diagnosis Date    Abnormal thyroid stimulating hormone (TSH) level     Depression     wellbutrin    Goiter     Ovarian cyst     Pancreatitis     Preeclampsia       Past Surgical History:   Procedure Laterality Date    DILATION AND CURETTAGE OF UTERUS      ECTOPIC PREGNANCY SURGERY Left     laparoscopy    Laparoscopic left salpingectomy        Family History "   Problem Relation Age of Onset    Colon polyps Mother     Colon cancer Maternal Grandmother     Diabetes Paternal Grandmother     Heart failure Paternal Grandmother     Glaucoma Paternal Grandmother     Breast cancer Neg Hx     Ovarian cancer Neg Hx       Social History     Tobacco Use    Smoking status: Never    Smokeless tobacco: Never   Substance Use Topics    Alcohol use: No     Comment: rare social        Review of Systems:  A comprehensive review of systems was negative.    OBJECTIVE:     Patient Vitals for the past 8 hrs:   BP Temp Temp src Pulse Resp SpO2   03/07/23 1048 139/78 97.5 °F (36.4 °C) Temporal 73 17 97 %     AAOx3  CTA B  Soft/nt/nd    Data Review:      ASSESSMENT/PLAN:     There are no hospital problems to display for this patient.  Cscope    Plan:  To have cscope and possible banding

## 2023-03-08 VITALS
BODY MASS INDEX: 28.88 KG/M2 | HEIGHT: 63 IN | WEIGHT: 163 LBS | TEMPERATURE: 98 F | OXYGEN SATURATION: 97 % | SYSTOLIC BLOOD PRESSURE: 119 MMHG | DIASTOLIC BLOOD PRESSURE: 62 MMHG | RESPIRATION RATE: 16 BRPM | HEART RATE: 69 BPM

## 2023-03-08 NOTE — ANESTHESIA POSTPROCEDURE EVALUATION
Anesthesia Post Evaluation    Patient: Lacey Jenkins Mondor    Procedure(s) Performed: Procedure(s) (LRB):  COLONOSCOPY (N/A)    Final Anesthesia Type: general      Patient location during evaluation: PACU  Patient participation: Yes- Able to Participate  Level of consciousness: awake and alert and oriented  Post-procedure vital signs: reviewed and stable  Pain management: adequate  Airway patency: patent    PONV status at discharge: No PONV  Anesthetic complications: no      Cardiovascular status: blood pressure returned to baseline and stable  Respiratory status: unassisted and spontaneous ventilation  Hydration status: euvolemic  Follow-up not needed.          Vitals Value Taken Time   /62 03/07/23 1220   Temp   03/08/23 1031   Pulse 69 03/07/23 1220   Resp 16 03/07/23 1220   SpO2 97 % 03/07/23 1220         Event Time   Out of Recovery 12:26:00         Pain/Shagufta Score: Shagufta Score: 10 (3/7/2023 12:12 PM)

## 2023-06-09 DIAGNOSIS — F41.9 ANXIETY: ICD-10-CM

## 2023-06-09 NOTE — TELEPHONE ENCOUNTER
No care due was identified.  [facility] Embedded Care Due Messages. Reference number: [id number].   6/09/2023 5:08:19 AM CDT

## 2023-06-09 NOTE — TELEPHONE ENCOUNTER
Refill Routing Note   Medication(s) are not appropriate for processing by [facility] for the following reason(s):      Patient a [employer] employee    [facility] action(s):  Route Care Due:  None identified          Appointments  past 12m or future 3m with PCP    Date Provider   Last Visit   10/20/2022 Sandra Andersen MD   Next Visit   10/20/2023 Sandra Andersen MD   ED visits in past 90 days: 0        Note composed:10:59 AM 06/09/2023

## 2023-06-12 RX ORDER — BUPROPION HYDROCHLORIDE 150 MG/1
300 TABLET ORAL DAILY
Qty: 180 TABLET | Refills: 3 | Status: SHIPPED | OUTPATIENT
Start: 2023-06-12 | End: 2024-06-11

## 2023-06-15 ENCOUNTER — PATIENT MESSAGE (OUTPATIENT)
Dept: OTHER | Facility: OTHER | Age: 38
End: 2023-06-15
Payer: COMMERCIAL

## 2023-06-28 ENCOUNTER — PATIENT MESSAGE (OUTPATIENT)
Dept: OTHER | Facility: OTHER | Age: 38
End: 2023-06-28
Payer: COMMERCIAL

## 2023-07-13 ENCOUNTER — PATIENT MESSAGE (OUTPATIENT)
Dept: ADMINISTRATIVE | Facility: OTHER | Age: 38
End: 2023-07-13
Payer: COMMERCIAL

## 2023-08-11 ENCOUNTER — TELEPHONE (OUTPATIENT)
Dept: FAMILY MEDICINE | Facility: CLINIC | Age: 38
End: 2023-08-11
Payer: COMMERCIAL

## 2023-08-11 DIAGNOSIS — Z12.31 ENCOUNTER FOR SCREENING MAMMOGRAM FOR MALIGNANT NEOPLASM OF BREAST: Primary | ICD-10-CM

## 2023-08-11 NOTE — TELEPHONE ENCOUNTER
----- Message from Magalie Ayoub sent at 8/11/2023 10:31 AM CDT -----  Type: Needs Medical Advice  Who Called:  pt  Symptoms (please be specific):  pt said she need order for a mammo--please call and advise  Best Call Back Number: [phone]   Additional Information: pt said she can be reached through teams as well--thank you

## 2023-08-16 ENCOUNTER — PATIENT MESSAGE (OUTPATIENT)
Dept: FAMILY MEDICINE | Facility: CLINIC | Age: 38
End: 2023-08-16
Payer: COMMERCIAL

## 2023-08-16 ENCOUNTER — HOSPITAL ENCOUNTER (OUTPATIENT)
Dept: RADIOLOGY | Facility: HOSPITAL | Age: 38
Discharge: HOME | End: 2023-08-16
Attending: FAMILY MEDICINE
Payer: COMMERCIAL

## 2023-08-16 DIAGNOSIS — Z12.31 ENCOUNTER FOR SCREENING MAMMOGRAM FOR MALIGNANT NEOPLASM OF BREAST: ICD-10-CM

## 2023-08-16 PROCEDURE — 77067 SCR MAMMO BI INCL CAD: CPT | Mod: TC,PO

## 2023-08-16 PROCEDURE — 77067 SCR MAMMO BI INCL CAD: CPT | Mod: 26,,, | Performed by: RADIOLOGY

## 2023-08-16 PROCEDURE — 77063 MAMMO DIGITAL SCREENING BILAT WITH TOMO: ICD-10-PCS | Mod: 26,,, | Performed by: RADIOLOGY

## 2023-08-16 PROCEDURE — 77063 BREAST TOMOSYNTHESIS BI: CPT | Mod: 26,,, | Performed by: RADIOLOGY

## 2023-08-16 PROCEDURE — 77067 MAMMO DIGITAL SCREENING BILAT WITH TOMO: ICD-10-PCS | Mod: 26,,, | Performed by: RADIOLOGY

## 2023-08-16 NOTE — TELEPHONE ENCOUNTER
Her history has been updated and I have sent her a message to confirm. She is scheduled for additional testing 8/17.

## 2023-08-17 ENCOUNTER — HOSPITAL ENCOUNTER (OUTPATIENT)
Dept: RADIOLOGY | Facility: HOSPITAL | Age: 38
Discharge: HOME | End: 2023-08-17
Attending: FAMILY MEDICINE
Payer: COMMERCIAL

## 2023-08-17 DIAGNOSIS — R92.8 ABNORMAL MAMMOGRAM OF RIGHT BREAST: ICD-10-CM

## 2023-08-17 PROCEDURE — 77061 MAMMO DIGITAL DIAGNOSTIC RIGHT WITH TOMO: ICD-10-PCS | Mod: 26,RT,, | Performed by: RADIOLOGY

## 2023-08-17 PROCEDURE — 77065 MAMMO DIGITAL DIAGNOSTIC RIGHT WITH TOMO: ICD-10-PCS | Mod: 26,RT,, | Performed by: RADIOLOGY

## 2023-08-17 PROCEDURE — 76642 US BREAST RIGHT LIMITED: ICD-10-PCS | Mod: 26,RT,, | Performed by: RADIOLOGY

## 2023-08-17 PROCEDURE — 77061 BREAST TOMOSYNTHESIS UNI: CPT | Mod: TC,PO,RT

## 2023-08-17 PROCEDURE — 76642 ULTRASOUND BREAST LIMITED: CPT | Mod: TC,PO,RT

## 2023-08-17 PROCEDURE — 77065 DX MAMMO INCL CAD UNI: CPT | Mod: 26,RT,, | Performed by: RADIOLOGY

## 2023-08-17 PROCEDURE — 76642 ULTRASOUND BREAST LIMITED: CPT | Mod: 26,RT,, | Performed by: RADIOLOGY

## 2023-08-17 PROCEDURE — 77061 BREAST TOMOSYNTHESIS UNI: CPT | Mod: 26,RT,, | Performed by: RADIOLOGY

## 2023-08-21 DIAGNOSIS — R92.8 ABNORMALITY OF RIGHT BREAST ON SCREENING MAMMOGRAM: Primary | ICD-10-CM

## 2023-09-09 ENCOUNTER — PATIENT MESSAGE (OUTPATIENT)
Dept: FAMILY MEDICINE | Facility: CLINIC | Age: 38
End: 2023-09-09
Payer: COMMERCIAL

## 2023-10-19 ENCOUNTER — PATIENT MESSAGE (OUTPATIENT)
Dept: FAMILY MEDICINE | Facility: CLINIC | Age: 38
End: 2023-10-19
Payer: COMMERCIAL

## 2023-10-24 ENCOUNTER — OFFICE VISIT (OUTPATIENT)
Dept: FAMILY MEDICINE | Facility: CLINIC | Age: 38
End: 2023-10-24
Payer: COMMERCIAL

## 2023-10-24 DIAGNOSIS — F33.1 MODERATE EPISODE OF RECURRENT MAJOR DEPRESSIVE DISORDER: ICD-10-CM

## 2023-10-24 DIAGNOSIS — Z63.4 RECENT BEREAVEMENT: Primary | ICD-10-CM

## 2023-10-24 PROCEDURE — 99213 PR OFFICE/OUTPT VISIT, EST, LEVL III, 20-29 MIN: ICD-10-PCS | Mod: 95,,, | Performed by: FAMILY MEDICINE

## 2023-10-24 PROCEDURE — 99213 OFFICE O/P EST LOW 20 MIN: CPT | Mod: 95,,, | Performed by: FAMILY MEDICINE

## 2023-10-24 SDOH — SOCIAL DETERMINANTS OF HEALTH (SDOH): DISSAPEARANCE AND DEATH OF FAMILY MEMBER: Z63.4

## 2023-11-07 PROBLEM — F33.1 MODERATE EPISODE OF RECURRENT MAJOR DEPRESSIVE DISORDER: Status: ACTIVE | Noted: 2023-11-07

## 2023-11-07 PROBLEM — Z63.4 RECENT BEREAVEMENT: Status: ACTIVE | Noted: 2023-11-07

## 2023-11-07 NOTE — PROGRESS NOTES
Assessment:       1. Recent bereavement    2. Moderate episode of recurrent major depressive disorder        Plan:       Recent bereavement:  New problem, no further workup needed    Moderate episode of recurrent major depressive disorder:  Stable         Will wait for the [place] paperwork so we can fill that out for patient.   Patient agreed with assessment and plan. Patient verbalized understanding.     Subjective:       Patient ID: Lacey Jenkins Mondor is a 38 y.o. female.    Chief Complaint: FMLA paperwork    HPI    The patient location is:  [place]  The chief complaint leading to consultation is:  Bereavement    Visit type: audiovisual    Face to Face time with patient: 10 minutes  15 minutes of total time spent on the encounter, which includes face to face time and non-face to face time preparing to see the patient (eg, review of tests), Obtaining and/or reviewing separately obtained history, Documenting clinical information in the electronic or other health record, Independently interpreting results (not separately reported) and communicating results to the patient/family/caregiver, or Care coordination (not separately reported).         Each patient to whom he or she provides medical services by telemedicine is:  (1) informed of the relationship between the physician and patient and the respective role of any other health care provider with respect to management of the patient; and (2) notified that he or she may decline to receive medical services by telemedicine and may withdraw from such care at any time.    Notes:  Patient here today to complete [place] paperwork.  The patient stated that her dad passed away recently and she missed few days of work.  The patient stated that she is feeling okay for now, denies any symptoms of chest pain, shortness for breath, nausea, vomiting, abdominal pain.  Patient is currently taking Wellbutrin for depression.    Past medical history, past social history was reviewed and  discussed with the patient.    Review of Systems   Constitutional:  Negative for activity change and unexpected weight change.   HENT:  Negative for hearing loss, rhinorrhea and trouble swallowing.    Eyes:  Negative for discharge and visual disturbance.   Respiratory:  Negative for chest tightness and wheezing.    Cardiovascular:  Positive for palpitations. Negative for chest pain.   Gastrointestinal:  Negative for blood in stool, constipation, diarrhea and vomiting.   Endocrine: Negative for polydipsia and polyuria.   Genitourinary:  Negative for difficulty urinating, dysuria, hematuria and menstrual problem.   Musculoskeletal:  Negative for arthralgias, joint swelling and neck pain.   Neurological:  Negative for weakness and headaches.   Psychiatric/Behavioral:  Negative for confusion and dysphoric mood.        Objective:      Physical Exam  Constitutional:       Appearance: Normal appearance.   HENT:      Head: Normocephalic and atraumatic.   Neurological:      Mental Status: She is alert.   Psychiatric:         Mood and Affect: Mood normal.         Behavior: Behavior normal.         Thought Content: Thought content normal.         Judgment: Judgment normal.

## 2023-11-29 ENCOUNTER — LAB VISIT (OUTPATIENT)
Dept: LAB | Facility: HOSPITAL | Age: 38
End: 2023-11-29
Attending: FAMILY MEDICINE
Payer: COMMERCIAL

## 2023-11-29 ENCOUNTER — OFFICE VISIT (OUTPATIENT)
Dept: FAMILY MEDICINE | Facility: CLINIC | Age: 38
End: 2023-11-29
Payer: COMMERCIAL

## 2023-11-29 VITALS
HEIGHT: 63 IN | DIASTOLIC BLOOD PRESSURE: 88 MMHG | BODY MASS INDEX: 32.11 KG/M2 | HEART RATE: 74 BPM | OXYGEN SATURATION: 97 % | SYSTOLIC BLOOD PRESSURE: 132 MMHG | WEIGHT: 181.19 LBS

## 2023-11-29 DIAGNOSIS — I10 ESSENTIAL HYPERTENSION: ICD-10-CM

## 2023-11-29 DIAGNOSIS — R82.998 DARK URINE: ICD-10-CM

## 2023-11-29 DIAGNOSIS — Z00.01 ANNUAL VISIT FOR GENERAL ADULT MEDICAL EXAMINATION WITH ABNORMAL FINDINGS: Primary | ICD-10-CM

## 2023-11-29 DIAGNOSIS — Z13.1 SCREENING FOR DIABETES MELLITUS: ICD-10-CM

## 2023-11-29 DIAGNOSIS — Z23 NEED FOR VACCINATION: ICD-10-CM

## 2023-11-29 DIAGNOSIS — E61.1 IRON DEFICIENCY: ICD-10-CM

## 2023-11-29 DIAGNOSIS — Z00.01 ANNUAL VISIT FOR GENERAL ADULT MEDICAL EXAMINATION WITH ABNORMAL FINDINGS: ICD-10-CM

## 2023-11-29 DIAGNOSIS — N39.0 BACTERIAL UTI: Primary | ICD-10-CM

## 2023-11-29 DIAGNOSIS — A49.9 BACTERIAL UTI: Primary | ICD-10-CM

## 2023-11-29 LAB
25(OH)D3+25(OH)D2 SERPL-MCNC: 20 NG/ML (ref 30–96)
ALBUMIN SERPL BCP-MCNC: 4.3 G/DL (ref 3.5–5.2)
ALP SERPL-CCNC: 74 U/L (ref 55–135)
ALT SERPL W/O P-5'-P-CCNC: 9 U/L (ref 10–44)
ANION GAP SERPL CALC-SCNC: 10 MMOL/L (ref 8–16)
AST SERPL-CCNC: 13 U/L (ref 10–40)
BACTERIA #/AREA URNS HPF: ABNORMAL /HPF
BASOPHILS # BLD AUTO: 0.07 K/UL (ref 0–0.2)
BASOPHILS NFR BLD: 0.8 % (ref 0–1.9)
BILIRUB SERPL-MCNC: 0.5 MG/DL (ref 0.1–1)
BILIRUB UR QL STRIP: NEGATIVE
BUN SERPL-MCNC: 18 MG/DL (ref 6–20)
CALCIUM SERPL-MCNC: 9.5 MG/DL (ref 8.7–10.5)
CHLORIDE SERPL-SCNC: 105 MMOL/L (ref 95–110)
CHOLEST SERPL-MCNC: 220 MG/DL (ref 120–199)
CHOLEST/HDLC SERPL: 3.4 {RATIO} (ref 2–5)
CLARITY UR: CLEAR
CO2 SERPL-SCNC: 26 MMOL/L (ref 23–29)
COLOR UR: YELLOW
CREAT SERPL-MCNC: 0.8 MG/DL (ref 0.5–1.4)
DIFFERENTIAL METHOD: NORMAL
EOSINOPHIL # BLD AUTO: 0.1 K/UL (ref 0–0.5)
EOSINOPHIL NFR BLD: 1.1 % (ref 0–8)
ERYTHROCYTE [DISTWIDTH] IN BLOOD BY AUTOMATED COUNT: 13.5 % (ref 11.5–14.5)
EST. GFR  (NO RACE VARIABLE): >60 ML/MIN/1.73 M^2
ESTIMATED AVG GLUCOSE: 94 MG/DL (ref 68–131)
FERRITIN SERPL-MCNC: 44 NG/ML (ref 20–300)
GLUCOSE SERPL-MCNC: 94 MG/DL (ref 70–110)
GLUCOSE UR QL STRIP: NEGATIVE
HBA1C MFR BLD: 4.9 % (ref 4–5.6)
HCT VFR BLD AUTO: 41.9 % (ref 37–48.5)
HDLC SERPL-MCNC: 65 MG/DL (ref 40–75)
HDLC SERPL: 29.5 % (ref 20–50)
HGB BLD-MCNC: 13.7 G/DL (ref 12–16)
HGB UR QL STRIP: ABNORMAL
IMM GRANULOCYTES # BLD AUTO: 0.01 K/UL (ref 0–0.04)
IMM GRANULOCYTES NFR BLD AUTO: 0.1 % (ref 0–0.5)
INSULIN COLLECTION INTERVAL: NORMAL
INSULIN SERPL-ACNC: 9.4 UU/ML
IRON SERPL-MCNC: 79 UG/DL (ref 30–160)
KETONES UR QL STRIP: NEGATIVE
LDLC SERPL CALC-MCNC: 141.2 MG/DL (ref 63–159)
LEUKOCYTE ESTERASE UR QL STRIP: NEGATIVE
LYMPHOCYTES # BLD AUTO: 2.5 K/UL (ref 1–4.8)
LYMPHOCYTES NFR BLD: 29.1 % (ref 18–48)
MCH RBC QN AUTO: 28.2 PG (ref 27–31)
MCHC RBC AUTO-ENTMCNC: 32.7 G/DL (ref 32–36)
MCV RBC AUTO: 86 FL (ref 82–98)
MICROSCOPIC COMMENT: ABNORMAL
MONOCYTES # BLD AUTO: 0.5 K/UL (ref 0.3–1)
MONOCYTES NFR BLD: 5.6 % (ref 4–15)
NEUTROPHILS # BLD AUTO: 5.4 K/UL (ref 1.8–7.7)
NEUTROPHILS NFR BLD: 63.3 % (ref 38–73)
NITRITE UR QL STRIP: NEGATIVE
NONHDLC SERPL-MCNC: 155 MG/DL
NRBC BLD-RTO: 0 /100 WBC
PH UR STRIP: 6 [PH] (ref 5–8)
PLATELET # BLD AUTO: 394 K/UL (ref 150–450)
PMV BLD AUTO: 9.6 FL (ref 9.2–12.9)
POTASSIUM SERPL-SCNC: 4.3 MMOL/L (ref 3.5–5.1)
PROT SERPL-MCNC: 7.5 G/DL (ref 6–8.4)
PROT UR QL STRIP: ABNORMAL
RBC # BLD AUTO: 4.86 M/UL (ref 4–5.4)
RBC #/AREA URNS HPF: 2 /HPF (ref 0–4)
SATURATED IRON: 21 % (ref 20–50)
SODIUM SERPL-SCNC: 141 MMOL/L (ref 136–145)
SP GR UR STRIP: >=1.03 (ref 1–1.03)
SQUAMOUS #/AREA URNS HPF: 4 /HPF
TOTAL IRON BINDING CAPACITY: 377 UG/DL (ref 250–450)
TRANSFERRIN SERPL-MCNC: 255 MG/DL (ref 200–375)
TRIGL SERPL-MCNC: 69 MG/DL (ref 30–150)
TSH SERPL DL<=0.005 MIU/L-ACNC: 2.13 UIU/ML (ref 0.4–4)
URN SPEC COLLECT METH UR: ABNORMAL
WBC # BLD AUTO: 8.51 K/UL (ref 3.9–12.7)
WBC #/AREA URNS HPF: 1 /HPF (ref 0–5)

## 2023-11-29 PROCEDURE — 90471 IMMUNIZATION ADMIN: CPT | Mod: S$GLB,,, | Performed by: FAMILY MEDICINE

## 2023-11-29 PROCEDURE — 3075F SYST BP GE 130 - 139MM HG: CPT | Mod: CPTII,S$GLB,, | Performed by: FAMILY MEDICINE

## 2023-11-29 PROCEDURE — 84466 ASSAY OF TRANSFERRIN: CPT | Performed by: FAMILY MEDICINE

## 2023-11-29 PROCEDURE — 3008F PR BODY MASS INDEX (BMI) DOCUMENTED: ICD-10-PCS | Mod: CPTII,S$GLB,, | Performed by: FAMILY MEDICINE

## 2023-11-29 PROCEDURE — 99999 PR PBB SHADOW E&M-EST. PATIENT-LVL III: ICD-10-PCS | Mod: PBBFAC,,, | Performed by: FAMILY MEDICINE

## 2023-11-29 PROCEDURE — 3079F PR MOST RECENT DIASTOLIC BLOOD PRESSURE 80-89 MM HG: ICD-10-PCS | Mod: CPTII,S$GLB,, | Performed by: FAMILY MEDICINE

## 2023-11-29 PROCEDURE — 99396 PR PREVENTIVE VISIT,EST,40-64: ICD-10-PCS | Mod: 25,S$GLB,, | Performed by: FAMILY MEDICINE

## 2023-11-29 PROCEDURE — 80053 COMPREHEN METABOLIC PANEL: CPT | Performed by: FAMILY MEDICINE

## 2023-11-29 PROCEDURE — 83540 ASSAY OF IRON: CPT | Performed by: FAMILY MEDICINE

## 2023-11-29 PROCEDURE — 82728 ASSAY OF FERRITIN: CPT | Performed by: FAMILY MEDICINE

## 2023-11-29 PROCEDURE — 85025 COMPLETE CBC W/AUTO DIFF WBC: CPT | Performed by: FAMILY MEDICINE

## 2023-11-29 PROCEDURE — 3008F BODY MASS INDEX DOCD: CPT | Mod: CPTII,S$GLB,, | Performed by: FAMILY MEDICINE

## 2023-11-29 PROCEDURE — 1159F PR MEDICATION LIST DOCUMENTED IN MEDICAL RECORD: ICD-10-PCS | Mod: CPTII,S$GLB,, | Performed by: FAMILY MEDICINE

## 2023-11-29 PROCEDURE — 80061 LIPID PANEL: CPT | Performed by: FAMILY MEDICINE

## 2023-11-29 PROCEDURE — 36415 COLL VENOUS BLD VENIPUNCTURE: CPT | Mod: PO | Performed by: FAMILY MEDICINE

## 2023-11-29 PROCEDURE — 90686 IIV4 VACC NO PRSV 0.5 ML IM: CPT | Mod: S$GLB,,, | Performed by: FAMILY MEDICINE

## 2023-11-29 PROCEDURE — 90686 FLU VACCINE (QUAD) GREATER THAN OR EQUAL TO 3YO PRESERVATIVE FREE IM: ICD-10-PCS | Mod: S$GLB,,, | Performed by: FAMILY MEDICINE

## 2023-11-29 PROCEDURE — 1159F MED LIST DOCD IN RCRD: CPT | Mod: CPTII,S$GLB,, | Performed by: FAMILY MEDICINE

## 2023-11-29 PROCEDURE — 3075F PR MOST RECENT SYSTOLIC BLOOD PRESS GE 130-139MM HG: ICD-10-PCS | Mod: CPTII,S$GLB,, | Performed by: FAMILY MEDICINE

## 2023-11-29 PROCEDURE — 87086 URINE CULTURE/COLONY COUNT: CPT | Performed by: FAMILY MEDICINE

## 2023-11-29 PROCEDURE — 83525 ASSAY OF INSULIN: CPT | Performed by: FAMILY MEDICINE

## 2023-11-29 PROCEDURE — 84443 ASSAY THYROID STIM HORMONE: CPT | Performed by: FAMILY MEDICINE

## 2023-11-29 PROCEDURE — 81000 URINALYSIS NONAUTO W/SCOPE: CPT | Mod: PO | Performed by: FAMILY MEDICINE

## 2023-11-29 PROCEDURE — 99999 PR PBB SHADOW E&M-EST. PATIENT-LVL III: CPT | Mod: PBBFAC,,, | Performed by: FAMILY MEDICINE

## 2023-11-29 PROCEDURE — 90471 FLU VACCINE (QUAD) GREATER THAN OR EQUAL TO 3YO PRESERVATIVE FREE IM: ICD-10-PCS | Mod: S$GLB,,, | Performed by: FAMILY MEDICINE

## 2023-11-29 PROCEDURE — 83036 HEMOGLOBIN GLYCOSYLATED A1C: CPT | Performed by: FAMILY MEDICINE

## 2023-11-29 PROCEDURE — 82306 VITAMIN D 25 HYDROXY: CPT | Performed by: FAMILY MEDICINE

## 2023-11-29 PROCEDURE — 3079F DIAST BP 80-89 MM HG: CPT | Mod: CPTII,S$GLB,, | Performed by: FAMILY MEDICINE

## 2023-11-29 PROCEDURE — 99396 PREV VISIT EST AGE 40-64: CPT | Mod: 25,S$GLB,, | Performed by: FAMILY MEDICINE

## 2023-11-29 RX ORDER — NITROFURANTOIN 25; 75 MG/1; MG/1
100 CAPSULE ORAL 2 TIMES DAILY
Qty: 14 CAPSULE | Refills: 0 | Status: SHIPPED | OUTPATIENT
Start: 2023-11-29 | End: 2023-12-06

## 2023-11-29 RX ORDER — AMLODIPINE BESYLATE 2.5 MG/1
2.5 TABLET ORAL DAILY
Qty: 90 TABLET | Refills: 3 | Status: SHIPPED | OUTPATIENT
Start: 2023-11-29 | End: 2024-11-28

## 2023-11-29 NOTE — PROGRESS NOTES
Assessment:       1. Annual visit for general adult medical examination with abnormal findings    2. Iron deficiency    3. Screening for diabetes mellitus    4. BMI 32.0-32.9,adult    5. Dark urine    6. Essential hypertension        Plan:       Annual visit for general adult medical examination with abnormal findings  -     CBC Auto Differential; Future; Expected date: 11/29/2023  -     Comprehensive Metabolic Panel; Future; Expected date: 11/29/2023  -     Lipid Panel; Future; Expected date: 11/29/2023  -     TSH; Future; Expected date: 11/29/2023  -     Hemoglobin A1C; Future; Expected date: 11/29/2023  -     Iron and TIBC; Future; Expected date: 11/29/2023  -     Ferritin; Future; Expected date: 11/29/2023  -     Vitamin D; Future; Expected date: 11/29/2023  -     Urinalysis; Future; Expected date: 11/29/2023  -     Urine culture; Future; Expected date: 11/29/2023  -     Insulin, Random; Future; Expected date: 11/29/2023    Iron deficiency:  Stable  -     Iron and TIBC; Future; Expected date: 11/29/2023  -     Ferritin; Future; Expected date: 11/29/2023    Screening for diabetes mellitus  -     Hemoglobin A1C; Future; Expected date: 11/29/2023    BMI 32.0-32.9,adult:  Worsening  -     Vitamin D; Future; Expected date: 11/29/2023  -     Insulin, Random; Future; Expected date: 11/29/2023    Dark urine:  New problem workup needed  -     Urinalysis; Future; Expected date: 11/29/2023  -     Urine culture; Future; Expected date: 11/29/2023    Essential hypertension:  Uncontrolled  -     amLODIPine (NORVASC) 2.5 MG tablet; Take 1 tablet (2.5 mg total) by mouth once daily.  Dispense: 90 tablet; Refill: 3       Recommend weight loss, increase physical activity, dash diet, Mediterranean diet, will start patient back on amlodipine 2.5 mg, will discontinue hydrochlorothiazide due to side effects.  Will send a message when we have the results of the test.  The patient's BMI has been recorded in the chart. The patient has  been provided educational materials regarding the benefits of attaining and maintaining a normal weight. We will continue to address and follow this issue during follow up visits.   Patient agreed with assessment and plan. Patient verbalized understanding.     Subjective:       Patient ID: Lacey Jenkins Mondor is a 38 y.o. female.    Chief Complaint: Annual Exam     HPI    The patient is coming here today for an annual checkup.  The patient gain some weight since her last office visit, she stop taking hydrochlorothiazide for blood pressure secondary to notice that her urine was very dark, she has been drinking a lot a water but the urine continues to be dark.  The patient still trying to adjust as she moved to leave with her mother after her does passed away.  She has not been able to exercise and be on her routine.  She has iron deficiency, the last hemoglobin was improved from previous, she denies any symptoms of chest pain and shortness for breath.    Past medical history, past social history was reviewed and discussed with the patient.    Review of Systems   Constitutional:  Positive for unexpected weight change. Negative for activity change and appetite change.   HENT:  Negative for congestion and ear discharge.    Eyes:  Negative for discharge and itching.   Respiratory:  Negative for choking and chest tightness.    Cardiovascular:  Negative for chest pain and palpitations.   Gastrointestinal:  Negative for abdominal distention, abdominal pain and constipation.   Endocrine: Negative for cold intolerance and heat intolerance.   Genitourinary:  Negative for dysuria and flank pain.   Musculoskeletal:  Negative for arthralgias and back pain.   Skin:  Negative for pallor and rash.   Allergic/Immunologic: Negative for environmental allergies and food allergies.   Neurological:  Negative for dizziness, facial asymmetry and headaches.   Hematological:  Negative for adenopathy. Does not bruise/bleed easily.    Psychiatric/Behavioral:  Negative for agitation and confusion.        Objective:      Physical Exam  Vitals and nursing note reviewed.   Constitutional:       General: She is not in acute distress.     Appearance: Normal appearance. She is well-developed. She is obese. She is not diaphoretic.   HENT:      Head: Normocephalic and atraumatic.      Right Ear: External ear normal.      Left Ear: External ear normal.      Nose: Nose normal.      Mouth/Throat:      Pharynx: No oropharyngeal exudate.   Eyes:      General: No scleral icterus.        Right eye: No discharge.         Left eye: No discharge.      Conjunctiva/sclera: Conjunctivae normal.      Pupils: Pupils are equal, round, and reactive to light.   Cardiovascular:      Rate and Rhythm: Normal rate and regular rhythm.      Heart sounds: Normal heart sounds.   Pulmonary:      Effort: Pulmonary effort is normal. No respiratory distress.      Breath sounds: Normal breath sounds. No wheezing.   Abdominal:      General: Abdomen is flat. Bowel sounds are normal. There is no distension.      Tenderness: There is no abdominal tenderness.   Musculoskeletal:         General: No tenderness or deformity.      Cervical back: Neck supple.   Skin:     Coloration: Skin is not pale.      Findings: No erythema.   Neurological:      Mental Status: She is alert.      Cranial Nerves: No cranial nerve deficit.      Coordination: Coordination normal.   Psychiatric:         Behavior: Behavior normal.         Thought Content: Thought content normal.         Judgment: Judgment normal.

## 2023-11-30 DIAGNOSIS — E55.9 VITAMIN D DEFICIENCY: Primary | ICD-10-CM

## 2023-11-30 RX ORDER — ASPIRIN 325 MG
50000 TABLET, DELAYED RELEASE (ENTERIC COATED) ORAL
Qty: 12 CAPSULE | Refills: 0 | Status: SHIPPED | OUTPATIENT
Start: 2023-11-30 | End: 2024-02-29 | Stop reason: DRUGHIGH

## 2023-12-01 LAB
BACTERIA UR CULT: NORMAL
BACTERIA UR CULT: NORMAL

## 2023-12-09 ENCOUNTER — PATIENT MESSAGE (OUTPATIENT)
Dept: FAMILY MEDICINE | Facility: CLINIC | Age: 38
End: 2023-12-09
Payer: COMMERCIAL

## 2023-12-12 ENCOUNTER — E-VISIT (OUTPATIENT)
Dept: FAMILY MEDICINE | Facility: CLINIC | Age: 38
End: 2023-12-12
Payer: COMMERCIAL

## 2023-12-12 DIAGNOSIS — R10.9 FLANK PAIN: ICD-10-CM

## 2023-12-12 DIAGNOSIS — R30.0 DYSURIA: Primary | ICD-10-CM

## 2023-12-12 DIAGNOSIS — R05.1 ACUTE COUGH: ICD-10-CM

## 2023-12-12 DIAGNOSIS — R93.89 ABNORMAL CT SCAN: ICD-10-CM

## 2023-12-12 PROCEDURE — 99422 PR E&M, ONLINE DIGIT, EST, < 7 DAYS,  11-20 MINS: ICD-10-PCS | Mod: ,,, | Performed by: FAMILY MEDICINE

## 2023-12-12 PROCEDURE — 99452 NTRPROF PH1/NTRNET/EHR RFRL: CPT | Mod: 95,,, | Performed by: FAMILY MEDICINE

## 2023-12-12 PROCEDURE — 99452 E-CONSULT TO PULMONARY: ICD-10-PCS | Mod: 95,,, | Performed by: FAMILY MEDICINE

## 2023-12-12 PROCEDURE — 99422 OL DIG E/M SVC 11-20 MIN: CPT | Mod: ,,, | Performed by: FAMILY MEDICINE

## 2023-12-12 NOTE — PROGRESS NOTES
Patient ID: Lacey Jenkins Mondor is a 38 y.o. female.    Chief Complaint: Urinary Tract Infection (Entered automatically based on patient selection in Patient Portal.)    The patient initiated a request through [employer] on 12/12/2023 for evaluation and management with a chief complaint of Urinary Tract Infection (Entered automatically based on patient selection in Patient Portal.)     I evaluated the questionnaire submission on 12/12/2023.    [facility] Evisit Uti Questionnaire    12/12/2023  7:53 AM CST - Filed by Patient   Do you agree to participate in an E-Visit? Yes   If you have any of the following problems, please present to your local ER or call 911:  I acknowledge   What is the main issue that you would like for your doctor to address today? Urinary issues   Are you able to take your vital signs? No   Are you currently pregnant, could you be pregnant, or are you breast feeding? None of the above   What symptoms do you currently have? Pain while passing urine;  Change in urine appearance or smell   When did your symptoms first appear? 11/22/2023   List what you have done or taken to help your symptoms. Date random, dont know. One week macrobid, increased water intake   Please indicate whether you have had the following symptoms during the past 24 hours     Urgent urination (a sudden and uncontrollable urge to urinate) Severe   Frequent urination of small amounts of urine (going to the toilet very often) None   Burning pain when urinating Moderate   Incomplete bladder emptying (still feel like you need to urinate again after urination) Mild   Pain not associated with urination in the lower abdomen below the belly button) Mild   What does your urine look like? Cloudy   Blood seen in the urine None   Flank pain (pain in one or both sides of the lower back) Mild   Abnormal Vaginal Discharge (abnormal amount, color and/or odor) Mild   Discharge from the urethra (urinary opening) without urination None   High body  temperature/fever? None-<99.5   Please rate how much discomfort you have experience because of the symptoms in the past 24 hours: Moderate   Please indicate how the symptoms have interfered with your every day activities/work in the past 24 hours: Mild   Please indicate how these symptoms have interfered with your social activities (visiting people, meeting with friends, etc.) in the past 24 hours? Mild   Are you a diabetic? No   Please indicate whether you have the following at the time of completion of this questionnaire: None of the above   Provide any information you feel is important to your history not asked above Discomfort vaginal, very dark urine since before stopping hctz therapy, and urine seems more visible compared to toilet water around it   Please attach any relevant images or files (if you have performed a home test for UTI, please submit a photo of results)          Recent Labs Obtained:  No visits with results within 7 Day(s) from this visit.   Latest known visit with results is:   Lab Visit on 11/29/2023   Component Date Value Ref Range Status    WBC 11/29/2023 8.51  3.90 - 12.70 K/uL Final    RBC 11/29/2023 4.86  4.00 - 5.40 M/uL Final    Hemoglobin 11/29/2023 13.7  12.0 - 16.0 g/dL Final    Hematocrit 11/29/2023 41.9  37.0 - 48.5 % Final    MCV 11/29/2023 86  82 - 98 fL Final    MCH 11/29/2023 28.2  27.0 - 31.0 pg Final    MCHC 11/29/2023 32.7  32.0 - 36.0 g/dL Final    RDW 11/29/2023 13.5  11.5 - 14.5 % Final    Platelets 11/29/2023 394  150 - 450 K/uL Final    MPV 11/29/2023 9.6  9.2 - 12.9 fL Final    Immature Granulocytes 11/29/2023 0.1  0.0 - 0.5 % Final    Gran # (ANC) 11/29/2023 5.4  1.8 - 7.7 K/uL Final    Immature Grans (Abs) 11/29/2023 0.01  0.00 - 0.04 K/uL Final    Comment: Mild elevation in immature granulocytes is non specific and   can be seen in a variety of conditions including stress response,   acute inflammation, trauma and pregnancy. Correlation with other   laboratory and  clinical findings is essential.      Lymph # 11/29/2023 2.5  1.0 - 4.8 K/uL Final    Mono # 11/29/2023 0.5  0.3 - 1.0 K/uL Final    Eos # 11/29/2023 0.1  0.0 - 0.5 K/uL Final    Baso # 11/29/2023 0.07  0.00 - 0.20 K/uL Final    nRBC 11/29/2023 0  0 /100 WBC Final    Gran % 11/29/2023 63.3  38.0 - 73.0 % Final    Lymph % 11/29/2023 29.1  18.0 - 48.0 % Final    Mono % 11/29/2023 5.6  4.0 - 15.0 % Final    Eosinophil % 11/29/2023 1.1  0.0 - 8.0 % Final    Basophil % 11/29/2023 0.8  0.0 - 1.9 % Final    Differential Method 11/29/2023 Automated   Final    Sodium 11/29/2023 141  136 - 145 mmol/L Final    Potassium 11/29/2023 4.3  3.5 - 5.1 mmol/L Final    Chloride 11/29/2023 105  95 - 110 mmol/L Final    CO2 11/29/2023 26  23 - 29 mmol/L Final    Glucose 11/29/2023 94  70 - 110 mg/dL Final    BUN 11/29/2023 18  6 - 20 mg/dL Final    Creatinine 11/29/2023 0.8  0.5 - 1.4 mg/dL Final    Calcium 11/29/2023 9.5  8.7 - 10.5 mg/dL Final    Total Protein 11/29/2023 7.5  6.0 - 8.4 g/dL Final    Albumin 11/29/2023 4.3  3.5 - 5.2 g/dL Final    Total Bilirubin 11/29/2023 0.5  0.1 - 1.0 mg/dL Final    Comment: For infants and newborns, interpretation of results should be based  on gestational age, weight and in agreement with clinical  observations.    Premature Infant recommended reference ranges:  Up to 24 hours.............<8.0 mg/dL  Up to 48 hours............<12.0 mg/dL  3-5 days..................<15.0 mg/dL  6-29 days.................<15.0 mg/dL      Alkaline Phosphatase 11/29/2023 74  55 - 135 U/L Final    AST 11/29/2023 13  10 - 40 U/L Final    ALT 11/29/2023 9 (L)  10 - 44 U/L Final    eGFR 11/29/2023 >60.0  >60 mL/min/1.73 m^2 Final    Anion Gap 11/29/2023 10  8 - 16 mmol/L Final    Cholesterol 11/29/2023 220 (H)  120 - 199 mg/dL Final    Comment: The National Cholesterol Education Program (NCEP) has set the  following guidelines (reference ranges) for Cholesterol:  Optimal.....................<200 mg/dL  Borderline  High.............200-239 mg/dL  High........................> or = 240 mg/dL      Triglycerides 11/29/2023 69  30 - 150 mg/dL Final    Comment: The National Cholesterol Education Program (NCEP) has set the  following guidelines (reference values) for triglycerides:  Normal......................<150 mg/dL  Borderline High.............150-199 mg/dL  High........................200-499 mg/dL      HDL 11/29/2023 65  40 - 75 mg/dL Final    Comment: The National Cholesterol Education Program (NCEP) has set the  following guidelines (reference values) for HDL Cholesterol:  Low...............<40 mg/dL  Optimal...........>60 mg/dL      LDL Cholesterol 11/29/2023 141.2  63.0 - 159.0 mg/dL Final    Comment: The National Cholesterol Education Program (NCEP) has set the  following guidelines (reference values) for LDL Cholesterol:  Optimal.......................<130 mg/dL  Borderline High...............130-159 mg/dL  High..........................160-189 mg/dL  Very High.....................>190 mg/dL      HDL/Cholesterol Ratio 11/29/2023 29.5  20.0 - 50.0 % Final    Total Cholesterol/HDL Ratio 11/29/2023 3.4  2.0 - 5.0 Final    Non-HDL Cholesterol 11/29/2023 155  mg/dL Final    Comment: Risk category and Non-HDL cholesterol goals:  Coronary heart disease (CHD)or equivalent (10-year risk of CHD >20%):  Non-HDL cholesterol goal     <130 mg/dL  Two or more CHD risk factors and 10-year risk of CHD <= 20%:  Non-HDL cholesterol goal     <160 mg/dL  0 to 1 CHD risk factor:  Non-HDL cholesterol goal     <190 mg/dL      TSH 11/29/2023 2.131  0.400 - 4.000 uIU/mL Final    Hemoglobin A1C 11/29/2023 4.9  4.0 - 5.6 % Final    Comment: ADA Screening Guidelines:  5.7-6.4%  Consistent with prediabetes  >or=6.5%  Consistent with diabetes    High levels of fetal hemoglobin interfere with the HbA1C  assay. Heterozygous hemoglobin variants (HbS, HgC, etc)do  not significantly interfere with this assay.   However, presence of multiple variants may  affect accuracy.      Estimated Avg Glucose 2023 94  68 - 131 mg/dL Final    Iron 2023 79  30 - 160 ug/dL Final    Transferrin 2023 255  200 - 375 mg/dL Final    TIBC 2023 377  250 - 450 ug/dL Final    Saturated Iron 2023 21  20 - 50 % Final    Ferritin 2023 44  20.0 - 300.0 ng/mL Final    Vit D, 25-Hydroxy 2023 20 (L)  30 - 96 ng/mL Final    Comment: Vitamin D deficiency.........<10 ng/mL                              Vitamin D insufficiency......10-29 ng/mL       Vitamin D sufficiency........> or equal to 30 ng/mL  Vitamin D toxicity............>100 ng/mL      Insulin 2023 9.4  <25.0 uU/mL Final    Insulin Collection Interval 2023 Random   Final       Encounter Diagnosis   Name Primary?    Dysuria Yes        Orders Placed This Encounter   Procedures    Urine culture     Standing Status:   Future     Standing Expiration Date:   2024    Urinalysis     Standing Status:   Future     Standing Expiration Date:   2025     Order Specific Question:   Collection Type     Answer:   Urine, Clean Catch            No follow-ups on file.      E-Visit Time Trackin minutes        Dysuria:  Worsening  -     Urinalysis; Future; Expected date: 2023  -     Urine culture; Future; Expected date: 2023         Urinalysis and urine culture were ordered, will send a message when we have the results of the test, recommend to drink plenty water.  Addendum:  The patient still having dark urine now flank pain, urinalysis showed presence of bacteria, oxalate crystals, hyaline casts.  Order for a CT scan kidney stone protocol was placed.  Antibiotics Augmentin twice daily for 7 days were given to the patient and continue drinking plenty water.

## 2023-12-13 ENCOUNTER — LAB VISIT (OUTPATIENT)
Dept: LAB | Facility: HOSPITAL | Age: 38
End: 2023-12-13
Attending: FAMILY MEDICINE
Payer: COMMERCIAL

## 2023-12-13 DIAGNOSIS — N39.0 BACTERIAL UTI: Primary | ICD-10-CM

## 2023-12-13 DIAGNOSIS — A49.9 BACTERIAL UTI: Primary | ICD-10-CM

## 2023-12-13 DIAGNOSIS — R30.0 DYSURIA: ICD-10-CM

## 2023-12-13 LAB
BACTERIA #/AREA URNS HPF: ABNORMAL /HPF
BILIRUB UR QL STRIP: NEGATIVE
CAOX CRY URNS QL MICRO: ABNORMAL
CLARITY UR: CLEAR
COLOR UR: YELLOW
GLUCOSE UR QL STRIP: NEGATIVE
HGB UR QL STRIP: ABNORMAL
HYALINE CASTS #/AREA URNS LPF: 4 /LPF
KETONES UR QL STRIP: ABNORMAL
LEUKOCYTE ESTERASE UR QL STRIP: ABNORMAL
MICROSCOPIC COMMENT: ABNORMAL
NITRITE UR QL STRIP: NEGATIVE
PH UR STRIP: 6 [PH] (ref 5–8)
PROT UR QL STRIP: ABNORMAL
RBC #/AREA URNS HPF: 4 /HPF (ref 0–4)
SP GR UR STRIP: >=1.03 (ref 1–1.03)
SQUAMOUS #/AREA URNS HPF: 10 /HPF
URN SPEC COLLECT METH UR: ABNORMAL
WBC #/AREA URNS HPF: 10 /HPF (ref 0–5)
WBC CLUMPS URNS QL MICRO: ABNORMAL

## 2023-12-13 PROCEDURE — 81000 URINALYSIS NONAUTO W/SCOPE: CPT | Mod: PO | Performed by: FAMILY MEDICINE

## 2023-12-13 PROCEDURE — 87086 URINE CULTURE/COLONY COUNT: CPT | Performed by: FAMILY MEDICINE

## 2023-12-13 RX ORDER — AMOXICILLIN AND CLAVULANATE POTASSIUM 875; 125 MG/1; MG/1
1 TABLET, FILM COATED ORAL 2 TIMES DAILY
Qty: 14 TABLET | Refills: 0 | Status: SHIPPED | OUTPATIENT
Start: 2023-12-13 | End: 2023-12-20

## 2023-12-14 ENCOUNTER — HOSPITAL ENCOUNTER (OUTPATIENT)
Dept: RADIOLOGY | Facility: HOSPITAL | Age: 38
Discharge: HOME | End: 2023-12-14
Attending: FAMILY MEDICINE
Payer: COMMERCIAL

## 2023-12-14 DIAGNOSIS — R10.9 FLANK PAIN: ICD-10-CM

## 2023-12-14 LAB
BACTERIA UR CULT: NORMAL
BACTERIA UR CULT: NORMAL

## 2023-12-14 PROCEDURE — 74176 CT ABD & PELVIS W/O CONTRAST: CPT | Mod: TC,PO

## 2023-12-14 PROCEDURE — 74176 CT ABD & PELVIS W/O CONTRAST: CPT | Mod: 26,,, | Performed by: RADIOLOGY

## 2023-12-14 PROCEDURE — 74176 CT RENAL STONE STUDY ABD PELVIS WO: ICD-10-PCS | Mod: 26,,, | Performed by: RADIOLOGY

## 2023-12-16 ENCOUNTER — PATIENT MESSAGE (OUTPATIENT)
Dept: ADMINISTRATIVE | Facility: OTHER | Age: 38
End: 2023-12-16
Payer: COMMERCIAL

## 2023-12-20 ENCOUNTER — HOSPITAL ENCOUNTER (OUTPATIENT)
Dept: RADIOLOGY | Facility: HOSPITAL | Age: 38
Discharge: HOME | End: 2023-12-20
Attending: FAMILY MEDICINE
Payer: COMMERCIAL

## 2023-12-20 DIAGNOSIS — Z13.6 ENCOUNTER FOR SCREENING FOR CARDIOVASCULAR DISORDERS: ICD-10-CM

## 2023-12-20 DIAGNOSIS — I10 PRIMARY HYPERTENSION: ICD-10-CM

## 2023-12-20 DIAGNOSIS — Z82.49 FAMILY HISTORY OF HEART DISEASE: ICD-10-CM

## 2023-12-20 DIAGNOSIS — E78.49 OTHER HYPERLIPIDEMIA: ICD-10-CM

## 2023-12-20 PROCEDURE — 75571 CT CALCIUM SCORING CARDIAC: ICD-10-PCS | Mod: 26,,, | Performed by: RADIOLOGY

## 2023-12-20 PROCEDURE — 75571 CT HRT W/O DYE W/CA TEST: CPT | Mod: TC,PO

## 2023-12-20 PROCEDURE — 75571 CT HRT W/O DYE W/CA TEST: CPT | Mod: 26,,, | Performed by: RADIOLOGY

## 2023-12-21 ENCOUNTER — E-CONSULT (OUTPATIENT)
Dept: PULMONOLOGY | Facility: CLINIC | Age: 38
End: 2023-12-21
Payer: COMMERCIAL

## 2023-12-21 ENCOUNTER — PATIENT MESSAGE (OUTPATIENT)
Dept: FAMILY MEDICINE | Facility: CLINIC | Age: 38
End: 2023-12-21
Payer: COMMERCIAL

## 2023-12-21 DIAGNOSIS — R93.89 ABNORMAL CT OF THE CHEST: Primary | ICD-10-CM

## 2023-12-21 PROCEDURE — 99451 PR INTERPROF, PHONE/INTERNET/EHR, CONSULT, >= 5MINS: ICD-10-PCS | Mod: S$GLB,,, | Performed by: INTERNAL MEDICINE

## 2023-12-21 PROCEDURE — 99451 NTRPROF PH1/NTRNET/EHR 5/>: CPT | Mod: S$GLB,,, | Performed by: INTERNAL MEDICINE

## 2023-12-21 NOTE — CONSULTS
[facility]  Response for E-Consult     Patient Name: Lacey Jenkins Mondor  MRN: [id number]  Primary Care Provider: Sandra Andersen MD   Requesting Provider: Sandra Andersen MD  Consults    Would repeat CT chest in 2-3 months. If abnormalities persist can place referral to be seen in person    Total time of Consultation: 10 minute    I did not speak to the requesting provider verbally about this.     *This eConsult is based on the clinical data available to me and is furnished without benefit of a physical examination. The eConsult will need to be interpreted in light of any clinical issues or changes in patient status not available to me at the time of filing this eConsults. Significant changes in patient condition or level of acuity should result in immediate formal consultation and reevaluation. Please alert me if you have further questions.    Thank you for this eConsult referral.     Jeyson Ho MD  [facility]

## 2024-02-29 ENCOUNTER — PATIENT MESSAGE (OUTPATIENT)
Dept: FAMILY MEDICINE | Facility: CLINIC | Age: 39
End: 2024-02-29
Payer: COMMERCIAL

## 2024-02-29 ENCOUNTER — LAB VISIT (OUTPATIENT)
Dept: LAB | Facility: HOSPITAL | Age: 39
End: 2024-02-29
Attending: FAMILY MEDICINE
Payer: COMMERCIAL

## 2024-02-29 ENCOUNTER — OFFICE VISIT (OUTPATIENT)
Dept: FAMILY MEDICINE | Facility: CLINIC | Age: 39
End: 2024-02-29
Payer: COMMERCIAL

## 2024-02-29 VITALS
OXYGEN SATURATION: 97 % | TEMPERATURE: 98 F | HEIGHT: 63 IN | BODY MASS INDEX: 32.11 KG/M2 | WEIGHT: 181.19 LBS | DIASTOLIC BLOOD PRESSURE: 70 MMHG | HEART RATE: 85 BPM | SYSTOLIC BLOOD PRESSURE: 130 MMHG | RESPIRATION RATE: 18 BRPM

## 2024-02-29 DIAGNOSIS — R31.29 OTHER MICROSCOPIC HEMATURIA: ICD-10-CM

## 2024-02-29 DIAGNOSIS — E61.1 IRON DEFICIENCY: ICD-10-CM

## 2024-02-29 DIAGNOSIS — I10 ESSENTIAL HYPERTENSION: Primary | ICD-10-CM

## 2024-02-29 DIAGNOSIS — Z00.01 ANNUAL VISIT FOR GENERAL ADULT MEDICAL EXAMINATION WITH ABNORMAL FINDINGS: Primary | ICD-10-CM

## 2024-02-29 DIAGNOSIS — E55.9 VITAMIN D DEFICIENCY: ICD-10-CM

## 2024-02-29 DIAGNOSIS — R31.29 OTHER MICROSCOPIC HEMATURIA: Primary | ICD-10-CM

## 2024-02-29 DIAGNOSIS — Z00.01 ANNUAL VISIT FOR GENERAL ADULT MEDICAL EXAMINATION WITH ABNORMAL FINDINGS: ICD-10-CM

## 2024-02-29 DIAGNOSIS — F33.1 MODERATE EPISODE OF RECURRENT MAJOR DEPRESSIVE DISORDER: ICD-10-CM

## 2024-02-29 LAB
BACTERIA #/AREA URNS HPF: ABNORMAL /HPF
BILIRUB UR QL STRIP: NEGATIVE
CAOX CRY URNS QL MICRO: ABNORMAL
CLARITY UR: CLEAR
COLOR UR: YELLOW
GLUCOSE UR QL STRIP: NEGATIVE
HGB UR QL STRIP: ABNORMAL
KETONES UR QL STRIP: ABNORMAL
LEUKOCYTE ESTERASE UR QL STRIP: NEGATIVE
MICROSCOPIC COMMENT: ABNORMAL
NITRITE UR QL STRIP: NEGATIVE
PH UR STRIP: 6 [PH] (ref 5–8)
PROT UR QL STRIP: NEGATIVE
RBC #/AREA URNS HPF: 5 /HPF (ref 0–4)
SP GR UR STRIP: >=1.03 (ref 1–1.03)
SQUAMOUS #/AREA URNS HPF: 5 /HPF
URN SPEC COLLECT METH UR: ABNORMAL
WBC #/AREA URNS HPF: 2 /HPF (ref 0–5)

## 2024-02-29 PROCEDURE — 81000 URINALYSIS NONAUTO W/SCOPE: CPT | Mod: PO | Performed by: FAMILY MEDICINE

## 2024-02-29 PROCEDURE — 3008F BODY MASS INDEX DOCD: CPT | Mod: CPTII,S$GLB,, | Performed by: FAMILY MEDICINE

## 2024-02-29 PROCEDURE — 3075F SYST BP GE 130 - 139MM HG: CPT | Mod: CPTII,S$GLB,, | Performed by: FAMILY MEDICINE

## 2024-02-29 PROCEDURE — 87086 URINE CULTURE/COLONY COUNT: CPT | Performed by: FAMILY MEDICINE

## 2024-02-29 PROCEDURE — 99999 PR PBB SHADOW E&M-EST. PATIENT-LVL IV: CPT | Mod: PBBFAC,,, | Performed by: FAMILY MEDICINE

## 2024-02-29 PROCEDURE — 3078F DIAST BP <80 MM HG: CPT | Mod: CPTII,S$GLB,, | Performed by: FAMILY MEDICINE

## 2024-02-29 PROCEDURE — 99214 OFFICE O/P EST MOD 30 MIN: CPT | Mod: S$GLB,,, | Performed by: FAMILY MEDICINE

## 2024-02-29 PROCEDURE — 1159F MED LIST DOCD IN RCRD: CPT | Mod: CPTII,S$GLB,, | Performed by: FAMILY MEDICINE

## 2024-03-01 LAB — BACTERIA UR CULT: NORMAL

## 2024-03-01 NOTE — PROGRESS NOTES
Assessment:       1. Essential hypertension    2. Moderate episode of recurrent major depressive disorder    3. Other microscopic hematuria        Assessment & Plan  1. Hypertension: well-controlled  Her blood pressure is doing good today. She will continue amlodipine.    2. Vitamin D deficiency: uncontrolled  Her vitamin D levels were low. Her ferritin level was 44. Her thyroid was good. Her LDL cholesterol was a little bit high compared with the previous one. She will start taking vitamin D3 2000 units daily.    3. Depression: stable  She will continue Wellbutrin 300 mg daily.    4. Microscopic hematuria: stable  Her urinalysis showed 2+ protein, 2+ blood, leukocytes, and bacteria. I will recheck her urinalysis today. She will get complete blood work before her annual checkup in 11/2024.    5. Weight management:  worsening  Her weight is 181 pounds today. She was advised to walk outdoors for 15 to 20 minutes first and then try to do all the exercises. She was advised to exercise at least 2 to 3 times a week.    Follow-up  She will follow up in 11/2024 for her annual checkup.         The patient's BMI has been recorded in the chart. The patient has been provided educational materials regarding the benefits of attaining and maintaining a normal weight. We will continue to address and follow this issue during follow up visits.   Patient agreed with assessment and plan. Patient verbalized understanding.     Subjective:       Patient ID: Lacey Jenkins Mondor is a 38 y.o. female.    Chief Complaint:  follow-up appointment    HPI  History of Present Illness  The patient presents for follow-up.    She has not been checking her blood pressure at home. She takes amlodipine daily.    She has been out of vitamin D for about a week.    She feels better mentally when she is on 2 Wellbutrin a day, but her blood pressure goes up every time she takes it. It has been rough since her father passed away. She is concerned that  SSRIs  would lead to weight gain. She has tried therapy, but she could not find a therapist that she liked. She is in grief counseling with [facility]. She had a terrible reaction to Prozac with emotional instability. She takes Wellbutrin 1 a day.    She takes D-mannose to help with her urinary symptoms.    She takes Ubrelvy as needed. She uses triamcinolone cream as needed.   She is not taking iron tablets due to constipation. She has been drinking Women's Boost One A Day.     Past medical history, past social history was reviewed and discussed with the patient.    Review of Systems   HENT:  Negative for congestion and dental problem.    Cardiovascular:  Negative for chest pain and leg swelling.   Gastrointestinal:  Negative for abdominal distention and abdominal pain.   Psychiatric/Behavioral:  Positive for dysphoric mood. The patient is nervous/anxious.        Objective:      Physical Exam    Physical Exam  Vital Signs  Weight is 181 pounds.    Lungs are clear to auscultation, heart is regular rate and rhythm, the patient is in no acute distress.  Results  Laboratory Studies  Labs were reviewed with the patient.    Testing  Urinalysis showed 2+ protein, 2+ blood, leukocytes, and bacteria.     This note was generated with the assistance of ambient listening technology. Verbal consent was obtained by the patient and accompanying visitor(s) for the recording of patient appointment to facilitate this note. I attest to having reviewed and edited the generated note for accuracy, though some syntax or spelling errors may persist. Please contact the author of this note for any clarification.

## 2024-03-25 ENCOUNTER — OFFICE VISIT (OUTPATIENT)
Dept: UROLOGY | Facility: CLINIC | Age: 39
End: 2024-03-25
Payer: COMMERCIAL

## 2024-03-25 VITALS — HEIGHT: 63 IN | WEIGHT: 184.06 LBS | BODY MASS INDEX: 32.61 KG/M2

## 2024-03-25 DIAGNOSIS — R31.29 MICROSCOPIC HEMATURIA: Primary | ICD-10-CM

## 2024-03-25 DIAGNOSIS — R31.29 OTHER MICROSCOPIC HEMATURIA: ICD-10-CM

## 2024-03-25 LAB
BILIRUBIN, UA POC OHS: NEGATIVE
BLOOD, UA POC OHS: ABNORMAL
CLARITY, UA POC OHS: CLEAR
COLOR, UA POC OHS: YELLOW
GLUCOSE, UA POC OHS: NEGATIVE
KETONES, UA POC OHS: ABNORMAL
LEUKOCYTES, UA POC OHS: NEGATIVE
NITRITE, UA POC OHS: NEGATIVE
PH, UA POC OHS: 6
PROTEIN, UA POC OHS: NEGATIVE
SPECIFIC GRAVITY, UA POC OHS: 1.02
UROBILINOGEN, UA POC OHS: 0.2

## 2024-03-25 PROCEDURE — 81003 URINALYSIS AUTO W/O SCOPE: CPT | Mod: QW,S$GLB,, | Performed by: UROLOGY

## 2024-03-25 PROCEDURE — 3008F BODY MASS INDEX DOCD: CPT | Mod: CPTII,S$GLB,, | Performed by: UROLOGY

## 2024-03-25 PROCEDURE — 99204 OFFICE O/P NEW MOD 45 MIN: CPT | Mod: S$GLB,,, | Performed by: UROLOGY

## 2024-03-25 PROCEDURE — 1159F MED LIST DOCD IN RCRD: CPT | Mod: CPTII,S$GLB,, | Performed by: UROLOGY

## 2024-03-25 PROCEDURE — 99999 PR PBB SHADOW E&M-EST. PATIENT-LVL III: CPT | Mod: PBBFAC,,, | Performed by: UROLOGY

## 2024-03-25 NOTE — PROGRESS NOTES
[facility]      Hematuria New Note    3/25/2024    Referred by:  Sandra Andersen MD    HPI: Lacey Jenkins Mondor is a very pleasant 38 y.o. female who is a new patient to our department referred for evaluation of microscopic hematuria. She denies symptoms of irritative voiding including dysuria. She denies symptoms of obstructive voiding including decreased stream, hesitancy, intermittency, post void dribbling, and sense of incomplete emptying.  Her history includes no notation of urolithiasis, hematuria, prior pelvic surgery, previous prolapse or incontinence procedures or neurological symptoms/diagnoses. She reports no urinary incontinence.     She reports no additional urothelial cancer risk factors including irritative LUTS, prior pelvic radiation therapy, prior cyclophosphamide/ifosfamide chemotherapy, family history of urothelial cancer, Caballero Sydrome, occupational exposures to benzenes or aromatic amines, or chronic indwelling foreign body such as a catheter.       Prior Evaluations:  No previous upper urinary tract evaluation  No previous lower urinary tract evaluation    A review of 10+ systems was conducted with pertinent positive and negative findings documented in HPI with all other systems reviewed and negative.    Past medical, family, surgical and social history reviewed as documented in chart with pertinent positive medical, family, surgical and social history detailed in HPI.    Exam Findings:    Const: no acute distress, conversant and alert  Eyes: anicteric, extraocular muscles intact  ENMT: normocephalic, Nl oral membranes  Cardio: no cyanosis, nl cap refill  Pulm: no tachypnea; no resp distress  Abd: soft, no tenderness  Musc: no laceration, no tenderness  Neuro: alert; oriented x 3  Skin: warm, dry; no petichiae  Psych: no anxiety; normal speech       Assessment/Plan:    Microscopic Hematuria:  (new, addt'l workup):  We discussed evaluation of her hematuria, including upper and lower  urinary tract studies. This patient is Low-Risk because the patient is a woman less than 50 years, has smoked for less than 10 years (or never smoked), has 3-10 RBC/HPF on microscopic urinalysis, and has no additional risk factors for urothelial cancer. Through shared decision-making, we have decided to repeat a urinalysis in 6 months. If the patient has persistent microscopic hematuria at that time, plan would be to proceed with intermediate or high-risk evaluation depending on the circumstances at that time.     Recommended repeat microscopic urinalysis with catheterized specimen in 6 months.

## 2024-04-11 ENCOUNTER — PATIENT MESSAGE (OUTPATIENT)
Dept: ADMINISTRATIVE | Facility: HOSPITAL | Age: 39
End: 2024-04-11
Payer: COMMERCIAL

## 2024-09-17 ENCOUNTER — PATIENT MESSAGE (OUTPATIENT)
Dept: FAMILY MEDICINE | Facility: CLINIC | Age: 39
End: 2024-09-17
Payer: COMMERCIAL

## 2024-09-17 DIAGNOSIS — B37.0 THRUSH: Primary | ICD-10-CM

## 2024-09-18 ENCOUNTER — E-VISIT (OUTPATIENT)
Dept: FAMILY MEDICINE | Facility: CLINIC | Age: 39
End: 2024-09-18
Payer: COMMERCIAL

## 2024-09-18 DIAGNOSIS — F41.9 ANXIETY: ICD-10-CM

## 2024-09-18 DIAGNOSIS — B37.0 THRUSH: Primary | ICD-10-CM

## 2024-09-18 RX ORDER — BUPROPION HYDROCHLORIDE 150 MG/1
300 TABLET ORAL DAILY
Qty: 180 TABLET | Refills: 3 | Status: SHIPPED | OUTPATIENT
Start: 2024-09-18 | End: 2025-09-18

## 2024-09-18 RX ORDER — FLUCONAZOLE 200 MG/1
200 TABLET ORAL DAILY
Qty: 7 TABLET | Refills: 0 | Status: SHIPPED | OUTPATIENT
Start: 2024-09-18 | End: 2024-09-25

## 2024-09-18 RX ORDER — BUPROPION HYDROCHLORIDE 150 MG/1
300 TABLET ORAL DAILY
Qty: 180 TABLET | Refills: 3 | Status: CANCELLED | OUTPATIENT
Start: 2024-09-18 | End: 2025-09-18

## 2024-09-18 RX ORDER — NYSTATIN 100000 [USP'U]/ML
4 SUSPENSION ORAL 4 TIMES DAILY
Qty: 160 ML | Refills: 0 | Status: SHIPPED | OUTPATIENT
Start: 2024-09-18 | End: 2024-09-18

## 2024-09-18 NOTE — TELEPHONE ENCOUNTER
No care due was identified.  [facility] Embedded Care Due Messages. Reference number: [id number].   9/18/2024 2:48:47 PM CDT

## 2024-09-18 NOTE — TELEPHONE ENCOUNTER
No care due was identified.  [facility] Embedded Care Due Messages. Reference number: [id number].   9/18/2024 6:06:07 PM CDT

## 2024-09-18 NOTE — PROGRESS NOTES
Patient ID: Lacey Jenkins Mondor is a 39 y.o. female.    Chief Complaint: General Illness (Entered automatically based on patient selection in "[employer].)          274}  The patient initiated a request through "[employer] on 9/18/2024 for evaluation and management with a chief complaint of General Illness (Entered automatically based on patient selection in "[employer].)     I evaluated the questionnaire submission on 09/18/2024 .    Total Time (in minutes): 11     [facility] Peq Evisit General    9/18/2024  9:14 AM CDT - Filed by Patient   Do you agree to participate in an E-Visit? Yes   If you have any of the following symptoms, please present to your local emergency room or call 911:  I acknowledge   Choose the state of your primary residence [place]   Are you pregnant, could you be pregnant, or are you breast feeding? None of the above   What is the main issue you would like addressed today? Oral thrush   Please describe your symptoms White coating that cannot be scraped from tongue, bad taste lasting for greater than a week despite gum, flossing, brushing, uncomfortable mouth and throat   Where is your problem located? Mouth   How severe are your symptoms? Severe   Have you had these symptoms before? No   How long have you been having these symptoms? For one to four weeks   Please list any medications or treatments you have used for your condition and indicate if it was effective or not. Brushing, flossing, sugar free gum, intense oral care   What makes this feel better? Nothing   What makes this feel worse? Sugary food or drink   Are these symptoms related to a condition that you currently have? No   Please describe any probable cause for these symptoms No idea   Provide any additional information you feel is important.    Please attach any relevant images or files    Are you able to take your vital signs? No          Active Problem List with Overview Notes    Diagnosis Date Noted    Moderate episode of recurrent major  "depressive disorder 11/07/2023    Recent bereavement 11/07/2023    HTN complicating peripregnancy, antepartum, third trimester 09/13/2021    Breast feeding status of mother 08/19/2019    Fluttering sensation of heart 09/20/2016    Goiter 01/07/2016    Abnormal thyroid stimulating hormone (TSH) level 01/07/2016    Allergic rhinitis due to pollen 01/07/2016    Post-op pain 06/25/2012      Recent Labs Obtained:  Lab Results   Component Value Date    WBC 8.51 11/29/2023    HGB 13.7 11/29/2023    HCT 41.9 11/29/2023    MCV 86 11/29/2023     11/29/2023     11/29/2023    K 4.3 11/29/2023    GLU 94 11/29/2023    CREATININE 0.8 11/29/2023    EGFRNORACEVR >60.0 11/29/2023    HGBA1C 4.9 11/29/2023    TSH 2.131 11/29/2023      Review of patient's allergies indicates:   Allergen Reactions    Latex, natural rubber Other (See Comments)     pain    Morphine Other (See Comments)     "burns like hell when they put it in"    Oxycodone-acetaminophen Blisters    Percocet [oxycodone-acetaminophen] Itching       Encounter Diagnosis   Name Primary?    Thrush Yes        Orders Placed This Encounter   Procedures    Hemoglobin A1C     Standing Status:   Future     Standing Expiration Date:   11/17/2025     Order Specific Question:   Send normal result to authorizing provider's In Basket if patient is active on MyChart:     Answer:   Yes      Medications Ordered This Encounter   Medications    fluconazole (DIFLUCAN) 200 MG Tab     Sig: Take 1 tablet (200 mg total) by mouth once daily. for 7 days     Dispense:  7 tablet     Refill:  0     Pt prefers diflucan instead of solution        E-Visit Time Tracking:    Day 1 Time (in minutes): 11    Total Time (in minutes): 11      274}          "

## 2024-11-16 DIAGNOSIS — I10 ESSENTIAL HYPERTENSION: ICD-10-CM

## 2024-11-16 RX ORDER — AMLODIPINE BESYLATE 2.5 MG/1
2.5 TABLET ORAL DAILY
Qty: 90 TABLET | Refills: 1 | Status: SHIPPED | OUTPATIENT
Start: 2024-11-16 | End: 2025-05-15

## 2024-11-16 NOTE — TELEPHONE ENCOUNTER
No care due was identified.  [facility] Embedded Care Due Messages. Reference number: [id number].   11/16/2024 5:08:13 AM CST

## 2024-11-16 NOTE — TELEPHONE ENCOUNTER
Refill Authorization Note     Refill Decision Note   Lacey Mondor  is requesting a refill authorization.  Brief Assessment and Rationale for Refill:  Approve     Medication Therapy Plan:       Medication Reconciliation Completed: No   Comments:     No Care Gaps recommended.     Note composed:10:51 AM 11/16/2024

## 2024-11-22 ENCOUNTER — LAB VISIT (OUTPATIENT)
Dept: LAB | Facility: HOSPITAL | Age: 39
End: 2024-11-22
Attending: FAMILY MEDICINE
Payer: COMMERCIAL

## 2024-11-22 DIAGNOSIS — E55.9 VITAMIN D DEFICIENCY: ICD-10-CM

## 2024-11-22 DIAGNOSIS — E61.1 IRON DEFICIENCY: ICD-10-CM

## 2024-11-22 DIAGNOSIS — Z00.01 ANNUAL VISIT FOR GENERAL ADULT MEDICAL EXAMINATION WITH ABNORMAL FINDINGS: ICD-10-CM

## 2024-11-22 LAB
ALBUMIN SERPL BCP-MCNC: 4.1 G/DL (ref 3.5–5.2)
ALP SERPL-CCNC: 83 U/L (ref 40–150)
ALT SERPL W/O P-5'-P-CCNC: 18 U/L (ref 10–44)
ANION GAP SERPL CALC-SCNC: 8 MMOL/L (ref 8–16)
AST SERPL-CCNC: 19 U/L (ref 10–40)
BASOPHILS # BLD AUTO: 0.07 K/UL (ref 0–0.2)
BASOPHILS NFR BLD: 1 % (ref 0–1.9)
BILIRUB SERPL-MCNC: 0.4 MG/DL (ref 0.1–1)
BUN SERPL-MCNC: 9 MG/DL (ref 6–20)
CALCIUM SERPL-MCNC: 9.3 MG/DL (ref 8.7–10.5)
CHLORIDE SERPL-SCNC: 110 MMOL/L (ref 95–110)
CHOLEST SERPL-MCNC: 219 MG/DL (ref 120–199)
CHOLEST/HDLC SERPL: 3.5 {RATIO} (ref 2–5)
CO2 SERPL-SCNC: 22 MMOL/L (ref 23–29)
CREAT SERPL-MCNC: 0.8 MG/DL (ref 0.5–1.4)
DIFFERENTIAL METHOD BLD: ABNORMAL
EOSINOPHIL # BLD AUTO: 0.1 K/UL (ref 0–0.5)
EOSINOPHIL NFR BLD: 1.6 % (ref 0–8)
ERYTHROCYTE [DISTWIDTH] IN BLOOD BY AUTOMATED COUNT: 13.8 % (ref 11.5–14.5)
EST. GFR  (NO RACE VARIABLE): >60 ML/MIN/1.73 M^2
FERRITIN SERPL-MCNC: 46 NG/ML (ref 20–300)
GLUCOSE SERPL-MCNC: 98 MG/DL (ref 70–110)
HCT VFR BLD AUTO: 41 % (ref 37–48.5)
HDLC SERPL-MCNC: 62 MG/DL (ref 40–75)
HDLC SERPL: 28.3 % (ref 20–50)
HGB BLD-MCNC: 13 G/DL (ref 12–16)
IMM GRANULOCYTES # BLD AUTO: 0.02 K/UL (ref 0–0.04)
IMM GRANULOCYTES NFR BLD AUTO: 0.3 % (ref 0–0.5)
IRON SERPL-MCNC: 81 UG/DL (ref 30–160)
LDLC SERPL CALC-MCNC: 140.8 MG/DL (ref 63–159)
LYMPHOCYTES # BLD AUTO: 2.2 K/UL (ref 1–4.8)
LYMPHOCYTES NFR BLD: 32.1 % (ref 18–48)
MCH RBC QN AUTO: 28.4 PG (ref 27–31)
MCHC RBC AUTO-ENTMCNC: 31.7 G/DL (ref 32–36)
MCV RBC AUTO: 90 FL (ref 82–98)
MONOCYTES # BLD AUTO: 0.4 K/UL (ref 0.3–1)
MONOCYTES NFR BLD: 6.4 % (ref 4–15)
NEUTROPHILS # BLD AUTO: 4 K/UL (ref 1.8–7.7)
NEUTROPHILS NFR BLD: 58.6 % (ref 38–73)
NONHDLC SERPL-MCNC: 157 MG/DL
NRBC BLD-RTO: 0 /100 WBC
PLATELET # BLD AUTO: 395 K/UL (ref 150–450)
PMV BLD AUTO: 9.6 FL (ref 9.2–12.9)
POTASSIUM SERPL-SCNC: 4.3 MMOL/L (ref 3.5–5.1)
PROT SERPL-MCNC: 7.3 G/DL (ref 6–8.4)
RBC # BLD AUTO: 4.57 M/UL (ref 4–5.4)
SATURATED IRON: 22 % (ref 20–50)
SODIUM SERPL-SCNC: 140 MMOL/L (ref 136–145)
TOTAL IRON BINDING CAPACITY: 374 UG/DL (ref 250–450)
TRANSFERRIN SERPL-MCNC: 253 MG/DL (ref 200–375)
TRIGL SERPL-MCNC: 81 MG/DL (ref 30–150)
TSH SERPL DL<=0.005 MIU/L-ACNC: 3.17 UIU/ML (ref 0.4–4)
WBC # BLD AUTO: 6.86 K/UL (ref 3.9–12.7)

## 2024-11-22 PROCEDURE — 82728 ASSAY OF FERRITIN: CPT | Performed by: FAMILY MEDICINE

## 2024-11-22 PROCEDURE — 82652 VIT D 1 25-DIHYDROXY: CPT | Performed by: FAMILY MEDICINE

## 2024-11-22 PROCEDURE — 84466 ASSAY OF TRANSFERRIN: CPT | Performed by: FAMILY MEDICINE

## 2024-11-22 PROCEDURE — 80053 COMPREHEN METABOLIC PANEL: CPT | Performed by: FAMILY MEDICINE

## 2024-11-22 PROCEDURE — 85025 COMPLETE CBC W/AUTO DIFF WBC: CPT | Performed by: FAMILY MEDICINE

## 2024-11-22 PROCEDURE — 80061 LIPID PANEL: CPT | Performed by: FAMILY MEDICINE

## 2024-11-22 PROCEDURE — 84443 ASSAY THYROID STIM HORMONE: CPT | Performed by: FAMILY MEDICINE

## 2024-11-25 LAB — 1,25(OH)2D3 SERPL-MCNC: 78 PG/ML (ref 20–79)

## 2024-12-02 ENCOUNTER — OFFICE VISIT (OUTPATIENT)
Dept: FAMILY MEDICINE | Facility: CLINIC | Age: 39
End: 2024-12-02
Payer: COMMERCIAL

## 2024-12-02 ENCOUNTER — PATIENT OUTREACH (OUTPATIENT)
Dept: ADMINISTRATIVE | Facility: HOSPITAL | Age: 39
End: 2024-12-02
Payer: COMMERCIAL

## 2024-12-02 ENCOUNTER — PATIENT MESSAGE (OUTPATIENT)
Dept: FAMILY MEDICINE | Facility: CLINIC | Age: 39
End: 2024-12-02

## 2024-12-02 ENCOUNTER — LAB VISIT (OUTPATIENT)
Dept: LAB | Facility: HOSPITAL | Age: 39
End: 2024-12-02
Attending: STUDENT IN AN ORGANIZED HEALTH CARE EDUCATION/TRAINING PROGRAM
Payer: COMMERCIAL

## 2024-12-02 VITALS
WEIGHT: 180.75 LBS | OXYGEN SATURATION: 99 % | HEART RATE: 82 BPM | HEIGHT: 63 IN | DIASTOLIC BLOOD PRESSURE: 80 MMHG | SYSTOLIC BLOOD PRESSURE: 124 MMHG | BODY MASS INDEX: 32.03 KG/M2

## 2024-12-02 DIAGNOSIS — I10 ESSENTIAL HYPERTENSION: ICD-10-CM

## 2024-12-02 DIAGNOSIS — E66.09 CLASS 1 OBESITY DUE TO EXCESS CALORIES WITH SERIOUS COMORBIDITY AND BODY MASS INDEX (BMI) OF 32.0 TO 32.9 IN ADULT: ICD-10-CM

## 2024-12-02 DIAGNOSIS — F41.9 ANXIETY: ICD-10-CM

## 2024-12-02 DIAGNOSIS — E78.49 OTHER HYPERLIPIDEMIA: ICD-10-CM

## 2024-12-02 DIAGNOSIS — E66.811 CLASS 1 OBESITY DUE TO EXCESS CALORIES WITH SERIOUS COMORBIDITY AND BODY MASS INDEX (BMI) OF 32.0 TO 32.9 IN ADULT: ICD-10-CM

## 2024-12-02 DIAGNOSIS — Z12.4 SCREENING FOR CERVICAL CANCER: ICD-10-CM

## 2024-12-02 DIAGNOSIS — B37.0 THRUSH: ICD-10-CM

## 2024-12-02 DIAGNOSIS — Z00.01 ANNUAL VISIT FOR GENERAL ADULT MEDICAL EXAMINATION WITH ABNORMAL FINDINGS: Primary | ICD-10-CM

## 2024-12-02 DIAGNOSIS — Z12.31 ENCOUNTER FOR SCREENING MAMMOGRAM FOR MALIGNANT NEOPLASM OF BREAST: ICD-10-CM

## 2024-12-02 LAB
ESTIMATED AVG GLUCOSE: 94 MG/DL (ref 68–131)
HBA1C MFR BLD: 4.9 % (ref 4–5.6)

## 2024-12-02 PROCEDURE — 1159F MED LIST DOCD IN RCRD: CPT | Mod: CPTII,S$GLB,, | Performed by: FAMILY MEDICINE

## 2024-12-02 PROCEDURE — 99999 PR PBB SHADOW E&M-EST. PATIENT-LVL IV: CPT | Mod: PBBFAC,,, | Performed by: FAMILY MEDICINE

## 2024-12-02 PROCEDURE — 36415 COLL VENOUS BLD VENIPUNCTURE: CPT | Mod: PO | Performed by: STUDENT IN AN ORGANIZED HEALTH CARE EDUCATION/TRAINING PROGRAM

## 2024-12-02 PROCEDURE — 3008F BODY MASS INDEX DOCD: CPT | Mod: CPTII,S$GLB,, | Performed by: FAMILY MEDICINE

## 2024-12-02 PROCEDURE — 3074F SYST BP LT 130 MM HG: CPT | Mod: CPTII,S$GLB,, | Performed by: FAMILY MEDICINE

## 2024-12-02 PROCEDURE — 99396 PREV VISIT EST AGE 40-64: CPT | Mod: S$GLB,,, | Performed by: FAMILY MEDICINE

## 2024-12-02 PROCEDURE — 83036 HEMOGLOBIN GLYCOSYLATED A1C: CPT | Performed by: STUDENT IN AN ORGANIZED HEALTH CARE EDUCATION/TRAINING PROGRAM

## 2024-12-02 PROCEDURE — 3079F DIAST BP 80-89 MM HG: CPT | Mod: CPTII,S$GLB,, | Performed by: FAMILY MEDICINE

## 2024-12-02 RX ORDER — AMLODIPINE BESYLATE 5 MG/1
5 TABLET ORAL DAILY
COMMUNITY

## 2024-12-02 NOTE — PROGRESS NOTES
Assessment:       1. Annual visit for general adult medical examination with abnormal findings    2. Encounter for screening mammogram for malignant neoplasm of breast    3. Screening for cervical cancer    4. Other hyperlipidemia    5. Essential hypertension    6. Anxiety    7. Class 1 obesity due to excess calories with serious comorbidity and body mass index (BMI) of 32.0 to 32.9 in adult        Assessment & Plan    Annual visit for general adult medical examination with abnormal findings    Encounter for screening mammogram for malignant neoplasm of breast  -     Mammo Digital Screening Bilat w/ Nikolay; Future; Expected date: 12/02/2024    Screening for cervical cancer: The patient will make an appointment to see the gynecologist soon.    Other hyperlipidemia:  Uncontrolled    Essential hypertension:  Stable    Anxiety: Stable    Class 1 obesity due to excess calories with serious comorbidity and body mass index (BMI) of 32.0 to 32.9 in adult: Improved       Reviewed patient's recent labs: cholesterol slightly elevated but stable, other values within normal limits  Assessed cardiovascular risk: calcium scoring from last year was 0, indicating no need for cholesterol medication at this time  Evaluated blood pressure management: current readings normal on amlodipine, but patient reports fatigue as side effect  Considered mammogram and Pap smear due for preventive care  Noted patient is due for COVID vaccine  Observed 4-pound weight loss since March    HYPERLIPIDEMIA:  - Monitored cholesterol levels, noting a slight decrease from 220 to 219.  - Reviewed calcium scoring test performed last year, which resulted in a score of zero.  - Assessed that cholesterol medication is not needed based on the calcium scoring result.    HYPERTENSION:  - Increased amlodipine dosage from 2.5 mg to 5 mg daily.  - Monitored blood pressure, with current reading at 124/80.  - Evaluated patient's report of fatigue and lack of energy when  taking amlodipine 2.5mg, and headaches when not taking medication.  - Acknowledged that the current dosage might not be optimal.  - Prescribed amlodipine 5mg, with suggestion to take 2.5mg twice daily.  - Instructed patient to contact office if needing amlodipine prescription filled before next visit.    URINARY HEALTH:  - Continued mannose for urinary health, likely for long-term use.  - Recommend continued use of OTC cranberry and hibiscus supplement for urinary health.    HEADACHES:  - Continued Ubrelvy as needed for headaches.  - Noted patient's use of OTC medication (likely ibuprofen) for headaches as needed.    WEIGHT MANAGEMENT:  - Monitored current weight at 180 lbs, noting a 4-pound weight loss since March.  - Explained digital medicine program for weight loss available to [employer] employees, including potential benefits and waiting list details.  - Recommend patient to consider enrolling in digital medicine program for weight loss through employee website.    PSYCHOSOCIAL ASSESSMENT:  - Noted patient's recent separation from  in May.  - Evaluated patient's current living situation and custody arrangement.  - Acknowledged patient's stress due to separation.  - Noted patient's current unstable living situation, staying with mother and ex-.  - Noted patient's new job at [employer].    INFLUENZA VACCINATION:  - Discussed increased flu prevalence this season.    MAMMOGRAM:  - Patient to schedule mammogram.  - Mammogram ordered.    OB/GYN REFERRAL:  - Patient to make appointment with Ina Phelps for Pap smear.    COVID-19 VACCINATION:  - Patient to get COVID vaccine at pharmacy.    MEDICATIONS/SUPPLEMENTS:  - Continued DMC in a long cream as needed.    FOLLOW UP:  - Follow up in 1 year for annual visit.                The patient's BMI has been recorded in the chart. The patient has been provided educational materials regarding the benefits of attaining and maintaining a normal weight. We will continue to  address and follow this issue during follow up visits.   Patient agreed with assessment and plan. Patient verbalized understanding.     Subjective:       Patient ID: Lacey Jenkins Mondor is a 39 y.o. female.    Chief Complaint: Annual Exam      History of Present Illness    CHIEF COMPLAINT:  Patient presents for a follow-up visit to discuss recent lab results and medication management.    HPI:  Patient reports headaches when not taking her blood pressure medication, amlodipine. She had been taking 2.5 mg of amlodipine but recently increased the dose to 5 mg due to headache onset. When her blood pressure is well-controlled, she feels fatigue and lack of energy. Patient uses D-Mannose for urinary symptoms, as recommended by her urologist, combined with cranberry and hibiscus supplements for infection prevention. She occasionally uses OTC medication for headaches as needed. Patient discloses recent significant life changes, including separation from her  in May, causing considerable stress. She resides with her mother and has custody of her children from Wednesday to Saturday. Patient reports difficulty maintaining her exercise routine since starting a new job at [employer], which involves more sedentary work compared to her previous position.    MEDICATIONS:  Patient is on Amlodipine 5 mg daily for blood pressure control. She takes D-Mannose daily, along with a cranberry and hibiscus supplement, for urinary tract health. Naproxen is used as needed for headaches. She also uses triamcinolone cream as needed.    MEDICAL HISTORY:  The patient had two first trimester miscarriages, first and third respectively until her OBGYN started her on prometrium during first trimester.     FAMILY HISTORY:  Family history is significant for grandfather had a stroke.    TEST RESULTS:  Patient's cholesterol level was 220 last year and is currently 219. Recent lab results show her CO2 levels and liver enzymes are fine in her  Comprehensive Metabolic Panel. Her blood sugar and kidney function are also reported as fine and great, respectively. Both her Complete Blood Count and iron levels are good.    IMAGING:  Last year, the patient underwent calcium scoring, which resulted in a score of zero. She is due for a mammogram.    SOCIAL HISTORY:  Patient works for [employer] on the [employer] team. She has been  since May.      ROS:  ROS as indicated in HPI.          Past medical history, past social history was reviewed and discussed with the patient.        Objective:      Physical Exam       Vitals: Weight: 180 lbs. Blood pressure: 124/80.  General: No acute distress. Well-developed. Well-nourished.  Eyes: EOMI. Sclerae anicteric.  HENT: Normocephalic. Atraumatic. Nares patent. Moist oral mucosa.  Cardiovascular: Regular rate. Regular rhythm.   Respiratory: Normal respiratory effort. Clear to auscultation bilaterally. No rales. No rhonchi. No wheezing.  Musculoskeletal: No  obvious deformity.  Extremities: No lower extremity edema.  Neurological: Alert & oriented x3. No slurred speech. Normal gait.  Psychiatric: Normal mood. Normal affect. Good insight. Good judgment.  Skin: Warm. Dry. No rash.                   This note was generated with the assistance of ambient listening technology. Verbal consent was obtained by the patient and accompanying visitor(s) for the recording of patient appointment to facilitate this note. I attest to having reviewed and edited the generated note for accuracy, though some syntax or spelling errors may persist. Please contact the author of this note for any clarification.

## 2024-12-02 NOTE — PROGRESS NOTES
Population Health Chart Review & Patient Outreach Details      Additional Pop Health Notes:               Updates Requested / Reviewed:      Updated Care Coordination Note         Health Maintenance Topics Overdue:      VBHM Score: 2     Cervical Cancer Screening  Mammogram                       Health Maintenance Topic(s) Outreach Outcomes & Actions Taken:    Cervical Cancer Screening - Outreach Outcomes & Actions Taken  : External Records Requested & Care Team Updated if Applicable

## 2024-12-02 NOTE — Clinical Note
The patient stated that she had a Pap smear a proximally 2 years ago, with Dr. Ina Phelps.  Thank you.

## 2024-12-02 NOTE — LETTER
FAX      AUTHORIZATION FOR RELEASE OF   CONFIDENTIAL INFORMATION      Dr. Phelps      We are seeing Lacey Jenkins Mondor, date of birth 1985, in the clinic at [facility]. Sandra Andersen MD is the patient's PCP. Lacey Jenkins Mondor has an outstanding lab/procedure at the time we reviewed their chart. In order to help keep their health information updated, Lacey Jenkins Mondor has authorized us to request the following medical records:        PAP SMEAR - (MOST RECENT WITHIN 3 YRS) - Included HPV Screening Results if completed with Pap smear          Please fax records to Sandra Andersen MD at [facility] at [phone] or email to [email or web address].          If you have any questions, please contact [facility] at [phone]    Thank you,    Ariadna Durand LPN LPN Clinical Care Coordinator  [facility] [place] [facility]                     Lacey Jenkins Mondor  MRN: [id number]  : 1985  Age: 38 y.o.  Sex: female         Patient/Legal Guardian Signature  This signature was collected at 2024           _______________________________   Printed Name/Relationship to Patient      Consent for Examination and Treatment: I hereby authorize the providers and employees of [facility] ([facility]) to provide medical treatment/services which includes, but is not limited to, performing and administering tests and diagnostic procedures that are deemed necessary, including, but not limited to, imaging examinations, blood tests and other laboratory procedures as may be required by the hospital, clinic, or may be ordered by my physician(s) or persons working under the general and/or special instructions of my physician(s).      I understand and agree that this consent covers all authorized persons, including but not limited to physicians, residents, nurse practitioners, physicians' assistants, specialists, consultants, student nurses, and  independently contracted physicians, who are called upon by the physician in charge, to carry out the diagnostic procedures and medical or surgical treatment.     I hereby authorize [facility] to retain or dispose of any specimens or tissue, should there be such remaining from any test or procedure.     I hereby authorize and give consent for [facility] providers and employees to take photographs, images or videotapes of such diagnostic, surgical or treatment procedures of Patient as may be required by [facility] or as may be ordered by a physician. I further acknowledge and agree that [facility] may use cameras or other devices for patient monitoring.     I am aware that the practice of medicine is not an exact science, and I acknowledge that no guarantees have been made to me as to the outcome of any tests, procedures or treatment.     Authorization for Release of Information: I understand that my insurance company and/or their agents may need information necessary to make determinations about payment/reimbursement. I hereby provide authorization to release to all insurance companies, their successors, assignees, other parties with whom they may have contracted, or others acting on their behalf, that are involved with payment for any hospital and/or clinic charges incurred by the patient, any information that they request and deem necessary for payment/reimbursement, and/or quality review.  I further authorize the release of my health information to physicians or other health care practitioners on staff who are involved in my health care now and in the future, and to other health care providers, entities, or institutions for the purpose of my continued care and treatment, including referrals.     REGISTRATION AUTHORIZATION  Form No. 83001 (Rev. 7/13/2022)       Medicare Patient's Certification and Authorization to Release Information and Payment Request:  I certify that the information given by me in applying for payment  under Title XVIII of the Social Security Act is correct. I authorize any cooper of medical or other information about me to release to the Social SecurityAdministration, or its intermediaries or carriers, any information needed for this or a related Medicare claim. I request that payment of authorized benefits be made on my behalf.     Assignment of Insurance Benefits:   I hereby authorize any and all insurance companies, health plans, defined   benefit plans, health insurers or any entity that is or may be responsible for payment of my medical expenses to pay all hospital and medical benefits now due, and to become due and payable to me under any hospital benefits, sick benefits, injury benefits or any other benefit for services rendered to me, including Major Medical Benefits, direct to [facility] and all independently contracted physicians. I assign any and all rights that I may have against any and all insurance companies, health plans, defined benefit plans, health insurers or any entity that is or may be responsible for payment of my medical expenses, including, but not limited to any right to appeal a denial of a claim, any right to bring any action, lawsuit, administrative proceeding, or other cause of action on my behalf. I specifically assign my right to pursue litigation against any and all insurance companies, health plans, defined benefit plans, health insurers or any entity that is or may be responsible for payment of my medical expenses based upon a refusal to pay charges.            E. Valuables: It is understood and agreed that [facility] is not liable for the damage to or loss of any money, jewelry,   documents, dentures, eye glasses, hearing aids, prosthetics, or other property of value.     F. Computer Equipment: I understand and agree that should I choose to use computer equipment owned by [facility] or if I choose to access the Internet via [facility]s network, I do so at my own risk. [facility] is not  responsible for any damage to my computer equipment or to any damages of any type that might arise from my loss of equipment or data.     G. Acceptance of Financial Responsibility:  I agree that in consideration of the services and   supplies that have been   or will be furnished to the patient, I am hereby obligated to pay all charges made for or on the account of the patient according to the standard rates (in effect at the time the services and supplies are delivered) established by [facility], including its Patient Financial Assistance Policy to the extent it is applicable. I understand that I am responsible for all charges, or portions thereof, not covered by insurance or other sources. Patient refunds will be distributed only after balances at all [facility] facilities are paid.     H. Communication Authorization:  I hereby authorize [facility] and its representatives, along with any billing service   or  who may work on their behalf, to contact me on   my cell phone and/or home phone using pre- recorded messages, artificial voice messages, automatic telephone dialing devices or other computer assisted technology, or by electronic      mail, text messaging, or by any other form of electronic communication. This includes, but is not limited to, appointment reminders, yearly physical exam reminders, preventive care reminders, patient campaigns, welcome calls, and calls about account balances on my account or any account on which I am listed as a guarantor. I understand I have the right to opt out of these communications at any time.      Relationship  Between  Facility and  Provider:      I understand that some, but not all, providers furnishing services to the patient are not employees or agents of [facility]. The patient is under the care and supervision of his/her attending physician, and it is the responsibility of the facility and its nursing staff to carry out the instructions of such physicians. It is  the responsibility of the patient's physician/designee to obtain the patient's informed consent, when required, for medical or surgical treatment, special diagnostic or therapeutic procedures, or hospital services rendered for the patient under the special instructions of the physician/designee.     REGISTRATION AUTHORIZATION  Form No. 98362 (Rev. 7/13/2022)      Notice of Privacy Practices: I acknowledge I have received a copy of [facility]'s Notice of Privacy Practices.     Facility  Directory: I have discussed with the organization my desire to be either included or excluded  in the facility directory in the event of my being an inpatient at an [facility] facility. I understand that if my choice is to opt-out of being identified in the facility directory that the facility will not provide any information about me such as my condition (e.g. fair, stable, etc.) or my location in the facility (e.g., room number, department).     Immunizations: [facility] shares immunization information with state sponsored health departments to help you and your doctor keep track of your immunization records. By signing, you consent to have this information shared with the health department in your state:                                [place] - LINKS ([place] Immunization Network for Kids Statewide)                                [place] - MIIX ([place] Immunization Information eXchange)                                [place] - ImmPRINT (Immunization Patient Registry with Integrated Technology)     TERM: This authorization is valid for this and subsequent care/treatment I receive at [facility] and will remain valid unless/until revoked in writing by me.     [facility]: As used in this document, [facility] means all [facility] owned and managed facilities, including, but not limited to, all health centers, surgery centers, clinics, urgent care centers, and hospitals.         [facility] complies with applicable  Federal civil rights laws and does not discriminate on the basis of race, color, national origin, age, disability, or sex.  ATENCIÓN: si habla ike, tiene a rodriguez disposición servicios gratuitos de asistencia lingüística. Treasure al [phone].  CHÚ Ý: N?u b?n nói Ti?ng Vi?t, có các d?ch v? h? tr? ngôn ng? mi?n phí dành cho b?n. G?i s? [phone].        REGISTRATION AUTHORIZATION  Form No. 26248 (Rev. 2022)       Patient Name: Eleni Gonsalezreyes  : 1985  Patient Phone #: [phone]

## 2024-12-17 ENCOUNTER — PATIENT MESSAGE (OUTPATIENT)
Dept: ADMINISTRATIVE | Facility: HOSPITAL | Age: 39
End: 2024-12-17
Payer: COMMERCIAL

## 2024-12-17 ENCOUNTER — PATIENT OUTREACH (OUTPATIENT)
Dept: ADMINISTRATIVE | Facility: HOSPITAL | Age: 39
End: 2024-12-17
Payer: COMMERCIAL

## 2025-01-23 PROBLEM — K80.00 CHOLELITHIASIS WITH ACUTE CHOLECYSTITIS: Status: ACTIVE | Noted: 2025-01-23

## 2025-01-23 PROBLEM — K81.0 ACUTE CHOLECYSTITIS: Status: ACTIVE | Noted: 2025-01-23

## 2025-03-27 ENCOUNTER — PATIENT MESSAGE (OUTPATIENT)
Dept: FAMILY MEDICINE | Facility: CLINIC | Age: 40
End: 2025-03-27
Payer: COMMERCIAL

## 2025-04-05 ENCOUNTER — PATIENT MESSAGE (OUTPATIENT)
Dept: FAMILY MEDICINE | Facility: CLINIC | Age: 40
End: 2025-04-05
Payer: COMMERCIAL

## 2025-04-07 ENCOUNTER — OFFICE VISIT (OUTPATIENT)
Dept: FAMILY MEDICINE | Facility: CLINIC | Age: 40
End: 2025-04-07
Payer: COMMERCIAL

## 2025-04-07 DIAGNOSIS — I10 ESSENTIAL HYPERTENSION: ICD-10-CM

## 2025-04-07 DIAGNOSIS — G43.829 MENSTRUAL MIGRAINE WITHOUT STATUS MIGRAINOSUS, NOT INTRACTABLE: Primary | ICD-10-CM

## 2025-04-07 RX ORDER — AMLODIPINE BESYLATE 5 MG/1
5 TABLET ORAL DAILY
Qty: 90 TABLET | Status: CANCELLED | OUTPATIENT
Start: 2025-04-07

## 2025-04-07 RX ORDER — AMLODIPINE BESYLATE 2.5 MG/1
2.5 TABLET ORAL 2 TIMES DAILY
Qty: 60 TABLET | Refills: 2 | Status: SHIPPED | OUTPATIENT
Start: 2025-04-07 | End: 2026-04-07

## 2025-04-07 RX ORDER — UBROGEPANT 100 MG/1
100 TABLET ORAL
Qty: 30 TABLET | Refills: 0 | Status: SHIPPED | OUTPATIENT
Start: 2025-04-07

## 2025-04-07 NOTE — PROGRESS NOTES
The patient location is: [place]  The chief complaint leading to consultation is: medication refill    Visit type: audiovisual    Notes:    HPI  Louie 40yo F  Presents for televideo visit to discuss    #med refill - HYPERTENSION and migraine, menstrual  Needs refill of blood pressure medication. She was switched from amlodipine 2.5mg twice a day to amlodipine 5mg while in hospital. She felt dizzy and tired with orthostatic hypotension with 5mg once daily and felt better with 2.5mg twice daily.   For migraines, primarily menstrual. Requests ubrelvy refill. Tolerated medications well in the past, without any adverse side effects.       No other complaints or concerns. I answered all of patient's questions.     Review of Systems   Constitutional:  Negative for activity change and unexpected weight change.   HENT:  Negative for hearing loss, rhinorrhea and trouble swallowing.    Eyes:  Negative for discharge and visual disturbance.   Respiratory:  Negative for chest tightness and wheezing.    Cardiovascular:  Negative for chest pain and palpitations.   Gastrointestinal:  Negative for blood in stool, constipation, diarrhea and vomiting.   Endocrine: Negative for polydipsia and polyuria.   Genitourinary:  Negative for difficulty urinating, dysuria, hematuria and menstrual problem.   Musculoskeletal:  Negative for arthralgias, joint swelling and neck pain.   Neurological:  Negative for weakness and headaches.   Psychiatric/Behavioral:  Negative for confusion and dysphoric mood.        Objective:  Physical Exam  Constitutional:       Appearance: Normal appearance.   Pulmonary:      Effort: Pulmonary effort is normal. No respiratory distress.   Neurological:      Mental Status: Alert and oriented to person, place, and time.   Psychiatric:         Attention and Perception: Attention normal.         Mood and Affect: Mood normal. Mood is not anxious.         Behavior: Behavior normal.     Plan:  1. Menstrual migraine without  status migrainosus, not intractable  -     ubrogepant (UBRELVY) 100 mg tablet; Take 1 tablet (100 mg total) by mouth as needed for Migraine.  Dispense: 30 tablet; Refill: 0    2. Essential hypertension  -     amLODIPine (NORVASC) 2.5 MG tablet; Take 1 tablet (2.5 mg total) by mouth 2 (two) times daily.  Dispense: 60 tablet; Refill: 2        Follow up if symptoms worsen or fail to improve.      Face to Face time with patient: 7 minutes  12 minutes of total time spent on the encounter, which includes face to face time and non-face to face time preparing to see the patient (eg, review of tests), Obtaining and/or reviewing separately obtained history, Documenting clinical information in the electronic or other health record, Independently interpreting results (not separately reported) and communicating results to the patient/family/caregiver, or Care coordination (not separately reported).     Each patient to whom he or she provides medical services by telemedicine is:  (1) informed of the relationship between the physician and patient and the respective role of any other health care provider with respect to management of the patient; and (2) notified that he or she may decline to receive medical services by telemedicine and may withdraw from such care at any time.

## 2025-05-27 ENCOUNTER — E-VISIT (OUTPATIENT)
Dept: URGENT CARE | Facility: CLINIC | Age: 40
End: 2025-05-27
Payer: COMMERCIAL

## 2025-05-27 DIAGNOSIS — H10.9 CONJUNCTIVITIS, UNSPECIFIED CONJUNCTIVITIS TYPE, UNSPECIFIED LATERALITY: Primary | ICD-10-CM

## 2025-05-27 RX ORDER — CIPROFLOXACIN HYDROCHLORIDE 3 MG/ML
SOLUTION/ DROPS OPHTHALMIC
Qty: 10 ML | Refills: 0 | Status: SHIPPED | OUTPATIENT
Start: 2025-05-27

## 2025-05-27 NOTE — PROGRESS NOTES
Patient ID: Lacey Jenkins Mondor is a 40 y.o. female.        E-Visit Time Tracking:   Day 1 Time (in minutes): 6  Total Time (in minutes): 6      Chief Complaint: General Illness (Entered automatically based on patient selection in [employer].)      The patient initiated a request through [employer] on 5/27/2025 for evaluation and management with a chief complaint of General Illness (Entered automatically based on patient selection in [employer].)     I evaluated the questionnaire submission on 05/27/2025.    [facility] Evisit [facility]-Common Problems    5/27/2025  9:15 AM CDT - Filed by Patient   What do you need help with? Red Eye   Do you agree to participate in an E-Visit? Yes   If you have any of the following symptoms, please go to the nearest Emergency Room or call 911: I acknowledge   Do you have any of the following pregnancy-related conditions? None   At least one photo is required for treatment. You may upload up to three photos of the affected area.    What is the main issue you would like addressed today? Itchy, gunky eyes, been waking up with eyes sealed with gunk for a week or so. Been using wetting eyedrops three times daily but no improvement.   Do you have any of the following eye symptoms? Eye itching or irritation;  Eye discharge or crusting   Do you have any of the following additional symptoms? None of the above   How long have you had your eye symptoms? About a week   Do you have a fever? No   Did your symptoms begin after swimming? No   Have your eyes been exposed to any chemicals, creams, or drops that may be causing irritation? No   Have you had any recent eye injuries? No   Have you been exposed to anyone with similar symptoms? Not sure   Do you wear contact lenses? No   Have you had any of the following conditions? Not sure   Have you had any of the following eye conditions or treatments? None   What medications are you currently using for your eye symptoms? Eye drops   Provide any additional  information you feel is important.    Are you able to take your vital signs? No         Encounter Diagnosis   Name Primary?    Conjunctivitis, unspecified conjunctivitis type, unspecified laterality Yes        No orders of the defined types were placed in this encounter.     Medications Ordered This Encounter   Medications    ciprofloxacin HCl (CILOXAN) 0.3 % ophthalmic solution     Sig: Use 1 to 2 drops to the affected eye every 2 hours while awake for 2 days,then 1 to 2 drops every 4 hours while awake for the next 5 days     Dispense:  10 mL     Refill:  0        No follow-ups on file.

## 2025-06-30 DIAGNOSIS — I10 ESSENTIAL HYPERTENSION: ICD-10-CM

## 2025-06-30 RX ORDER — AMLODIPINE BESYLATE 2.5 MG/1
2.5 TABLET ORAL 2 TIMES DAILY
Qty: 180 TABLET | Refills: 3 | Status: SHIPPED | OUTPATIENT
Start: 2025-06-30 | End: 2026-06-30